# Patient Record
Sex: FEMALE | Race: BLACK OR AFRICAN AMERICAN | Employment: FULL TIME | URBAN - METROPOLITAN AREA
[De-identification: names, ages, dates, MRNs, and addresses within clinical notes are randomized per-mention and may not be internally consistent; named-entity substitution may affect disease eponyms.]

---

## 2018-10-21 ENCOUNTER — APPOINTMENT (OUTPATIENT)
Dept: CT IMAGING | Age: 48
DRG: 394 | End: 2018-10-21
Payer: COMMERCIAL

## 2018-10-21 ENCOUNTER — HOSPITAL ENCOUNTER (INPATIENT)
Age: 48
LOS: 4 days | Discharge: HOME | DRG: 394 | End: 2018-10-25
Attending: EMERGENCY MEDICINE | Admitting: INTERNAL MEDICINE
Payer: COMMERCIAL

## 2018-10-21 DIAGNOSIS — R10.30 LOWER ABDOMINAL PAIN: ICD-10-CM

## 2018-10-21 DIAGNOSIS — D64.9 ACUTE ON CHRONIC ANEMIA: ICD-10-CM

## 2018-10-21 DIAGNOSIS — K52.9 COLITIS: Primary | ICD-10-CM

## 2018-10-21 DIAGNOSIS — K92.2 LOWER GI BLEED: ICD-10-CM

## 2018-10-21 DIAGNOSIS — K92.2 LOWER GI BLEEDING: ICD-10-CM

## 2018-10-21 LAB
ABO/RH: NORMAL
ALBUMIN SERPL-MCNC: 4.2 G/DL (ref 3.4–5)
ALP BLD-CCNC: 74 U/L (ref 40–129)
ALT SERPL-CCNC: 9 U/L (ref 10–40)
ANION GAP SERPL CALCULATED.3IONS-SCNC: 12 MMOL/L (ref 3–16)
ANION GAP SERPL CALCULATED.3IONS-SCNC: 13 MMOL/L (ref 3–16)
ANTIBODY SCREEN: NORMAL
AST SERPL-CCNC: 19 U/L (ref 15–37)
BASOPHILS ABSOLUTE: 0 K/UL (ref 0–0.2)
BASOPHILS RELATIVE PERCENT: 0.5 %
BILIRUB SERPL-MCNC: 0.3 MG/DL (ref 0–1)
BILIRUBIN DIRECT: <0.2 MG/DL (ref 0–0.3)
BILIRUBIN, INDIRECT: ABNORMAL MG/DL (ref 0–1)
BUN BLDV-MCNC: 5 MG/DL (ref 7–20)
BUN BLDV-MCNC: 5 MG/DL (ref 7–20)
CALCIUM SERPL-MCNC: 8.7 MG/DL (ref 8.3–10.6)
CALCIUM SERPL-MCNC: 9.3 MG/DL (ref 8.3–10.6)
CHLORIDE BLD-SCNC: 103 MMOL/L (ref 99–110)
CHLORIDE BLD-SCNC: 110 MMOL/L (ref 99–110)
CO2: 19 MMOL/L (ref 21–32)
CO2: 24 MMOL/L (ref 21–32)
CREAT SERPL-MCNC: 0.6 MG/DL (ref 0.6–1.1)
CREAT SERPL-MCNC: 0.6 MG/DL (ref 0.6–1.1)
EOSINOPHILS ABSOLUTE: 0 K/UL (ref 0–0.6)
EOSINOPHILS RELATIVE PERCENT: 0.4 %
GFR AFRICAN AMERICAN: >60
GFR AFRICAN AMERICAN: >60
GFR NON-AFRICAN AMERICAN: >60
GFR NON-AFRICAN AMERICAN: >60
GLUCOSE BLD-MCNC: 114 MG/DL (ref 70–99)
GLUCOSE BLD-MCNC: 88 MG/DL (ref 70–99)
HCG QUALITATIVE: NEGATIVE
HCT VFR BLD CALC: 22.7 % (ref 36–48)
HCT VFR BLD CALC: 25.4 % (ref 36–48)
HEMOGLOBIN: 6.7 G/DL (ref 12–16)
HEMOGLOBIN: 7.7 G/DL (ref 12–16)
IMMATURE RETIC FRACT: 0.49 (ref 0.21–0.37)
INR BLD: 1.08 (ref 0.86–1.14)
LACTIC ACID: 0.6 MMOL/L (ref 0.4–2)
LIPASE: 19 U/L (ref 13–60)
LYMPHOCYTES ABSOLUTE: 3.4 K/UL (ref 1–5.1)
LYMPHOCYTES RELATIVE PERCENT: 50.9 %
MCH RBC QN AUTO: 18.3 PG (ref 26–34)
MCH RBC QN AUTO: 18.7 PG (ref 26–34)
MCHC RBC AUTO-ENTMCNC: 29.5 G/DL (ref 31–36)
MCHC RBC AUTO-ENTMCNC: 30.3 G/DL (ref 31–36)
MCV RBC AUTO: 61.7 FL (ref 80–100)
MCV RBC AUTO: 62.1 FL (ref 80–100)
MONOCYTES ABSOLUTE: 0.4 K/UL (ref 0–1.3)
MONOCYTES RELATIVE PERCENT: 5.8 %
NEUTROPHILS ABSOLUTE: 2.8 K/UL (ref 1.7–7.7)
NEUTROPHILS RELATIVE PERCENT: 42.4 %
PDW BLD-RTO: 21.8 % (ref 12.4–15.4)
PDW BLD-RTO: 21.8 % (ref 12.4–15.4)
PLATELET # BLD: 265 K/UL (ref 135–450)
PLATELET # BLD: 308 K/UL (ref 135–450)
PMV BLD AUTO: 8.6 FL (ref 5–10.5)
PMV BLD AUTO: 8.6 FL (ref 5–10.5)
POTASSIUM REFLEX MAGNESIUM: 3.7 MMOL/L (ref 3.5–5.1)
POTASSIUM SERPL-SCNC: 4.1 MMOL/L (ref 3.5–5.1)
PROTHROMBIN TIME: 12.3 SEC (ref 9.8–13)
RBC # BLD: 3.66 M/UL (ref 4–5.2)
RBC # BLD: 4.13 M/UL (ref 4–5.2)
RETICULOCYTE ABSOLUTE COUNT: 0.05 M/UL (ref 0.02–0.1)
RETICULOCYTE COUNT PCT: 1.34 % (ref 0.5–2.18)
SEDIMENTATION RATE, ERYTHROCYTE: 17 MM/HR (ref 0–20)
SODIUM BLD-SCNC: 139 MMOL/L (ref 136–145)
SODIUM BLD-SCNC: 142 MMOL/L (ref 136–145)
TOTAL PROTEIN: 7.8 G/DL (ref 6.4–8.2)
WBC # BLD: 6.7 K/UL (ref 4–11)
WBC # BLD: 8.5 K/UL (ref 4–11)

## 2018-10-21 PROCEDURE — 6360000004 HC RX CONTRAST MEDICATION: Performed by: EMERGENCY MEDICINE

## 2018-10-21 PROCEDURE — 80048 BASIC METABOLIC PNL TOTAL CA: CPT

## 2018-10-21 PROCEDURE — 6360000002 HC RX W HCPCS: Performed by: STUDENT IN AN ORGANIZED HEALTH CARE EDUCATION/TRAINING PROGRAM

## 2018-10-21 PROCEDURE — 86900 BLOOD TYPING SEROLOGIC ABO: CPT

## 2018-10-21 PROCEDURE — 85045 AUTOMATED RETICULOCYTE COUNT: CPT

## 2018-10-21 PROCEDURE — 6360000002 HC RX W HCPCS: Performed by: EMERGENCY MEDICINE

## 2018-10-21 PROCEDURE — 85610 PROTHROMBIN TIME: CPT

## 2018-10-21 PROCEDURE — 86850 RBC ANTIBODY SCREEN: CPT

## 2018-10-21 PROCEDURE — 2580000003 HC RX 258: Performed by: STUDENT IN AN ORGANIZED HEALTH CARE EDUCATION/TRAINING PROGRAM

## 2018-10-21 PROCEDURE — 85652 RBC SED RATE AUTOMATED: CPT

## 2018-10-21 PROCEDURE — 96374 THER/PROPH/DIAG INJ IV PUSH: CPT

## 2018-10-21 PROCEDURE — 86901 BLOOD TYPING SEROLOGIC RH(D): CPT

## 2018-10-21 PROCEDURE — 80076 HEPATIC FUNCTION PANEL: CPT

## 2018-10-21 PROCEDURE — 85025 COMPLETE CBC W/AUTO DIFF WBC: CPT

## 2018-10-21 PROCEDURE — 74177 CT ABD & PELVIS W/CONTRAST: CPT

## 2018-10-21 PROCEDURE — 85027 COMPLETE CBC AUTOMATED: CPT

## 2018-10-21 PROCEDURE — 2500000003 HC RX 250 WO HCPCS: Performed by: EMERGENCY MEDICINE

## 2018-10-21 PROCEDURE — 1200000000 HC SEMI PRIVATE

## 2018-10-21 PROCEDURE — 83540 ASSAY OF IRON: CPT

## 2018-10-21 PROCEDURE — 86140 C-REACTIVE PROTEIN: CPT

## 2018-10-21 PROCEDURE — 84238 ASSAY NONENDOCRINE RECEPTOR: CPT

## 2018-10-21 PROCEDURE — 6360000002 HC RX W HCPCS

## 2018-10-21 PROCEDURE — 2580000003 HC RX 258: Performed by: EMERGENCY MEDICINE

## 2018-10-21 PROCEDURE — 96375 TX/PRO/DX INJ NEW DRUG ADDON: CPT

## 2018-10-21 PROCEDURE — G0378 HOSPITAL OBSERVATION PER HR: HCPCS

## 2018-10-21 PROCEDURE — 99291 CRITICAL CARE FIRST HOUR: CPT

## 2018-10-21 PROCEDURE — C9113 INJ PANTOPRAZOLE SODIUM, VIA: HCPCS | Performed by: EMERGENCY MEDICINE

## 2018-10-21 PROCEDURE — 36415 COLL VENOUS BLD VENIPUNCTURE: CPT

## 2018-10-21 PROCEDURE — 84703 CHORIONIC GONADOTROPIN ASSAY: CPT

## 2018-10-21 PROCEDURE — 83605 ASSAY OF LACTIC ACID: CPT

## 2018-10-21 PROCEDURE — 83690 ASSAY OF LIPASE: CPT

## 2018-10-21 PROCEDURE — 83550 IRON BINDING TEST: CPT

## 2018-10-21 RX ORDER — ONDANSETRON 2 MG/ML
4 INJECTION INTRAMUSCULAR; INTRAVENOUS ONCE
Status: COMPLETED | OUTPATIENT
Start: 2018-10-21 | End: 2018-10-21

## 2018-10-21 RX ORDER — SODIUM CHLORIDE 0.9 % (FLUSH) 0.9 %
10 SYRINGE (ML) INJECTION EVERY 12 HOURS SCHEDULED
Status: DISCONTINUED | OUTPATIENT
Start: 2018-10-21 | End: 2018-10-25 | Stop reason: HOSPADM

## 2018-10-21 RX ORDER — PANTOPRAZOLE SODIUM 40 MG/10ML
40 INJECTION, POWDER, LYOPHILIZED, FOR SOLUTION INTRAVENOUS 2 TIMES DAILY
Status: DISCONTINUED | OUTPATIENT
Start: 2018-10-22 | End: 2018-10-23

## 2018-10-21 RX ORDER — PANTOPRAZOLE SODIUM 40 MG/10ML
80 INJECTION, POWDER, LYOPHILIZED, FOR SOLUTION INTRAVENOUS ONCE
Status: COMPLETED | OUTPATIENT
Start: 2018-10-21 | End: 2018-10-21

## 2018-10-21 RX ORDER — SODIUM CHLORIDE 0.9 % (FLUSH) 0.9 %
10 SYRINGE (ML) INJECTION PRN
Status: DISCONTINUED | OUTPATIENT
Start: 2018-10-21 | End: 2018-10-25 | Stop reason: HOSPADM

## 2018-10-21 RX ORDER — CIPROFLOXACIN 2 MG/ML
400 INJECTION, SOLUTION INTRAVENOUS EVERY 12 HOURS
Status: DISCONTINUED | OUTPATIENT
Start: 2018-10-21 | End: 2018-10-24

## 2018-10-21 RX ORDER — 0.9 % SODIUM CHLORIDE 0.9 %
1000 INTRAVENOUS SOLUTION INTRAVENOUS ONCE
Status: COMPLETED | OUTPATIENT
Start: 2018-10-21 | End: 2018-10-21

## 2018-10-21 RX ORDER — ONDANSETRON 2 MG/ML
4 INJECTION INTRAMUSCULAR; INTRAVENOUS EVERY 6 HOURS PRN
Status: DISCONTINUED | OUTPATIENT
Start: 2018-10-21 | End: 2018-10-25 | Stop reason: HOSPADM

## 2018-10-21 RX ORDER — CIPROFLOXACIN 2 MG/ML
400 INJECTION, SOLUTION INTRAVENOUS ONCE
Status: DISCONTINUED | OUTPATIENT
Start: 2018-10-21 | End: 2018-10-21

## 2018-10-21 RX ORDER — 0.9 % SODIUM CHLORIDE 0.9 %
250 INTRAVENOUS SOLUTION INTRAVENOUS ONCE
Status: DISCONTINUED | OUTPATIENT
Start: 2018-10-21 | End: 2018-10-22

## 2018-10-21 RX ADMIN — ONDANSETRON 4 MG: 2 INJECTION INTRAMUSCULAR; INTRAVENOUS at 14:50

## 2018-10-21 RX ADMIN — HYDROMORPHONE HYDROCHLORIDE 0.5 MG: 1 INJECTION, SOLUTION INTRAMUSCULAR; INTRAVENOUS; SUBCUTANEOUS at 14:49

## 2018-10-21 RX ADMIN — HYDROMORPHONE HYDROCHLORIDE 0.5 MG: 1 INJECTION, SOLUTION INTRAMUSCULAR; INTRAVENOUS; SUBCUTANEOUS at 18:31

## 2018-10-21 RX ADMIN — PANTOPRAZOLE SODIUM 80 MG: 40 INJECTION, POWDER, FOR SOLUTION INTRAVENOUS at 15:28

## 2018-10-21 RX ADMIN — SODIUM CHLORIDE 1000 ML: 9 INJECTION, SOLUTION INTRAVENOUS at 19:00

## 2018-10-21 RX ADMIN — CIPROFLOXACIN 400 MG: 2 INJECTION, SOLUTION INTRAVENOUS at 22:21

## 2018-10-21 RX ADMIN — IOPAMIDOL 80 ML: 755 INJECTION, SOLUTION INTRAVENOUS at 16:19

## 2018-10-21 RX ADMIN — METRONIDAZOLE 500 MG: 500 INJECTION, SOLUTION INTRAVENOUS at 18:27

## 2018-10-21 RX ADMIN — SODIUM CHLORIDE 1000 ML: 9 INJECTION, SOLUTION INTRAVENOUS at 14:51

## 2018-10-21 RX ADMIN — Medication 10 ML: at 22:25

## 2018-10-21 ASSESSMENT — PAIN SCALES - GENERAL
PAINLEVEL_OUTOF10: 7
PAINLEVEL_OUTOF10: 7
PAINLEVEL_OUTOF10: 6

## 2018-10-21 ASSESSMENT — ENCOUNTER SYMPTOMS
ABDOMINAL PAIN: 1
BLOOD IN STOOL: 1
NAUSEA: 1
VOMITING: 0
SHORTNESS OF BREATH: 0
DIARRHEA: 1

## 2018-10-21 ASSESSMENT — PAIN DESCRIPTION - LOCATION: LOCATION: ABDOMEN

## 2018-10-21 ASSESSMENT — PAIN DESCRIPTION - FREQUENCY: FREQUENCY: CONTINUOUS

## 2018-10-21 ASSESSMENT — PAIN DESCRIPTION - DESCRIPTORS: DESCRIPTORS: CRAMPING;CONSTANT

## 2018-10-21 ASSESSMENT — PAIN DESCRIPTION - PAIN TYPE: TYPE: ACUTE PAIN

## 2018-10-21 NOTE — ED PROVIDER NOTES
CRITICAL CARE NOTE  There was a high probability of clinical significant / life threatening deterioration in the patient's condition which required my urgent intervention. Patient has worsening anemia with abdominal pain and bloody diarrhea. She required IV narcotic pain medication and IV antibiotics for probable infectious colitis. Total critical care time was 31 minutes excluding any separately reported procedures. TRIAGE CHIEF COMPLAINT:   Chief Complaint   Patient presents with    Abdominal Pain    Diarrhea       HPI: Zara Brown is a 50 y.o. female who presents to the emergency department with complaint of bilateral mid and lower crampy abdominal pain that started yesterday but became more severe at 3:00 in the morning with loose stools and more severe pain. Stools have not been watery and she has had bright red rectal bleeding at least 12 times in the last 12 hours. Denies melena. She states the lower abdominal pain feels like menstrual cramps but she has not had a period in 2 months. Her periods have been irregular for the past year coming every 14 days and occasionally lasting for up to 9 days. She states the pain is 7/10. She thinks she may have a history of diverticulosis. Patient does not take any blood thinners. She has no UTI symptoms but states when she urinates sometimes it helps the pressure sensation. Denies flank pain. She has no fever or chills. Denies chest pain or shortness of breath. She has had previous C-sections but no other abdominal surgery. She has a history of hypertension, Graves' disease, hyperlipidemia and tachycardia. Denies history of atrial fibrillation. Patient has a history of anemia. In December 2014 hemoglobin was 14, in January 2016 hemoglobin was 10.5, in February 2017 hemoglobin was 9.0 and in September 2017 hemoglobin was 8.5. REVIEW OF SYSTEMS:   10 systems reviewed. Pertinent positives per HPI. Otherwise noted to be negative.   I have reviewed the triage/nursing documentation and agree unless otherwise noted below. PAST MEDICAL HISTORY:   Past Medical History:   Diagnosis Date    Anxiety     Depression     Graves disease     Graves disease     Hyperlipidemia     Hypertension     Rapid heart beat     Thyroid disease         CURRENT MEDICATIONS:   Patient's Medications   New Prescriptions    No medications on file   Previous Medications    ATENOLOL (TENORMIN) 50 MG TABLET    Take 50 mg by mouth daily. DICYCLOMINE (BENTYL) 10 MG CAPSULE    Take 1 capsule by mouth 4 times daily (before meals and nightly)    HYDROXYZINE (VISTARIL) 25 MG CAPSULE    Take 1 capsule by mouth 3 times daily as needed for Itching    IBUPROFEN (ADVIL;MOTRIN) 600 MG TABLET    Take 1 tablet by mouth every 6 hours as needed for Pain    ONDANSETRON (ZOFRAN) 4 MG TABLET    Take 1 tablet by mouth every 8 hours as needed for Nausea or Vomiting   Modified Medications    No medications on file   Discontinued Medications    ALUM & MAG HYDROXIDE-SIMETH (ALUMINUM-MAGNESIUM-SIMETHICONE) 200-200-20 MG/5ML SUSP    Take 5 mLs by mouth every 6 hours as needed (gas, indigestion)    NAPROXEN (NAPROSYN) 500 MG TABLET    Take 1 tablet by mouth 2 times daily for 15 doses    PSEUDOEPHEDRINE (SUDAFED) 60 MG TABLET    Take 1 tablet by mouth every 6 hours as needed for Congestion        SURGICAL HISTORY:       Procedure Laterality Date     SECTION          FAMILY HISTORY:   History reviewed. No pertinent family history. SOCIAL HISTORY:   Social History     Social History    Marital status: Single     Spouse name: N/A    Number of children: N/A    Years of education: N/A     Occupational History    Not on file.      Social History Main Topics    Smoking status: Current Every Day Smoker     Packs/day: 0.50    Smokeless tobacco: Never Used    Alcohol use No    Drug use: No    Sexual activity: Not on file     Other Topics Concern    Not on file     Social History Narrative    No narrative on file        ALLERGIES:   Allergies   Allergen Reactions    Codeine Hives and Other (See Comments)     \"freak out\"    Nsaids Other (See Comments)     Counteracts with Atenolol    Diazepam Anxiety       PHYSICAL EXAM:  VITAL SIGNS: BP (!) 103/59   Pulse 72   Temp 98.4 °F (36.9 °C)   Resp 20   Ht 5' 4\" (1.626 m)   Wt 99.3 kg (219 lb)   LMP 08/21/2018   SpO2 100%   BMI 37.59 kg/m²   Constitutional:  No acute distress, Non-toxic appearance. Appears uncomfortable. Not pale or diaphoretic. No respiratory distress. HENT: Normocephalic, Atraumatic Oropharynx moist, No oral exudates. TMs are normal.  Eyes:  PERRLA, EOMI, Conjunctiva normal, No discharge. Neck: No tenderness, Supple, No lymphadenopathy, No stridor. Cardiovascular:  Normal heart rate, Normal rhythm, No murmurs, No rubs, No gallops. Pulmonary/Chest:  Normal breath sounds, No respiratory distress, No wheezing,  Abdomen:   Soft, patient has some tenderness diffusely in the lower abdomen bilaterally. No mass or rebound. No masses, No pulsatile masses  Back:  No tenderness, No CVA tenderness  Extremities:  Normal range of motion, Intact distal pulses, No edema, No tenderness  Neurologic:  Alert & oriented x 3, Normal motor function, Normal sensory function, No focal deficits  Skin:  Warm, Dry, No erythema, No rash  Psychiatric:  Affect normal, Mood normal      EKG:    EKG interpreted by myself. Radiology:  CT ABDOMEN PELVIS W IV CONTRAST Additional Contrast? None   Final Result      1. Very mild wall thickening involving the proximal sigmoid colon may represent infectious or inflammatory colitis. Recommend follow-up colonoscopy. 2. Uterine fibroids. 3. Left adrenal adenoma.              LAB  Labs Reviewed   CBC - Abnormal; Notable for the following:        Result Value    Hemoglobin 7.7 (*)     Hematocrit 25.4 (*)     MCV 61.7 (*)     MCH 18.7 (*)     MCHC 30.3 (*)     RDW 21.8 (*)     All other components within normal limits    Narrative:     Performed at:  [facility]  [address]   Phone [phone]   BASIC METABOLIC PANEL - Abnormal; Notable for the following:     Glucose 114 (*)     BUN 5 (*)     All other components within normal limits    Narrative:     Performed at:  [facility]  [address]   Phone [phone]   HEPATIC FUNCTION PANEL - Abnormal; Notable for the following:     ALT 9 (*)     All other components within normal limits    Narrative:     Performed at:  [facility]  [address]   Phone [phone]   LIPASE    Narrative:     Performed at:  [facility]  [address]   Phone [phone]    Narrative:     Performed at:  [facility]  [address]   Phone [phone]   HCG, SERUM, QUALITATIVE    Narrative:     Performed at:  [facility]  [address]   Phone [phone]   URINALYSIS   LACTIC ACID, PLASMA       ED COURSE & MEDICAL DECISION MAKING:  Pertinent Labs & Imaging studies reviewed. (See chart for details)  59-year-old female with onset yesterday of crampy lower abdominal pain followed by recurrent episodes of loose bowels with bright red blood up to 12 times. Most recent hemoglobin in September of last year was 8.5. Current hemoglobin is 7.7 with MCV 62. The ABC was normal.  Platelet count was normal.  INR was normal. BMP and lipase were normal.  Liver function tests were normal. Lactic acid was normal  Patient was given IV fluids. She was medicated with Zofran and Dilaudid with improvement of the pain.   CT scan of the abdomen and pelvis with IV contrast

## 2018-10-21 NOTE — ED TRIAGE NOTES
Pt c/o abd pain since yesterday. Woke up  at 0300 and had diarrhea and abd pain. Has been using restrrom since, stool very loose but not liquid. Patient states that she has some relief after urinating, releases pressure. Son with patient. Pt grimaces in pain, abd distended, soft.

## 2018-10-21 NOTE — ED NOTES
Call placed to RN at [facility], RN aware that BP has been fluctuating. 2nd liter infusing. RN aware that I will send antibiotic with EMS. Patient remains alert and oriented, skin w/d, resp easy pain 3/10. NSR on monitor.      Abraham Li RN  10/21/18 0020

## 2018-10-21 NOTE — ED NOTES
Call placed to transfer center to get bed status. .  Pt has a room [id number]. Working on ETA, may be close to 1560. Patient kirill Lion RN  10/21/18 7349

## 2018-10-22 ENCOUNTER — ANESTHESIA EVENT (OUTPATIENT)
Dept: ENDOSCOPY | Age: 48
DRG: 394 | End: 2018-10-22
Payer: COMMERCIAL

## 2018-10-22 ENCOUNTER — APPOINTMENT (OUTPATIENT)
Dept: CT IMAGING | Age: 48
DRG: 394 | End: 2018-10-22
Payer: COMMERCIAL

## 2018-10-22 ENCOUNTER — APPOINTMENT (OUTPATIENT)
Dept: GENERAL RADIOLOGY | Age: 48
DRG: 394 | End: 2018-10-22
Payer: COMMERCIAL

## 2018-10-22 ENCOUNTER — ANESTHESIA (OUTPATIENT)
Dept: ENDOSCOPY | Age: 48
DRG: 394 | End: 2018-10-22
Payer: COMMERCIAL

## 2018-10-22 VITALS — DIASTOLIC BLOOD PRESSURE: 42 MMHG | OXYGEN SATURATION: 100 % | SYSTOLIC BLOOD PRESSURE: 85 MMHG

## 2018-10-22 LAB
ALBUMIN SERPL-MCNC: 3.9 G/DL (ref 3.4–5)
ANION GAP SERPL CALCULATED.3IONS-SCNC: 11 MMOL/L (ref 3–16)
BASOPHILS ABSOLUTE: 0 K/UL (ref 0–0.2)
BASOPHILS RELATIVE PERCENT: 0.6 %
BUN BLDV-MCNC: 5 MG/DL (ref 7–20)
C-REACTIVE PROTEIN: 1.3 MG/L (ref 0–5.1)
CALCIUM SERPL-MCNC: 8.2 MG/DL (ref 8.3–10.6)
CHLORIDE BLD-SCNC: 107 MMOL/L (ref 99–110)
CO2: 22 MMOL/L (ref 21–32)
CREAT SERPL-MCNC: 0.5 MG/DL (ref 0.6–1.1)
EOSINOPHILS ABSOLUTE: 0.1 K/UL (ref 0–0.6)
EOSINOPHILS RELATIVE PERCENT: 1 %
GFR AFRICAN AMERICAN: >60
GFR NON-AFRICAN AMERICAN: >60
GLUCOSE BLD-MCNC: 93 MG/DL (ref 70–99)
HCT VFR BLD CALC: 27 % (ref 36–48)
HCT VFR BLD CALC: 27.5 % (ref 36–48)
HCT VFR BLD CALC: 27.9 % (ref 36–48)
HEMOGLOBIN: 8.5 G/DL (ref 12–16)
HEMOGLOBIN: 8.6 G/DL (ref 12–16)
HEMOGLOBIN: 8.8 G/DL (ref 12–16)
IRON SATURATION: 4 % (ref 15–50)
IRON: 16 UG/DL (ref 37–145)
LYMPHOCYTES ABSOLUTE: 1.8 K/UL (ref 1–5.1)
LYMPHOCYTES RELATIVE PERCENT: 34.4 %
MCH RBC QN AUTO: 21.4 PG (ref 26–34)
MCHC RBC AUTO-ENTMCNC: 31.2 G/DL (ref 31–36)
MCV RBC AUTO: 68.4 FL (ref 80–100)
MONOCYTES ABSOLUTE: 0.4 K/UL (ref 0–1.3)
MONOCYTES RELATIVE PERCENT: 7.2 %
NEUTROPHILS ABSOLUTE: 3 K/UL (ref 1.7–7.7)
NEUTROPHILS RELATIVE PERCENT: 56.8 %
PDW BLD-RTO: 25 % (ref 12.4–15.4)
PHOSPHORUS: 2.7 MG/DL (ref 2.5–4.9)
PLATELET # BLD: 219 K/UL (ref 135–450)
PMV BLD AUTO: 8.8 FL (ref 5–10.5)
POTASSIUM SERPL-SCNC: 3.7 MMOL/L (ref 3.5–5.1)
RBC # BLD: 4.02 M/UL (ref 4–5.2)
SODIUM BLD-SCNC: 140 MMOL/L (ref 136–145)
T4 FREE: 0.8 NG/DL (ref 0.9–1.8)
TOTAL IRON BINDING CAPACITY: 375 UG/DL (ref 260–445)
TSH REFLEX: 4.27 UIU/ML (ref 0.27–4.2)
WBC # BLD: 5.3 K/UL (ref 4–11)

## 2018-10-22 PROCEDURE — 1200000000 HC SEMI PRIVATE

## 2018-10-22 PROCEDURE — 84443 ASSAY THYROID STIM HORMONE: CPT

## 2018-10-22 PROCEDURE — 94760 N-INVAS EAR/PLS OXIMETRY 1: CPT

## 2018-10-22 PROCEDURE — 84439 ASSAY OF FREE THYROXINE: CPT

## 2018-10-22 PROCEDURE — 2580000003 HC RX 258: Performed by: INTERNAL MEDICINE

## 2018-10-22 PROCEDURE — 85014 HEMATOCRIT: CPT

## 2018-10-22 PROCEDURE — 2580000003 HC RX 258: Performed by: STUDENT IN AN ORGANIZED HEALTH CARE EDUCATION/TRAINING PROGRAM

## 2018-10-22 PROCEDURE — 86923 COMPATIBILITY TEST ELECTRIC: CPT

## 2018-10-22 PROCEDURE — 0DBG8ZX EXCISION OF LEFT LARGE INTESTINE, VIA NATURAL OR ARTIFICIAL OPENING ENDOSCOPIC, DIAGNOSTIC: ICD-10-PCS | Performed by: INTERNAL MEDICINE

## 2018-10-22 PROCEDURE — 3700000001 HC ADD 15 MINUTES (ANESTHESIA): Performed by: INTERNAL MEDICINE

## 2018-10-22 PROCEDURE — 36556 INSERT NON-TUNNEL CV CATH: CPT

## 2018-10-22 PROCEDURE — C9113 INJ PANTOPRAZOLE SODIUM, VIA: HCPCS | Performed by: STUDENT IN AN ORGANIZED HEALTH CARE EDUCATION/TRAINING PROGRAM

## 2018-10-22 PROCEDURE — 71045 X-RAY EXAM CHEST 1 VIEW: CPT

## 2018-10-22 PROCEDURE — 2500000003 HC RX 250 WO HCPCS: Performed by: STUDENT IN AN ORGANIZED HEALTH CARE EDUCATION/TRAINING PROGRAM

## 2018-10-22 PROCEDURE — 36430 TRANSFUSION BLD/BLD COMPNT: CPT

## 2018-10-22 PROCEDURE — 88305 TISSUE EXAM BY PATHOLOGIST: CPT

## 2018-10-22 PROCEDURE — 36415 COLL VENOUS BLD VENIPUNCTURE: CPT

## 2018-10-22 PROCEDURE — 2709999900 HC NON-CHARGEABLE SUPPLY: Performed by: INTERNAL MEDICINE

## 2018-10-22 PROCEDURE — 3609017100 HC EGD: Performed by: INTERNAL MEDICINE

## 2018-10-22 PROCEDURE — 6360000002 HC RX W HCPCS: Performed by: INTERNAL MEDICINE

## 2018-10-22 PROCEDURE — 3609010200 HC COLONOSCOPY ABLATION TUMOR POLYP/OTHER LES: Performed by: INTERNAL MEDICINE

## 2018-10-22 PROCEDURE — 3700000000 HC ANESTHESIA ATTENDED CARE: Performed by: INTERNAL MEDICINE

## 2018-10-22 PROCEDURE — 6360000002 HC RX W HCPCS: Performed by: STUDENT IN AN ORGANIZED HEALTH CARE EDUCATION/TRAINING PROGRAM

## 2018-10-22 PROCEDURE — 0DJ08ZZ INSPECTION OF UPPER INTESTINAL TRACT, VIA NATURAL OR ARTIFICIAL OPENING ENDOSCOPIC: ICD-10-PCS | Performed by: INTERNAL MEDICINE

## 2018-10-22 PROCEDURE — 6370000000 HC RX 637 (ALT 250 FOR IP): Performed by: INTERNAL MEDICINE

## 2018-10-22 PROCEDURE — G0378 HOSPITAL OBSERVATION PER HR: HCPCS

## 2018-10-22 PROCEDURE — 6370000000 HC RX 637 (ALT 250 FOR IP): Performed by: STUDENT IN AN ORGANIZED HEALTH CARE EDUCATION/TRAINING PROGRAM

## 2018-10-22 PROCEDURE — 2580000003 HC RX 258: Performed by: NURSE ANESTHETIST, CERTIFIED REGISTERED

## 2018-10-22 PROCEDURE — 3609010300 HC COLONOSCOPY W/BIOPSY SINGLE/MULTIPLE: Performed by: INTERNAL MEDICINE

## 2018-10-22 PROCEDURE — P9016 RBC LEUKOCYTES REDUCED: HCPCS

## 2018-10-22 PROCEDURE — 6360000002 HC RX W HCPCS: Performed by: NURSE ANESTHETIST, CERTIFIED REGISTERED

## 2018-10-22 PROCEDURE — 85025 COMPLETE CBC W/AUTO DIFF WBC: CPT

## 2018-10-22 PROCEDURE — 80069 RENAL FUNCTION PANEL: CPT

## 2018-10-22 PROCEDURE — 85018 HEMOGLOBIN: CPT

## 2018-10-22 PROCEDURE — 70490 CT SOFT TISSUE NECK W/O DYE: CPT

## 2018-10-22 PROCEDURE — 02HV33Z INSERTION OF INFUSION DEVICE INTO SUPERIOR VENA CAVA, PERCUTANEOUS APPROACH: ICD-10-PCS | Performed by: INTERNAL MEDICINE

## 2018-10-22 RX ORDER — LORAZEPAM 2 MG/ML
1 INJECTION INTRAMUSCULAR ONCE
Status: DISCONTINUED | OUTPATIENT
Start: 2018-10-22 | End: 2018-10-22

## 2018-10-22 RX ORDER — SODIUM CHLORIDE 9 MG/ML
INJECTION, SOLUTION INTRAVENOUS CONTINUOUS
Status: DISCONTINUED | OUTPATIENT
Start: 2018-10-22 | End: 2018-10-24

## 2018-10-22 RX ORDER — ACETAMINOPHEN 325 MG/1
650 TABLET ORAL EVERY 6 HOURS PRN
Status: DISCONTINUED | OUTPATIENT
Start: 2018-10-22 | End: 2018-10-24

## 2018-10-22 RX ORDER — OXYCODONE HYDROCHLORIDE 5 MG/1
5 TABLET ORAL PRN
Status: DISCONTINUED | OUTPATIENT
Start: 2018-10-22 | End: 2018-10-22 | Stop reason: HOSPADM

## 2018-10-22 RX ORDER — PROPOFOL 10 MG/ML
INJECTION, EMULSION INTRAVENOUS
Status: COMPLETED
Start: 2018-10-22 | End: 2018-10-22

## 2018-10-22 RX ORDER — PROMETHAZINE HYDROCHLORIDE 25 MG/ML
6.25 INJECTION, SOLUTION INTRAMUSCULAR; INTRAVENOUS
Status: DISCONTINUED | OUTPATIENT
Start: 2018-10-22 | End: 2018-10-22 | Stop reason: HOSPADM

## 2018-10-22 RX ORDER — HYDROCODONE BITARTRATE AND ACETAMINOPHEN 5; 325 MG/1; MG/1
1 TABLET ORAL EVERY 4 HOURS PRN
Status: DISCONTINUED | OUTPATIENT
Start: 2018-10-22 | End: 2018-10-24

## 2018-10-22 RX ORDER — MEPERIDINE HYDROCHLORIDE 25 MG/ML
12.5 INJECTION INTRAMUSCULAR; INTRAVENOUS; SUBCUTANEOUS EVERY 5 MIN PRN
Status: DISCONTINUED | OUTPATIENT
Start: 2018-10-22 | End: 2018-10-22 | Stop reason: HOSPADM

## 2018-10-22 RX ORDER — SODIUM CHLORIDE, SODIUM LACTATE, POTASSIUM CHLORIDE, CALCIUM CHLORIDE 600; 310; 30; 20 MG/100ML; MG/100ML; MG/100ML; MG/100ML
INJECTION, SOLUTION INTRAVENOUS CONTINUOUS PRN
Status: DISCONTINUED | OUTPATIENT
Start: 2018-10-22 | End: 2018-10-22 | Stop reason: SDUPTHER

## 2018-10-22 RX ORDER — LABETALOL HYDROCHLORIDE 5 MG/ML
5 INJECTION, SOLUTION INTRAVENOUS EVERY 10 MIN PRN
Status: DISCONTINUED | OUTPATIENT
Start: 2018-10-22 | End: 2018-10-22 | Stop reason: HOSPADM

## 2018-10-22 RX ORDER — DIPHENHYDRAMINE HYDROCHLORIDE 50 MG/ML
12.5 INJECTION INTRAMUSCULAR; INTRAVENOUS
Status: DISCONTINUED | OUTPATIENT
Start: 2018-10-22 | End: 2018-10-22 | Stop reason: HOSPADM

## 2018-10-22 RX ORDER — ACETAMINOPHEN 500 MG
1000 TABLET ORAL ONCE
Status: COMPLETED | OUTPATIENT
Start: 2018-10-22 | End: 2018-10-22

## 2018-10-22 RX ORDER — OXYCODONE HYDROCHLORIDE 5 MG/1
10 TABLET ORAL PRN
Status: DISCONTINUED | OUTPATIENT
Start: 2018-10-22 | End: 2018-10-22 | Stop reason: HOSPADM

## 2018-10-22 RX ORDER — LORAZEPAM 1 MG/1
1 TABLET ORAL EVERY 4 HOURS PRN
Status: DISCONTINUED | OUTPATIENT
Start: 2018-10-22 | End: 2018-10-22

## 2018-10-22 RX ORDER — 0.9 % SODIUM CHLORIDE 0.9 %
1000 INTRAVENOUS SOLUTION INTRAVENOUS ONCE
Status: COMPLETED | OUTPATIENT
Start: 2018-10-22 | End: 2018-10-22

## 2018-10-22 RX ORDER — HYDRALAZINE HYDROCHLORIDE 20 MG/ML
5 INJECTION INTRAMUSCULAR; INTRAVENOUS EVERY 10 MIN PRN
Status: DISCONTINUED | OUTPATIENT
Start: 2018-10-22 | End: 2018-10-22 | Stop reason: HOSPADM

## 2018-10-22 RX ORDER — SODIUM CHLORIDE 9 MG/ML
INJECTION, SOLUTION INTRAVENOUS
Status: DISPENSED
Start: 2018-10-22 | End: 2018-10-23

## 2018-10-22 RX ORDER — MORPHINE SULFATE 4 MG/ML
1 INJECTION, SOLUTION INTRAMUSCULAR; INTRAVENOUS EVERY 5 MIN PRN
Status: DISCONTINUED | OUTPATIENT
Start: 2018-10-22 | End: 2018-10-22 | Stop reason: HOSPADM

## 2018-10-22 RX ORDER — 0.9 % SODIUM CHLORIDE 0.9 %
250 INTRAVENOUS SOLUTION INTRAVENOUS ONCE
Status: COMPLETED | OUTPATIENT
Start: 2018-10-22 | End: 2018-10-22

## 2018-10-22 RX ORDER — PROPOFOL 10 MG/ML
INJECTION, EMULSION INTRAVENOUS PRN
Status: DISCONTINUED | OUTPATIENT
Start: 2018-10-22 | End: 2018-10-22 | Stop reason: SDUPTHER

## 2018-10-22 RX ORDER — LORAZEPAM 1 MG/1
1 TABLET ORAL ONCE
Status: COMPLETED | OUTPATIENT
Start: 2018-10-22 | End: 2018-10-22

## 2018-10-22 RX ORDER — SODIUM CHLORIDE 9 MG/ML
INJECTION, SOLUTION INTRAVENOUS CONTINUOUS
Status: DISCONTINUED | OUTPATIENT
Start: 2018-10-22 | End: 2018-10-22

## 2018-10-22 RX ORDER — METOCLOPRAMIDE HYDROCHLORIDE 5 MG/ML
10 INJECTION INTRAMUSCULAR; INTRAVENOUS
Status: DISCONTINUED | OUTPATIENT
Start: 2018-10-22 | End: 2018-10-22 | Stop reason: HOSPADM

## 2018-10-22 RX ADMIN — PROPOFOL 100 MG: 10 INJECTION, EMULSION INTRAVENOUS at 10:05

## 2018-10-22 RX ADMIN — PANTOPRAZOLE SODIUM 40 MG: 40 INJECTION, POWDER, FOR SOLUTION INTRAVENOUS at 20:54

## 2018-10-22 RX ADMIN — PROPOFOL 100 MG: 10 INJECTION, EMULSION INTRAVENOUS at 10:25

## 2018-10-22 RX ADMIN — METRONIDAZOLE 500 MG: 500 INJECTION, SOLUTION INTRAVENOUS at 18:10

## 2018-10-22 RX ADMIN — HYDROCODONE BITARTRATE AND ACETAMINOPHEN 1 TABLET: 5; 325 TABLET ORAL at 14:20

## 2018-10-22 RX ADMIN — METRONIDAZOLE 500 MG: 500 INJECTION, SOLUTION INTRAVENOUS at 03:39

## 2018-10-22 RX ADMIN — ONDANSETRON 4 MG: 2 INJECTION INTRAMUSCULAR; INTRAVENOUS at 09:51

## 2018-10-22 RX ADMIN — CIPROFLOXACIN 400 MG: 2 INJECTION, SOLUTION INTRAVENOUS at 09:52

## 2018-10-22 RX ADMIN — SODIUM CHLORIDE, SODIUM LACTATE, POTASSIUM CHLORIDE, AND CALCIUM CHLORIDE: 600; 310; 30; 20 INJECTION, SOLUTION INTRAVENOUS at 10:00

## 2018-10-22 RX ADMIN — CIPROFLOXACIN 400 MG: 2 INJECTION, SOLUTION INTRAVENOUS at 23:01

## 2018-10-22 RX ADMIN — IRON SUCROSE 200 MG: 20 INJECTION, SOLUTION INTRAVENOUS at 16:54

## 2018-10-22 RX ADMIN — ACETAMINOPHEN 650 MG: 325 TABLET ORAL at 13:33

## 2018-10-22 RX ADMIN — LORAZEPAM 1 MG: 1 TABLET ORAL at 01:35

## 2018-10-22 RX ADMIN — ONDANSETRON 4 MG: 2 INJECTION INTRAMUSCULAR; INTRAVENOUS at 20:54

## 2018-10-22 RX ADMIN — PANTOPRAZOLE SODIUM 40 MG: 40 INJECTION, POWDER, FOR SOLUTION INTRAVENOUS at 09:52

## 2018-10-22 RX ADMIN — METRONIDAZOLE 500 MG: 500 INJECTION, SOLUTION INTRAVENOUS at 10:00

## 2018-10-22 RX ADMIN — Medication 10 ML: at 20:54

## 2018-10-22 RX ADMIN — SODIUM CHLORIDE 250 ML: 900 INJECTION, SOLUTION INTRAVENOUS at 06:10

## 2018-10-22 RX ADMIN — SODIUM CHLORIDE 1000 ML: 9 INJECTION, SOLUTION INTRAVENOUS at 05:16

## 2018-10-22 RX ADMIN — Medication 10 ML: at 09:52

## 2018-10-22 RX ADMIN — SODIUM CHLORIDE: 9 INJECTION, SOLUTION INTRAVENOUS at 11:54

## 2018-10-22 RX ADMIN — ACETAMINOPHEN 1000 MG: 500 TABLET ORAL at 01:12

## 2018-10-22 RX ADMIN — HYDROCODONE BITARTRATE AND ACETAMINOPHEN 1 TABLET: 5; 325 TABLET ORAL at 22:58

## 2018-10-22 RX ADMIN — SODIUM CHLORIDE: 9 INJECTION, SOLUTION INTRAVENOUS at 00:10

## 2018-10-22 RX ADMIN — PROPOFOL 100 MG: 10 INJECTION, EMULSION INTRAVENOUS at 10:15

## 2018-10-22 RX ADMIN — SODIUM CHLORIDE: 9 INJECTION, SOLUTION INTRAVENOUS at 18:03

## 2018-10-22 ASSESSMENT — PAIN SCALES - GENERAL
PAINLEVEL_OUTOF10: 0
PAINLEVEL_OUTOF10: 8
PAINLEVEL_OUTOF10: 0
PAINLEVEL_OUTOF10: 0
PAINLEVEL_OUTOF10: 8
PAINLEVEL_OUTOF10: 0
PAINLEVEL_OUTOF10: 2
PAINLEVEL_OUTOF10: 0
PAINLEVEL_OUTOF10: 6
PAINLEVEL_OUTOF10: 0

## 2018-10-22 ASSESSMENT — PAIN DESCRIPTION - FREQUENCY
FREQUENCY: CONTINUOUS
FREQUENCY: INTERMITTENT

## 2018-10-22 ASSESSMENT — PAIN DESCRIPTION - DESCRIPTORS
DESCRIPTORS: ACHING
DESCRIPTORS: CRAMPING
DESCRIPTORS: PATIENT UNABLE TO DESCRIBE

## 2018-10-22 ASSESSMENT — PAIN DESCRIPTION - ORIENTATION
ORIENTATION: MID
ORIENTATION: MID
ORIENTATION: POSTERIOR;LOWER
ORIENTATION: MID

## 2018-10-22 ASSESSMENT — PAIN DESCRIPTION - PAIN TYPE
TYPE: ACUTE PAIN

## 2018-10-22 ASSESSMENT — PAIN DESCRIPTION - ONSET
ONSET: GRADUAL
ONSET: ON-GOING

## 2018-10-22 ASSESSMENT — PAIN DESCRIPTION - LOCATION
LOCATION: ABDOMEN
LOCATION: GENERALIZED
LOCATION: ABDOMEN
LOCATION: ABDOMEN
LOCATION: HEAD;BACK

## 2018-10-22 ASSESSMENT — PAIN DESCRIPTION - PROGRESSION
CLINICAL_PROGRESSION: RESOLVED
CLINICAL_PROGRESSION: GRADUALLY WORSENING

## 2018-10-22 NOTE — PROGRESS NOTES
815am new order per telephone TWE per Dr. Violeta Bhakta. Pt held approx 1/3 of it with return clear with some mucous like red to pink ? clots? But light in color on the bsc Dr. Mao Lezama ICU resident here and aware then returned to bed. Am blood work obtained off triple lumen for renal thyroid and cbc sent to lab.  Merline Cordero RN

## 2018-10-22 NOTE — PROGRESS NOTES
8am up to c voided 350 clear yellow urine without difficulty reese caredone per self and returned to bed.  Merline Cordero RN

## 2018-10-22 NOTE — PLAN OF CARE
Problem: Falls - Risk of:  Goal: Will remain free from falls  Will remain free from falls   Outcome: Ongoing  Pt is a fall risk. Fall risk protocol in place. See Lilian Muñoz Fall Score. Pt bed is in low position, bed alarm is on, side rails up, fall risk bracelet applied. , non-skid footwear in use. Patient/family educated on fall risk protocol, instructed to call for assistance when needed and belongings are in reach. assistance. Will continue with hourly rounds for po intake, pain needs, toileting and repositioning as needed. Will continue to monitor for needs. Problem: Bowel Function - Altered:  Goal: Bowel elimination is within specified parameters  Bowel elimination is within specified parameters   Outcome: Ongoing  Pt has had a couple small clot like pink colored mucous stools especially after TWE. Will continue to monitor. Luisana Stovall RN     Problem: Fluid Volume - Imbalance:  Goal: Will show no signs and symptoms of excessive bleeding  Will show no signs and symptoms of excessive bleeding   Outcome: Ongoing  Following h/h q6h had 2 units prbcs #2 completed 748am this am. 8.6 and 27 was h/h thereafter. 2 mucous pink stools noted will continue to monitor.  Luisana Stovall RN

## 2018-10-22 NOTE — ANESTHESIA PRE PROCEDURE
 metronidazole (FLAGYL) 500 mg in NaCl 100 mL IVPB premix  500 mg Intravenous Q8H Crow Mcdaniels MD   Stopped at 10/22/18 0439    [START ON 10/23/2018] influenza quadrivalent split vaccine (FLUZONE;FLUARIX;FLULAVAL;AFLURIA) injection 0.5 mL  0.5 mL Intramuscular Once Zbigniew Oden MD           Allergies: Allergies   Allergen Reactions    Codeine Hives and Other (See Comments)     \"freak out\"    Diazepam Anxiety       Problem List:    Patient Active Problem List   Diagnosis Code    Colitis K52.9    Lower GI bleed K92.2       Past Medical History:        Diagnosis Date    Anxiety     Depression     Graves disease     Graves disease     Graves' disease     Graves' disease     Hyperlipidemia     Rapid heart beat     Thyroid disease        Past Surgical History:        Procedure Laterality Date     SECTION         Social History:    Social History   Substance Use Topics    Smoking status: Current Every Day Smoker     Packs/day: 0.50    Smokeless tobacco: Never Used    Alcohol use No                                Ready to quit: Not Answered  Counseling given: Not Answered      Vital Signs (Current):   Vitals:    10/22/18 0730 10/22/18 0738 10/22/18 0748 10/22/18 0750   BP: 92/67  99/60    Pulse: 70  69    Resp: 14  14    Temp: 98 °F (36.7 °C) 98.2 °F (36.8 °C) 97.9 °F (36.6 °C)    TempSrc: Oral      SpO2: 93%   98%   Weight:       Height:                                                  BP Readings from Last 3 Encounters:   10/22/18 99/60   17 109/67   17 114/68       NPO Status:                                                                                 BMI:   Wt Readings from Last 3 Encounters:   10/22/18 219 lb 5.7 oz (99.5 kg)   17 220 lb (99.8 kg)   17 230 lb (104.3 kg)     Body mass index is 37.65 kg/m².     CBC:   Lab Results   Component Value Date    WBC 6.7 10/21/2018    RBC 3.66 10/21/2018    HGB 6.7 10/21/2018    HCT 22.7 10/21/2018    MCV 62.1 10/21/2018    RDW 21.8 10/21/2018     10/21/2018       CMP:   Lab Results   Component Value Date     10/21/2018    K 3.7 10/21/2018     10/21/2018    CO2 19 10/21/2018    BUN 5 10/21/2018    CREATININE 0.6 10/21/2018    GFRAA >60 10/21/2018    AGRATIO 1.1 02/12/2017    LABGLOM >60 10/21/2018    GLUCOSE 88 10/21/2018    PROT 7.8 10/21/2018    CALCIUM 8.7 10/21/2018    BILITOT 0.3 10/21/2018    ALKPHOS 74 10/21/2018    AST 19 10/21/2018    ALT 9 10/21/2018       POC Tests: No results for input(s): POCGLU, POCNA, POCK, POCCL, POCBUN, POCHEMO, POCHCT in the last 72 hours. Coags:   Lab Results   Component Value Date    PROTIME 12.3 10/21/2018    INR 1.08 10/21/2018       HCG (If Applicable):   Lab Results   Component Value Date    PREGTESTUR Negative 02/12/2017        ABGs: No results found for: PHART, PO2ART, POH7DBE, MJX8PFC, BEART, S5PDEEBR     Type & Screen (If Applicable):  No results found for: LABABO, [address]    Anesthesia Evaluation  Patient summary reviewed and Nursing notes reviewed no history of anesthetic complications:   Airway: Mallampati: II  TM distance: >3 FB   Neck ROM: full  Mouth opening: > = 3 FB Dental:          Pulmonary:Negative Pulmonary ROS                              Cardiovascular:    (+) hyperlipidemia                  Neuro/Psych:   (+) psychiatric history:            GI/Hepatic/Renal: Neg GI/Hepatic/Renal ROS            Endo/Other:    (+) hyperthyroidism::., .                 Abdominal:           Vascular:                                        Anesthesia Plan      general     ASA 1    (80-year-old female presents for EGD/sigmoidoscopy. Plan general anesthesia with ASA standard monitors. Questions answered. Patient agreeable with anesthetic plan.  )  Induction: intravenous. Anesthetic plan and risks discussed with patient. Plan discussed with CRNA.     Attending anesthesiologist reviewed and agrees with Justen Philip MD

## 2018-10-22 NOTE — PROGRESS NOTES
Report called to [facility] on 5th floor for [phone]. Request placed for transport to  bed and help with transport.  Vik Guevara RN

## 2018-10-22 NOTE — PROGRESS NOTES
Pt remains sleepy and dozing when  Undisturbed but awakens easily to touch. 02 kept on 2-3 liters nc now sats 99% but desats when off. bp hanging in the 80's sys Dr Kristina Gilbert informed ivf .09 ns 150ml ordered and started. Will c ontinue to monitor and treat.  Jackie Abdul RN

## 2018-10-22 NOTE — PROCEDURES
Margaret Massey is a 50 y.o. female patient. 1. Colitis    2. Lower abdominal pain    3. Lower GI bleeding    4. Acute on chronic anemia      Past Medical History:   Diagnosis Date    Anxiety     Depression     Graves disease     Graves disease     Graves' disease     Graves' disease     Hyperlipidemia     Rapid heart beat     Thyroid disease      Blood pressure (!) 99/56, pulse 65, temperature 98.2 °F (36.8 °C), temperature source Oral, resp. rate 16, height 5' 4\" (1.626 m), weight 219 lb (99.3 kg), last menstrual period 08/21/2018, SpO2 98 %, not currently breastfeeding. Central Line  Date/Time: 10/22/2018 2:16 AM  Performed by: Risa Ibarra  Authorized by: Risa Ibarra   Consent: Verbal consent obtained. Written consent obtained. Consent given by: patient  Patient understanding: patient states understanding of the procedure being performed  Patient consent: the patient's understanding of the procedure matches consent given  Procedure consent: procedure consent matches procedure scheduled  Relevant documents: relevant documents present and verified  Test results: test results available and properly labeled  Site marked: the operative site was marked  Imaging studies: imaging studies available  Patient identity confirmed: arm band and verbally with patient  Time out: Immediately prior to procedure a \"time out\" was called to verify the correct patient, procedure, equipment, support staff and site/side marked as required.   Indications: vascular access  Anesthesia: local infiltration    Anesthesia:  Local Anesthetic: lidocaine 1% without epinephrine  Anesthetic total: 7 mL    Sedation:  Patient sedated: no  Preparation: skin prepped with 2% chlorhexidine  Skin prep agent dried: skin prep agent completely dried prior to procedure  Sterile barriers: all five maximum sterile barriers used - cap, mask, sterile gown, sterile gloves, and large sterile sheet  Hand hygiene: hand hygiene performed prior to central venous catheter insertion  Location details: right internal jugular  Patient position: flat  Catheter type: triple lumen  Catheter size: 7 Fr  Pre-procedure: landmarks identified  Ultrasound guidance: yes  Sterile ultrasound techniques: sterile gel and sterile probe covers were used  Number of attempts: 1  Successful placement: yes  Post-procedure: line sutured and dressing applied  Assessment: blood return through all ports,  free fluid flow,  placement verified by x-ray and no pneumothorax on x-ray  Patient tolerance: Patient tolerated the procedure well with no immediate complications          Tala Crouch MD  10/22/2018

## 2018-10-22 NOTE — PROCEDURES
[facility]  Colonoscopy REPORT    Patient:  Yunier Hernandez                  1970    Date 10/22/2018    Endoscopist: SARAH Clemente     Indication:  GI bleed negative EGD     Medications:  MAC    Pre-Anesthesia Assessment:  I have reviewed and am in agreement with patient history and medication, including previous response to sedation. Prior to the procedure, a History and Physical was performed, and patient medications and allergies were reviewed. The risks and benefits of the procedure and the sedation options and risks were discussed with the patient. Complications included but were not limited to infection, bleeding, perforation, death, and missed lesions. All questions were answered and informed consent was obtained by Dr Edwina Hair. Patient identification and proposed procedure were verified by the physician and the nurse in the pre-procedure area and in the procedure room. Mallampatti: II  ASA Grade Assessment: 3    After reviewing the risks and benefits, the patient was deemed in satisfactory condition to undergo the procedure. The anesthesia plan was to use MAC sedation. Immediately prior to administration of medications, the patient was re-assessed for adequacy to receive sedatives and a time out was performed. Patient and healthcare providers were in agreement it was the correct patient and procedure. The heart rate, respiratory rate, oxygen saturations, blood pressure, adequacy of pulmonary ventilation, and response to care were monitored throughout the procedure. The physical status of the patient was re-assessed after the procedure. After adequate sedation was achieved in stepwise fashion a rectal examination was performed and showed no masses. The adult colonoscope was then advanced atraumatically into the rectum and advanced without difficulty to the cecum and a picture was obtained.       The scope was then withdrawn (>6minutes) with close inspection of the mucosa in a circumferential manor. No blood in colon, brown stool in cecum and right colon. Cecum not adequately visualized. Mild to moderate colitis in left colon several linear ulcerations, biopsies obtained, may be mild ischemic colitis. 1.2 cm polyp in short stalk near 13 cm, hot snared, good hemostasis. Retroflexion showed small internal hemorrhoids    No immediate complications. The preparation was poor in cecum and ascending, good elswwhere. Estimated blood loss trace    Impression:  Mild to moderate colitis in left colon several linear ulcerations, biopsies obtained, may be mild ischemic colitis  1.2 cm polyp in short stalk near 13 cm, hot snared, good hemostasis  Small internal hemorrhoids  Poor prep in cecum and small area of ascending    Plan:  The patient is aware it is their responsibility to call for biopsy results in 7 days.   Repeat colonoscopy short term to evaluate right colon  Stool studies

## 2018-10-22 NOTE — H&P
308, UA clean, lactate 0.6   -She was given a dose of flagyl and 2L IVF (with the 2nd liter given during transport). They had plans to give ciprofloxacin, but it was not sent in the ED. CT abd/pel shows very mild wall thickening involving the proximal sigmoid colon, uterine fibroids and L adrenal adenoma   -Vitals: non-tachycardic, soft blood pressures, afebrile   -negative pregnancy test    Past Medical History:          Diagnosis Date    Anxiety     Depression     Graves disease     Graves disease     Hyperlipidemia     Hypertension     Rapid heart beat     Thyroid disease        Past Surgical History:          Procedure Laterality Date     SECTION         Medications Prior to Admission:      Prior to Admission medications    Medication Sig Start Date End Date Taking? Authorizing Provider   ibuprofen (ADVIL;MOTRIN) 600 MG tablet Take 1 tablet by mouth every 6 hours as needed for Pain 17   RUDDY Dexter DO   hydrOXYzine (VISTARIL) 25 MG capsule Take 1 capsule by mouth 3 times daily as needed for Itching 17   Prasanth Sparks MD   dicyclomine (BENTYL) 10 MG capsule Take 1 capsule by mouth 4 times daily (before meals and nightly) 1/15/16   Theodore Gaucher, MD   ondansetron (ZOFRAN) 4 MG tablet Take 1 tablet by mouth every 8 hours as needed for Nausea or Vomiting 1/15/16   Theodore Gaucher, MD   atenolol (TENORMIN) 50 MG tablet Take 50 mg by mouth daily. Historical Provider, MD       Allergies:  Codeine and Diazepam    Social History:        TOBACCO:   reports that she has been smoking. She has been smoking about 0.50 packs per day. She has never used smokeless tobacco.  ETOH:  reports that she does not drink alcohol. Family History:     Reviewed in detail and negative for DM, CAD, Cancer, CVA. No history of IBD. REVIEW OF SYSTEMS: Pertinent positives as noted in the HPI. All other systems reviewed and negative.   ROS: Review of Systems   Constitutional: Positive for chills and fatigue. Negative for fever. Respiratory: Negative for shortness of breath. Cardiovascular: Negative for chest pain. Gastrointestinal: Positive for abdominal pain, blood in stool, diarrhea and nausea. Negative for vomiting. Genitourinary: Negative for dysuria and flank pain. Musculoskeletal: Positive for myalgias (bilateral thenar eminence). Neurological: Positive for light-headedness (when changing position) and headaches. PHYSICAL EXAM PERFORMED:    /63   Pulse 93   Temp 98.5 °F (36.9 °C) (Oral)   Resp 12   Ht 5' 4\" (1.626 m)   Wt 219 lb (99.3 kg)   LMP 08/21/2018   SpO2 100%   BMI 37.59 kg/m²     General appearance: Chills, uncomfortable, obese  HEENT:  Normal cephalic,atraumatic without obvious deformity. Pupils equal, round, and reactive to light. Extra ocular muscles intact. Conjunctivae/corneas clear. Neck: Obese, supple, with full range of motion. No jugular venous distention. Trachea midline. Respiratory:  Normal respiratory effort. Clear to auscultation, bilaterally without Rales/Wheezes/Rhonchi. Cardiovascular:  Regular rate and rhythm with normal S1/S2 without murmurs, rubs or gallops. Abdomen: Distended, TTP in bilateral lower abd, bowel sounds wnl  Rectal: no external hemorrhoids, no blood oozing from anus  Musculoskeletal:  No clubbing, cyanosis oredema bilaterally. Full range of motion without deformity. Skin: Skin color, texture, turgor normal.  Norashes or lesions. Neurologic:  Neurovascularly intact without any focal sensory/motor deficits.  Cranialnerves: II-XII intact, grossly non-focal.  Psychiatric:  Alert and oriented, thought content appropriate,normal insight  Capillary Refill: Brisk,< 3 seconds   Peripheral Pulses: +2 palpable, equal bilaterally       Labs:     Recent Labs      10/21/18   1427   WBC  8.5   HGB  7.7*   HCT  25.4*   PLT  308     Recent Labs      10/21/18   1427   NA  139   K  4.1   CL  103   CO2  24   BUN  5*   CREATININE  0.6 CALCIUM  9.3     Recent Labs      10/21/18   1427   AST  19   ALT  9*   BILIDIR  <0.2   BILITOT  0.3   ALKPHOS  74     Recent Labs      10/21/18   1427   INR  1.08     No results for input(s): CKTOTAL, TROPONINI in the last 72 hours. Urinalysis:      Lab Results   Component Value Date    NITRU Negative 12/24/2017    WBCUA 3-5 12/24/2017    BACTERIA 1+ 12/24/2017    RBCUA 0-2 12/24/2017    BLOODU TRACE-INTACT 12/24/2017    SPECGRAV <=1.005 12/24/2017    GLUCOSEU Negative 12/24/2017       Radiology:       CT ABDOMEN PELVIS W IV CONTRAST Additional Contrast? None   Final Result      1. Very mild wall thickening involving the proximal sigmoid colon may represent infectious or inflammatory colitis. Recommend follow-up colonoscopy. 2. Uterine fibroids. 3. Left adrenal adenoma. ASSESSMENT & PLAN:   Ms. Janeen Jenkins is a 51 yo female with pre-diabetes, Graves disease, vitamin D def, anxiety and chronic microcytic anemia who presents to [facility] from [facility] ED with BRBPR for < 24 hrs duration.     Lower GI Bleed possibly 2/2 infectious vs inflammatory colitis  -positive orthostatic hypotension   -lying 130/72 and HR 61, sitting 122/64 and HR 91, standing 115/61 and HR 93  -Hgb q 6 hr    -Hgb OSH 7.7  -type and screen, preparing 2U pRBCs  -2 large bore PIVs  -no external hemorrhoids noted on rectal examination, no oozing blood  -given 80 mg IV protonix at OSH, will continue 40 IV bid protonix  -GI consult   -NPO effective now  -holding prophylactic anticoagulation  -ICU team is aware of pt   -will continue cipro and flagyl started in OSH for possible infectious colitis  -stool culture and fecal leukocytes pending  -ESR and CRP pending  -dilauded 0.5 q 4 prn for pain  -no NSAIDs or ASA  -zofran for N/V    Acute on Chronic Microcytic Anemia  -chronic anemia may be 2/2 uterine fibroids  -iron studies pending  -reticulocyte count pending  -soluble transferrin receptor pending  -INR and plts wnl    Grave's Disease  -currently not on medications  -TSH 3.29 in Sept 2017  -TSH with reflex pending    Pre-Diabetes  -HgbA1c 6.1 Sept 2018  -PCP has been discussing metformin and weight loss      DVT Prophylaxis: None  Diet: Diet NPO Effective Now  Code Status: Full Code      Dispo - GMF       Saumya Huerta MD    I will discuss the patient with the senior resident and MD Saumya Moise MD  Internal Medicine Resident,PGY-1  Pager: [phone]

## 2018-10-22 NOTE — PROCEDURES
[facility]  EGD REPORT    Patient:  Kelley England                  1970    Date: 10/22/2018    Endoscopist: SARAH Clemente     Indication:  GI bleed     Medications:  MAC    Pre-Anesthesia Assessment:  I have reviewed and am in agreement with patient history and medication, including previous response to sedation. Prior to the procedure, a History and Physical was performed, and patient medications and allergies were reviewed. The patient is competent. The risks and benefits of the procedure and the sedation options and risks were discussed with the patient. Risks discussed included but were not limited to infection, bleeding, perforation, death, and missed lesions. All questions were answered and informed consent was obtained. Patient identification and proposed procedure were verified by the physician and the nurse in the pre-procedure area in the procedure room. [place]: II  ASA Grade Assessment: 3     After reviewing the risks and benefits, the patient was deemed in satisfactory condition to undergo the procedure. The anesthesia plan was to use MAC anesthesia. Immediately prior to administration of medications, the patient was re-assessed for adequacy to receive sedatives and a time out was performed. Patient and healthcare providers were in agreement it was the correct patient and procedure. The heart rate, respiratory rate, oxygen saturations, blood pressure, adequacy of pulmonary ventilation, and response to care were monitored throughout the procedure. The physical status of the patient was re-assessed after the procedure. After obtaining informed consent, the endoscope was passed under direct vision. The endoscope was introduced through the mouth, and advanced to the second part of duodenum. The EGD was accomplished without difficulty. The patient tolerated the procedure well.        Duodenum: Normal  Stomach: Normal.  Retroflexion did not show a hiatal hernia. Esophagus:  No Evidence of Tripathi's or esophagitis.     Estimated blood loss none    Plan:  Colonosocpy

## 2018-10-22 NOTE — PROGRESS NOTES
TRANSFER ACCEPTANCE NOTE:  S: The patient was seen and examined. Pt admitted earlier this evening around 9PM. HPI per medicine team below    \"Ms. Annie Kingsley is a 51 yo female with pre-diabetes, Graves disease, vitamin D def, anxiety and chronic microcytic anemia who presents to [facility] from [facility] ED with BRBPR for < 24 hrs duration. This morning around 3 am, the patient went to the bathroom to have a BM and felt like she was going to [place] out\" on the toilet. She had a small BM at that time. She proceeded to lower herself to the bathroom floor where she had episodes of fecal incontinence with BRBPR. She has been experiencing bilateral lower abdominal cramping. Her last BM was earlier this afternoon. She had never passed blood in her stool prior to today. She had a colonoscopy in  that was normal. She sees a GI who put her on fiber, probiotic and prozac (pro-kinetic properties) for IBS-constipation. She has been taking one 400 mg ibuprofen for a few days because of bilateral thenar eminence pain. She says this abdominal pain feels like menstrual cramps, but she hasn't had a menstrual cycle in 2 months. Her periods have been irregular for the last year - coming every 14 days and lasting 9 days. She is not on anticoagulation. She doesn't have a family history of Crohn's or UC. She denies chest pain and shortness of breath. She had a  many years ago but no other abdominal surgeries. She denies dysuria. She does endorse nausea and chills, but no fevers.      Her hemoglobin has been steadily dropping for years - Dec 2014 -14, 2016- 10.5, 2017- 9, 2017- 8.5. Her last pap smear was normal.      [facility] ED course: BUN 5, hepatic function panel wnl, lipase wnl, Hgb 7.7 with MCV 61.7, INR 1.08, plt 308, UA clean, lactate 0.6              -She was given a dose of flagyl and 2L IVF (with the 2nd liter given during transport). They had plans to give ciprofloxacin, but it was not sent in the ED.  CT abd/pel shows very mild wall thickening involving the proximal sigmoid colon, uterine fibroids and L adrenal adenoma              -Vitals: non-tachycardic, soft blood pressures, afebrile              -negative pregnancy test\"    51 yo female with Grave's disease, chronic microcytic anemia, anxiety, pre-diabetes and uterine fibroids who presents with 1 day of bloody diarrhea and abdominal pain. Hgb 7.7 at OSH. CT abd/pel shows mild thickening of prox sigmoid colon. GI consulted. NPO. 2U pRBCs. Iron studies, ESR, CRP pending. Vitals:    10/22/18 0350   BP: (!) 95/51   Pulse: 69   Resp: 12   Temp: 98.5 °F (36.9 °C)   SpO2: 95%     Scheduled Meds:   sodium chloride  1,000 mL Intravenous Once    sodium chloride flush  10 mL Intravenous 2 times per day    pantoprazole  40 mg Intravenous BID    ciprofloxacin  400 mg Intravenous Q12H    metroNIDAZOLE  500 mg Intravenous Q8H    [START ON 10/23/2018] influenza virus vaccine  0.5 mL Intramuscular Once     Continuous Infusions:  PRN Meds:sodium chloride flush, ondansetron    Temp  Av.3 °F (36.8 °C)  Min: 98 °F (36.7 °C)  Max: 98.5 °F (36.9 °C)  Pulse  Av.5  Min: 61  Max: 93  BP  Min: 70/59  Max: 130/72  SpO2  Av.6 %  Min: 95 %  Max: 100 %   Patient Vitals for the past 4 hrs:   BP Temp Temp src Pulse Resp SpO2   10/22/18 0430 (!) 95/53 98 °F (36.7 °C) Oral 88 16 95 %   10/22/18 0350 (!) 95/51 98.5 °F (36.9 °C) Oral 69 12 95 %   10/22/18 0322 101/66 98.5 °F (36.9 °C) Oral 68 13 96 %   10/22/18 0225 102/63 98 °F (36.7 °C) Oral 64 16 100 %       Intake/Output Summary (Last 24 hours) at 10/22/18 0441  Last data filed at 10/21/18 2146   Gross per 24 hour   Intake                0 ml   Output              400 ml   Net             -400 ml     Physical Exam   Constitutional: She is oriented to person, place, and time. She appears well-developed and well-nourished. She appears distressed (Anxious).    Visibly uncomfortable and tired appearing, obese body habitus HENT:   Head: Normocephalic and atraumatic. Nose: Nose normal.   Oropharynx clear but tacky salive   Eyes: Pupils are equal, round, and reactive to light. Conjunctivae and EOM are normal. No scleral icterus. Eyelids drooping, slight exopthalmos   Neck: Normal range of motion. Neck supple. No tracheal deviation present. R IJ in place, still slightly tender, C/D/I   Cardiovascular: Normal rate, regular rhythm, normal heart sounds and intact distal pulses. Pulmonary/Chest: Effort normal and breath sounds normal. No respiratory distress. She has no wheezes. She has no rales. Abdominal: Soft. Bowel sounds are normal. She exhibits no distension and no mass. There is no tenderness. There is no guarding. Genitourinary:   Genitourinary Comments: Per Medicine team \"No external hemorrhoids, no blood oozing from anus\"   Musculoskeletal: She exhibits no edema or tenderness. Moves all four extremities to command,  strength equal and appropriate   Neurological: She is alert and oriented to person, place, and time. No cranial nerve deficit or sensory deficit. Skin: Skin is warm and dry. No rash noted. She is not diaphoretic. No erythema. Psychiatric: She has a normal mood and affect. Her behavior is normal.   Visibly anxious s/p ativan   Vitals reviewed. Recent Labs      10/21/18   1427  10/21/18   2201   HGB  7.7*  6.7*     The objective and subjective findings as well as the course of treatment have been reviewed with the ICU team. The treatment plan has been reviewed with the ICU team. The patient is being transferred to ICU in stable condition. ASSESSMENT & PLAN:   Ms. Baldemar Minor is a 49 yo female with pre-diabetes, Graves disease, vitamin D def, anxiety and chronic microcytic anemia who presents to [facility] from [facility] ED with BRBPR for < 24 hrs duration. Active Hospital Problems    Diagnosis Date Noted    Colitis [K52.9] 10/21/2018    Lower GI bleed [K92.2] 10/21/2018     Respiratory  - cont. supp o2 as needed, wean as tolerated    Cardiovascular  Hypotension - SBP as low as 70s, likely 2/2 hypovolemia in setting of acute on chronic bleed w/ GI src, s/p 2L NS bolus, receiving additional 1L  - Tele monitoring  - Strict I/Os    Acute on Chronic Microcytic Anemia - chronic anemia may be 2/2 uterine fibroids, INR and plts wnl  - Fe/TIBC - pending  - reticulocyte ct - 1.34 Retic %, 0.049 Retic Ct, 0.49 immature retic fract  - soluble transferrin receptor - pending    Endocrine  Grave's Disease - TSH 3.29 in Sept 2017, currently not on medications  - TSH w/ reflex - pending    Pre-Diabetes - A1c 6.1 (9/2018)  - PCP discussion of metformin and wt loss    FEN/GI  Lower GI Bleed possibly 2/2 infectious vs inflammatory colitis - (+)orthostats (lying 130/72 HR 61, sitting 122/64  HR 91, standing 115/61 HR 93), receiving 2u pRBC tonight no maryam blood/oozing or external hemorrhoids on rectal exam s/p IV pantoprazole 80mg x1dose  - Hgb q6h  - IV pantoprazole 40mg BID  - GI consulted   - Recc's Appreciated   - HOLD DVT PPx  - HOLD NSAIDs/ASA  - cont. IV cipro 400mg q12h  - cont. IV flagyl 500mg q8h  - StoolCx - pending  - fecal leukocytes - pending  - ESR/CRP - 17/pending  - IV dilaudid 0.5mg q4h PRN pain  - zofran for N/V    Code: Full Code  FEN: Diet NPO Effective Now Exceptions are: Sips with Meds  PPX: HOLD 2/2 bleed, SCD's  Dispo: Transferred to ICU for closer monitoring    Sundar Llamas M.D.    Internal Medicine Resident, PGY-1  Pager: [phone]  10/22/2018, 4:18 AM

## 2018-10-22 NOTE — PROGRESS NOTES
Pt transferred from [place] [id number], Report given. Pt receiving 1st unit of PRBCS. Assessment complete, VSS. No acute S/S of distress noted at this time. Will continue to monitor.

## 2018-10-22 NOTE — PROGRESS NOTES
ICU Progress Note  PGY-1    Admit Date: 10/21/2018  Hospital Day: 2    ICU DAY  Code:Full Code  PCP: [facility]       SUBJECTIVE:   Interval Hx: Patient complains of fatigue and generalized weakness overnight. Denies fevers, but notes chills and cold sweats. She has had 2 bowel movements overnight, light read and mucus-like. Abdominal pain has improved. Denies chest pain, shortness of breath, nausea or vomiting. Patient afebrile overnight. BP remain soft; not on pressors. Required 2nd unit of PRBC 2/2 Hb 6.7. Patient has a central line that was in place prior to transfer to the unit. Access:   -Central Access Day #: 2                                  -Peripheral Access Day#:2      MEDICATIONS:   Scheduled Meds:   sodium chloride  250 mL Intravenous Once    sodium chloride flush  10 mL Intravenous 2 times per day    pantoprazole  40 mg Intravenous BID    ciprofloxacin  400 mg Intravenous Q12H    metroNIDAZOLE  500 mg Intravenous Q8H    [START ON 10/23/2018] influenza virus vaccine  0.5 mL Intramuscular Once      Continuous Infusions:  PRN Meds:acetaminophen, HYDROmorphone, HYDROmorphone, morphine, HYDROmorphone, oxyCODONE **OR** oxyCODONE, diphenhydrAMINE, metoclopramide, promethazine, labetalol, hydrALAZINE, meperidine, sodium chloride flush, ondansetron  Allergies: Allergies   Allergen Reactions    Codeine Hives and Other (See Comments)     \"freak out\"    Diazepam Anxiety       PHYSICAL EXAM:       Vitals: BP 99/60   Pulse 69   Temp 97.9 °F (36.6 °C)   Resp 14   Ht 5' 4\" (1.626 m)   Wt 219 lb 5.7 oz (99.5 kg)   LMP 08/21/2018   SpO2 98%   BMI 37.65 kg/m²   I/O:      Intake/Output Summary (Last 24 hours) at 10/22/18 0963  Last data filed at 10/22/18 0738   Gross per 24 hour   Intake          1948.33 ml   Output              400 ml   Net          1548.33 ml     I/O this shift:  In: 373.3 [Blood:373.3]  Out: -   I/O last 3 completed shifts:   In: 3831 [Blood:475; IV Piggyback:1100]  Out: 400 [Urine:400]    Physical Examination:   · General appearance: alert, fatigued. appears stated age and cooperative. · Skin: Skin color, texture, turgor normal.   · HEENT:  Normocephalic, no lesions, without obvious abnormality. PERRL. EOMI. · Neck: Right IJ in place without bleeding or oozing. no JVD, supple, symmetrical, trachea midline and thyroid not enlarged  · Lungs: clear to auscultation bilaterally. No wheezes or crackles  · Heart: regular rate and rhythm, S1, S2 normal, no murmur, click, rub or gallop  · Abdomen: Normoactive bowel sounds. Abdomen soft and non-distended. Tenderness to deep palpation in RLQ and LLQ. · Extremities: extremities normal, atraumatic, no cyanosis or edema  · Lymphatic: No significant lymph node enlargement papable  · Neurologic: CN II-XII grossly intact. Mental status: Alert, oriented, thought content appropriate. Muscle strength 5/5 throughout with preserved sensation. DATA:       Labs:  CBC:   Recent Labs      10/21/18   1427  10/21/18   2201   WBC  8.5  6.7   HGB  7.7*  6.7*   HCT  25.4*  22.7*   PLT  308  265       BMP:   Recent Labs      10/21/18   1427  10/21/18   2201   NA  139  142   K  4.1  3.7   CL  103  110   CO2  24  19*   BUN  5*  5*   CREATININE  0.6  0.6   GLUCOSE  114*  88     ABGs: No results for input(s): PHART, ETD4LCX, PO2ART in the last 72 hours. Rad:   EKG:     ASSESSMENT AND PLAN:   Ms. Day Segura is a 51 yo female with pre-diabetes, Graves disease, vitamin D def, anxiety and chronic microcytic anemia who presents to [facility] from [facility] ED with BRBPR for < 24 hrs duration.     Lower GI Bleed possibly 2/2 diverticular vs erosive PUD  Presented with multiple episodes of BRBPR. Normal colonoscopy 2013. Hb OSH 7.7. No leukocytosis. PT/INR wnl. No external hemorrhoids noted on rectal examination, no oozing blood. Positive orthostatic hypotension. S/p 3.25 L NS fluids total. s/p 2 units PRBC.   She admits to combination upper and lower abdominal pain. Patient afebrile w/o leukocytosis. Possible PUD w/ erosion vs. divterticular disease in the setting of constipation. -EGD + flex sigmoidoscopy today  -Hgb 6.7  -40 IV bid protonix  -NS IVF @ 125 mL/hr  -GI following  -NPO effective now  -holding prophylactic anticoagulation  -will continue cipro and flagyl started in OSH for possible infectious etiology  -stool culture and fecal leukocytes pending  -ESR 17  -CRP pending  -H&H q6hrs  -dilauded 0.5 q 4 prn for pain  -no NSAIDs or ASA  -zofran for N/V     Acute on Chronic Microcytic Anemia  Her hemoglobin has been steadily dropping for years - Dec 2014 -14, Jan 2016- 10.5, Feb 2017- 9, Sept 2017- 8.5. Her last pap smear was normal.  Hb 7.7 at OSH w/  MCV 61.7. PT/INR wnl.  -Reticulocyte fraction elevated 0.049; absolute and percentage wnl  -iron studies pending  -soluble transferrin receptor pending  -PT/INR wnl     Grave's Disease  -currently not on medications  -TSH 3.29 in Sept 2017  -TSH with reflex pending     Pre-Diabetes  -HgbA1c 6.1 Sept 2018  -PCP has been discussing metformin and weight loss     Code Status:Full Code  FEN: NPO  PPX: Protonix 40 IB BID  DISPO: ICU for further management as above. Discussed with attending   -----------------------------  Carmita Marino M.D.   PGY-1  Pager: [phone]  10/22/2018 9:28 AM     Addendum to Resident Progress Note  Pt seen,examined and evaluated.  I have reviewed the current history, physical findings, labs and assessment and plan and agree with note as documented by resident MD Dax Robbins MD  Attending Physician  Hospitalist Service

## 2018-10-22 NOTE — PROGRESS NOTES
Report received this am from [facility], blood completed at 748am tolerated well no adverse affects. Pt denies sob does c/o some abdominal discomfort \"like I have to have a bowel movement\" this am when assessed. Right ij triple lumen noted site unremarkable. ivf bolus completed of normal saline. bp was 80 sys initially with change of shift and increased thereafter. Pt had some sleep apnea noted with pulse oxy 85% placed on 2 liters nc up to 99%. Pt awakens to name oriented x 4. Abdomen soft with hypoactive bowel sounds. NSR on monitor afebrile.  Edwina Delgado RN

## 2018-10-22 NOTE — PROGRESS NOTES
bp improved with ivf and then ptmore awake and up to bsc at 1330pm tolerated it well then to chair. Taking ice chips and sips water then without difficulty. C/o abdominal cramping tylenol 650mg po given at 1333pm without good results Dr. Vanda Francisco here updated new orders for vicodin; vicodin 1 po given 1420pm with good results by 1515pm pt denies further c/o pain remained up in the chair with visitors at side.  Nestor Burkitt, RN

## 2018-10-22 NOTE — PROGRESS NOTES
Attempted to call report for room 7764 to [employer], RN. She was unable to take report at that time but she will call me back.  Aleah Ceballos RN

## 2018-10-22 NOTE — PROGRESS NOTES
Pt tolerated procedure fairly well. Report given to ICU RN Ginger Fowler who will resume pt care post procedure.

## 2018-10-22 NOTE — CONSULTS
[address] [place] [address]  GI Consultation      Patient: Kelley England  : 1970       Date:  10/22/2018    Subjective:       History of Present Illness  Patient is a 50 y.o.  female  who is seen in consult for GI bleed. 24 hour history of lower abdominal cramping pain, weakness, pre-syncope. Early this AM passes several brown stools then multiple large volume BRB stools, associated with extreme weakness and pre-syncope. Last episode in ER at [facility]. Was orthostatic , vitals now improved bit BP is soft. No nausea/emesis, hematemesis, melena, fever/chills. Cramping is intermittently subsided at present. CT shoed ? Mild sigmoid thickening. Last colonoscopy  reported as unremarkable, no polyps, did have diverticulosis and internal hemorrhoids. Takes 2 Ibuprofen a day for last 2 weeks. Past Medical History:   Diagnosis Date    Anxiety     Depression     Graves disease     Graves disease     Graves' disease     Graves' disease     Hyperlipidemia     Rapid heart beat     Thyroid disease       Past Surgical History:   Procedure Laterality Date     SECTION          Admission Meds  No current facility-administered medications on file prior to encounter. Current Outpatient Prescriptions on File Prior to Encounter   Medication Sig Dispense Refill    ibuprofen (ADVIL;MOTRIN) 600 MG tablet Take 1 tablet by mouth every 6 hours as needed for Pain 30 tablet 0    atenolol (TENORMIN) 50 MG tablet Take 50 mg by mouth daily.       hydrOXYzine (VISTARIL) 25 MG capsule Take 1 capsule by mouth 3 times daily as needed for Itching 15 capsule 0    dicyclomine (BENTYL) 10 MG capsule Take 1 capsule by mouth 4 times daily (before meals and nightly) 20 capsule 0    ondansetron (ZOFRAN) 4 MG tablet Take 1 tablet by mouth every 8 hours as needed for Nausea or Vomiting 12 tablet 0         Allergies  Allergies   Allergen Reactions    Codeine Hives and Other (See Comments)     \"freak images are provided for review. Up-to-date CT equipment and radiation dose reduction techniques were employed.       IV Contrast: 80 cc Isovue 370   Oral Contrast: No.       FINDINGS:       The visualized lung bases are clear. The heart size is normal without pericardial effusion.       The liver enhances homogenously. No focal intrahepatic lesions are seen. The gallbladder is normal without evidence of gallstones or gallbladder wall thickening. There is no intrahepatic or extrahepatic biliary ductal dilatation. The spleen enhances    homogenously. The pancreas is normal. The right adrenal gland appears normal. A 1.5 cm left adrenal adenoma is unchanged. The kidneys enhance symmetrically bilaterally. There is no hydronephrosis or hydroureter.       Diastasis recti is noted. The stomach is normal. The small bowel is normal in course and caliber. There is no evidence of bowel obstruction. The appendix appears normal. There is very mild short segment circumferential wall thickening involving the    proximal sigmoid colon with minimal surrounding stranding. No diverticuli are seen. No lymphadenopathy is seen. The abdominal aorta is normal in course and caliber. The remaining enhanced abdominal vascular structures are normal. No free intraperitoneal    air is seen.       The urinary bladder appears normal. The uterus contains fibroids. There is no free fluid in the pelvis.       The osseous structures are intact. No definite sacroiliitis is seen.           Impression       1. Very mild wall thickening involving the proximal sigmoid colon may represent infectious or inflammatory colitis. Recommend follow-up colonoscopy.       2. Uterine fibroids.       3. Left adrenal adenoma. Assessment:     GI bleed  Blood loss anemia acute on chronic    Likely lower ( colitis, ischemic colitis, diverticular etc) but with NSAID use and orthostasis need to exclude brisk ulcer bleed. BUN not elevated but significant anemia.  Has stabilized now is receiving PRBC. Was put on Cipro and Flagyl not sure if stool studies done prior but no recent diarrhea before last 24 hours      Recommendations:     EGD and Flex sig this AM  Serial labs  PPI for now until EGD done    Thank you for the opportunity to participate in [id number] [facility].

## 2018-10-22 NOTE — PROGRESS NOTES
C/o nausea and abdominal pain and needing to void at 10am. Up to bsc 100ml urine flecks mucous otherwise. Returned to bed zofran 4mg ivp given then natacha and Dr. Violeta Bhakta here for egd and flex sigmoidoscopy.  Merline Cordero RN

## 2018-10-22 NOTE — PROGRESS NOTES
4 Eyes Admission Assessment     I agree as the admission nurse that 2 RN's have performed a thorough Head to Toe Skin Assessment on the patient. ALL assessment sites listed below have been assessed on admission. Areas assessed by both nurses:yes  [x]   Head, Face, and Ears   [x]   Shoulders, Back, and Chest  [x]   Arms, Elbows, and Hands   [x]   Coccyx, Sacrum, and Ischum  [x]   Legs, Feet, and Heels        Does the Patient have Skin Breakdown?  No        Melvin Prevention initiated:  No   Wound Care Orders initiated:  No      [facility] nurse consulted for Pressure Injury (Stage 3,4, Unstageable, DTI, NWPT, and Complex wounds):  No      Nurse 1 eSignature: Electronically signed by Isaac Davis RN on 10/22/18 at 6:45 AM    **SHARE this note so that the co-signing nurse is able to place an eSignature**    Nurse 2 eSignature: Electronically signed by Evette Corral RN on 10/22/18 at 6:48 AM

## 2018-10-22 NOTE — PROGRESS NOTES
Back in bed late afternoon dozing off/on. Pt's Mother remains at bedside. Denies any further c/o. Taking clear liquids without difficulty.  Edwina Delgado RN

## 2018-10-22 NOTE — PROGRESS NOTES
1930pm transferred to [id number] via bed belongings nurse transport. Micki Soler RN received pt.  Ba Deluca RN

## 2018-10-23 LAB
BASOPHILS ABSOLUTE: 0 K/UL (ref 0–0.2)
BASOPHILS RELATIVE PERCENT: 0.4 %
EOSINOPHILS ABSOLUTE: 0.1 K/UL (ref 0–0.6)
EOSINOPHILS RELATIVE PERCENT: 1.9 %
HCT VFR BLD CALC: 24.6 % (ref 36–48)
HCT VFR BLD CALC: 24.7 % (ref 36–48)
HCT VFR BLD CALC: 25.3 % (ref 36–48)
HEMOGLOBIN: 7.6 G/DL (ref 12–16)
HEMOGLOBIN: 7.7 G/DL (ref 12–16)
HEMOGLOBIN: 8 G/DL (ref 12–16)
LYMPHOCYTES ABSOLUTE: 1.4 K/UL (ref 1–5.1)
LYMPHOCYTES RELATIVE PERCENT: 25.3 %
MCH RBC QN AUTO: 20.8 PG (ref 26–34)
MCHC RBC AUTO-ENTMCNC: 31.1 G/DL (ref 31–36)
MCV RBC AUTO: 67 FL (ref 80–100)
MONOCYTES ABSOLUTE: 0.4 K/UL (ref 0–1.3)
MONOCYTES RELATIVE PERCENT: 6.6 %
NEUTROPHILS ABSOLUTE: 3.7 K/UL (ref 1.7–7.7)
NEUTROPHILS RELATIVE PERCENT: 65.8 %
PDW BLD-RTO: 25 % (ref 12.4–15.4)
PLATELET # BLD: 207 K/UL (ref 135–450)
PMV BLD AUTO: 8.3 FL (ref 5–10.5)
RBC # BLD: 3.66 M/UL (ref 4–5.2)
SOLUBLE TRANSFERRIN RECEPT: 18 MG/L (ref 1.9–4.4)
WBC # BLD: 5.7 K/UL (ref 4–11)

## 2018-10-23 PROCEDURE — 96376 TX/PRO/DX INJ SAME DRUG ADON: CPT

## 2018-10-23 PROCEDURE — 6360000002 HC RX W HCPCS: Performed by: INTERNAL MEDICINE

## 2018-10-23 PROCEDURE — 6370000000 HC RX 637 (ALT 250 FOR IP): Performed by: INTERNAL MEDICINE

## 2018-10-23 PROCEDURE — 85018 HEMOGLOBIN: CPT

## 2018-10-23 PROCEDURE — 6360000002 HC RX W HCPCS: Performed by: STUDENT IN AN ORGANIZED HEALTH CARE EDUCATION/TRAINING PROGRAM

## 2018-10-23 PROCEDURE — 96366 THER/PROPH/DIAG IV INF ADDON: CPT

## 2018-10-23 PROCEDURE — 1200000000 HC SEMI PRIVATE

## 2018-10-23 PROCEDURE — 96365 THER/PROPH/DIAG IV INF INIT: CPT

## 2018-10-23 PROCEDURE — 96375 TX/PRO/DX INJ NEW DRUG ADDON: CPT

## 2018-10-23 PROCEDURE — 2580000003 HC RX 258: Performed by: INTERNAL MEDICINE

## 2018-10-23 PROCEDURE — G0378 HOSPITAL OBSERVATION PER HR: HCPCS

## 2018-10-23 PROCEDURE — G0008 ADMIN INFLUENZA VIRUS VAC: HCPCS | Performed by: INTERNAL MEDICINE

## 2018-10-23 PROCEDURE — 90686 IIV4 VACC NO PRSV 0.5 ML IM: CPT | Performed by: INTERNAL MEDICINE

## 2018-10-23 PROCEDURE — 2580000003 HC RX 258: Performed by: STUDENT IN AN ORGANIZED HEALTH CARE EDUCATION/TRAINING PROGRAM

## 2018-10-23 PROCEDURE — 85025 COMPLETE CBC W/AUTO DIFF WBC: CPT

## 2018-10-23 PROCEDURE — 85014 HEMATOCRIT: CPT

## 2018-10-23 PROCEDURE — 2500000003 HC RX 250 WO HCPCS: Performed by: STUDENT IN AN ORGANIZED HEALTH CARE EDUCATION/TRAINING PROGRAM

## 2018-10-23 RX ORDER — PROMETHAZINE HYDROCHLORIDE 25 MG/ML
12.5 INJECTION, SOLUTION INTRAMUSCULAR; INTRAVENOUS EVERY 6 HOURS PRN
Status: DISCONTINUED | OUTPATIENT
Start: 2018-10-23 | End: 2018-10-25 | Stop reason: HOSPADM

## 2018-10-23 RX ORDER — NICOTINE 21 MG/24HR
1 PATCH, TRANSDERMAL 24 HOURS TRANSDERMAL DAILY
Status: DISCONTINUED | OUTPATIENT
Start: 2018-10-23 | End: 2018-10-25 | Stop reason: HOSPADM

## 2018-10-23 RX ADMIN — METRONIDAZOLE 500 MG: 500 INJECTION, SOLUTION INTRAVENOUS at 10:41

## 2018-10-23 RX ADMIN — ONDANSETRON 4 MG: 2 INJECTION INTRAMUSCULAR; INTRAVENOUS at 13:07

## 2018-10-23 RX ADMIN — METRONIDAZOLE 500 MG: 500 INJECTION, SOLUTION INTRAVENOUS at 02:18

## 2018-10-23 RX ADMIN — HYDROCODONE BITARTRATE AND ACETAMINOPHEN 1 TABLET: 5; 325 TABLET ORAL at 04:06

## 2018-10-23 RX ADMIN — IRON SUCROSE 200 MG: 20 INJECTION, SOLUTION INTRAVENOUS at 15:30

## 2018-10-23 RX ADMIN — HYDROCODONE BITARTRATE AND ACETAMINOPHEN 1 TABLET: 5; 325 TABLET ORAL at 09:31

## 2018-10-23 RX ADMIN — CIPROFLOXACIN 400 MG: 2 INJECTION, SOLUTION INTRAVENOUS at 22:09

## 2018-10-23 RX ADMIN — CIPROFLOXACIN 400 MG: 2 INJECTION, SOLUTION INTRAVENOUS at 09:31

## 2018-10-23 RX ADMIN — METRONIDAZOLE 500 MG: 500 INJECTION, SOLUTION INTRAVENOUS at 18:39

## 2018-10-23 RX ADMIN — INFLUENZA A VIRUS A/MICHIGAN/45/2015 X-275 (H1N1) ANTIGEN (FORMALDEHYDE INACTIVATED), INFLUENZA A VIRUS A/SINGAPORE/INFIMH-16-0019/2016 IVR-186 (H3N2) ANTIGEN (FORMALDEHYDE INACTIVATED), INFLUENZA B VIRUS B/PHUKET/3073/2013 ANTIGEN (FORMALDEHYDE INACTIVATED), AND INFLUENZA B VIRUS B/MARYLAND/15/2016 BX-69A ANTIGEN (FORMALDEHYDE INACTIVATED) 0.5 ML: 15; 15; 15; 15 INJECTION, SUSPENSION INTRAMUSCULAR at 10:41

## 2018-10-23 RX ADMIN — SODIUM CHLORIDE: 9 INJECTION, SOLUTION INTRAVENOUS at 02:19

## 2018-10-23 RX ADMIN — SODIUM CHLORIDE: 9 INJECTION, SOLUTION INTRAVENOUS at 22:09

## 2018-10-23 RX ADMIN — ONDANSETRON 4 MG: 2 INJECTION INTRAMUSCULAR; INTRAVENOUS at 20:32

## 2018-10-23 RX ADMIN — PROMETHAZINE HYDROCHLORIDE 12.5 MG: 25 INJECTION INTRAMUSCULAR; INTRAVENOUS at 15:30

## 2018-10-23 ASSESSMENT — PAIN DESCRIPTION - DESCRIPTORS
DESCRIPTORS: CRAMPING
DESCRIPTORS: ACHING

## 2018-10-23 ASSESSMENT — PAIN DESCRIPTION - LOCATION
LOCATION: ABDOMEN
LOCATION: GENERALIZED

## 2018-10-23 ASSESSMENT — PAIN DESCRIPTION - ORIENTATION: ORIENTATION: MID;LOWER

## 2018-10-23 ASSESSMENT — PAIN DESCRIPTION - PAIN TYPE
TYPE: ACUTE PAIN
TYPE: ACUTE PAIN

## 2018-10-23 ASSESSMENT — PAIN DESCRIPTION - PROGRESSION
CLINICAL_PROGRESSION: GRADUALLY WORSENING
CLINICAL_PROGRESSION: GRADUALLY IMPROVING

## 2018-10-23 ASSESSMENT — PAIN SCALES - GENERAL
PAINLEVEL_OUTOF10: 6
PAINLEVEL_OUTOF10: 6
PAINLEVEL_OUTOF10: 0

## 2018-10-23 ASSESSMENT — PAIN DESCRIPTION - FREQUENCY
FREQUENCY: INTERMITTENT
FREQUENCY: CONTINUOUS

## 2018-10-23 ASSESSMENT — PAIN DESCRIPTION - ONSET
ONSET: GRADUAL
ONSET: GRADUAL

## 2018-10-23 NOTE — PROGRESS NOTES
Pt c/o neck pain and reports swelling after having EGD. Notified Dr. Paula Pearl, received orders for STAT CT. Administered PRN Norco per orders. Pt reports relief from medications.

## 2018-10-23 NOTE — PROGRESS NOTES
Patient A&O x4, VSS, complains of nausea and abdominal pain, medications given as ordered, able to ambulate in room to bathroom, voiding well, has not had any bowel movements this shift. No further needs at this time. Will continue to monitor.

## 2018-10-23 NOTE — PROGRESS NOTES
[facility]  GI Progress Note        Yoanna Boyd is a 50 y.o. female patient. 1. Colitis    2. Lower abdominal pain    3. Lower GI bleeding    4. Acute on chronic anemia    5.  Lower GI bleed        Admit Date: 10/21/2018    Subjective:       No rectal bleeding, abd pain much less, mild right sided neck pain near IJ catheter    Scheduled Meds:   iron sucrose  200 mg Intravenous Q24H    sodium chloride flush  10 mL Intravenous 2 times per day    pantoprazole  40 mg Intravenous BID    ciprofloxacin  400 mg Intravenous Q12H    metroNIDAZOLE  500 mg Intravenous Q8H    influenza virus vaccine  0.5 mL Intramuscular Once       Continuous Infusions:   sodium chloride 150 mL/hr at 10/23/18 0219       PRN Meds:  acetaminophen, HYDROcodone 5 mg - acetaminophen, sodium chloride flush, ondansetron      Objective:       Patient Vitals for the past 24 hrs:   BP Temp Temp src Pulse Resp SpO2 Weight   10/23/18 0600 - - - - - - 241 lb 6.5 oz (109.5 kg)   10/23/18 0356 104/67 98 °F (36.7 °C) Oral 69 18 97 % -   10/23/18 0013 94/63 96.6 °F (35.9 °C) Axillary 66 18 98 % -   10/22/18 1940 125/77 97.6 °F (36.4 °C) Oral 68 18 100 % -   10/22/18 1823 (!) 89/50 - - 70 16 - -   10/22/18 1810 - - - 59 13 - -   10/22/18 1805 - - - 66 17 - -   10/22/18 1804 (!) 95/59 - - 70 18 - -   10/22/18 1800 (!) 87/47 - - 69 14 - -   10/22/18 1757 - - - 65 14 - -   10/22/18 1744 - - - 78 19 - -   10/22/18 1731 - - - 69 25 - -   10/22/18 1718 - - - 85 29 - -   10/22/18 1707 113/63 - - 76 19 - -   10/22/18 1700 - - - 62 14 100 % -   10/22/18 1652 - - - 68 29 - -   10/22/18 1639 - - - 74 18 - -   10/22/18 1626 - - - 66 20 - -   10/22/18 1613 - - - 66 16 - -   10/22/18 1605 - 98 °F (36.7 °C) Oral - - - -   10/22/18 1600 105/61 - - 67 18 - -   10/22/18 1547 - - - 65 15 - -   10/22/18 1534 - - - 74 - - -   10/22/18 1500 (!) 102/50 - - 70 11 - -   10/22/18 1400 105/60 - - 70 16 - -   10/22/18 1357 - - - 65 15 99 % -   10/22/18 1356 (!) 110/55 - - 68 24 - -   10/22/18 1350 - - - 95 19 - -   10/22/18 1349 - - - 65 19 - -   10/22/18 1348 - - - 64 14 - -   10/22/18 1347 - - - 67 13 - -   10/22/18 1346 - - - 64 13 - -   10/22/18 1345 - - - 68 21 - -   10/22/18 1344 - - - 88 25 - -   10/22/18 1343 - - - 65 16 - -   10/22/18 1342 - - - 64 17 - -   10/22/18 1341 - - - 74 25 - -   10/22/18 1340 - - - 63 13 - -   10/22/18 1339 - - - 74 27 - -   10/22/18 1338 (!) 125/58 - - 67 21 - -   10/22/18 1337 - - - 64 18 - -   10/22/18 1336 - - - 65 18 - -   10/22/18 1335 - - - 69 16 - -   10/22/18 1334 - - - 76 20 - -   10/22/18 1333 - - - 92 22 - -   10/22/18 1332 - - - 89 22 - -   10/22/18 1331 - - - 79 25 - -   10/22/18 1330 - - - 81 18 - -   10/22/18 1329 - - - 78 25 - -   10/22/18 1328 - - - 64 12 - -   10/22/18 1327 - - - 68 16 - -   10/22/18 1326 - - - 70 21 - -   10/22/18 1325 - - - 67 17 - -   10/22/18 1324 - - - 62 19 - -   10/22/18 1323 - - - 62 11 - -   10/22/18 1322 - - - 66 17 - -   10/22/18 1321 - - - 66 17 - -   10/22/18 1320 - - - 80 19 - -   10/22/18 1319 - - - 66 15 - -   10/22/18 1318 - - - 67 14 - -   10/22/18 1317 - - - 64 18 - -   10/22/18 1316 - - - 65 11 - -   10/22/18 1315 - - - 72 18 - -   10/22/18 1314 - - - 86 22 - -   10/22/18 1313 - - - 55 12 - -   10/22/18 1312 - - - 66 14 100 % -   10/22/18 1311 - - - 69 15 100 % -   10/22/18 1310 - - - 75 16 100 % -   10/22/18 1309 - - - 68 13 100 % -   10/22/18 1308 - - - 75 14 100 % -   10/22/18 1307 - - - 69 16 100 % -   10/22/18 1306 - - - 66 16 100 % -   10/22/18 1305 - - - 73 14 100 % -   10/22/18 1304 - - - 74 15 100 % -   10/22/18 1303 - - - 66 17 100 % -   10/22/18 1302 - - - 73 13 - -   10/22/18 1301 - - - 70 12 - -   10/22/18 1300 131/82 - - 64 16 - -   10/22/18 1259 - - - 67 14 - -   10/22/18 1254 - - - 67 12 - -   10/22/18 1253 - - - 65 15 100 % -   10/22/18 1252 - - - 65 17 100 % -   10/22/18 1251 - - - 68 11 100 % -   10/22/18 1250 - - - 65 15 100 % -   10/22/18 1249 - - - 66 13 - - - - 65 15 99 % -   10/22/18 1142 - - - 68 15 100 % -   10/22/18 1141 (!) 82/52 - - 85 19 97 % -   10/22/18 1140 (!) 87/48 - - 69 17 97 % -   10/22/18 1139 - - - 70 17 95 % -   10/22/18 1138 - - - 68 18 97 % -   10/22/18 1137 - - - 69 19 96 % -   10/22/18 1136 - - - 61 17 97 % -   10/22/18 1135 (!) 86/51 - - 67 17 99 % -   10/22/18 1134 - - - 69 18 97 % -   10/22/18 1133 - - - 71 18 98 % -   10/22/18 1132 - - - 65 15 98 % -   10/22/18 1131 - - - 69 15 98 % -   10/22/18 1130 (!) 91/49 - - 65 15 99 % -   10/22/18 1129 - - - 66 14 99 % -   10/22/18 1128 - - - 73 19 99 % -   10/22/18 1127 - - - 68 16 99 % -   10/22/18 1126 - - - 71 15 100 % -   10/22/18 1125 (!) 88/55 - - 67 16 99 % -   10/22/18 1124 - - - 71 16 100 % -   10/22/18 1123 - - - 70 17 99 % -   10/22/18 1122 - - - 67 16 99 % -   10/22/18 1121 (!) 88/50 - - 66 16 99 % -   10/22/18 1120 (!) 79/49 - - 68 16 100 % -   10/22/18 1119 - - - 70 18 98 % -   10/22/18 1118 - - - 68 16 100 % -   10/22/18 1117 - - - 67 16 98 % -   10/22/18 1116 - - - 67 17 99 % -   10/22/18 1115 (!) 87/53 - - 67 17 100 % -   10/22/18 1114 - - - 71 17 99 % -   10/22/18 1113 - - - 68 15 99 % -   10/22/18 1112 - - - 69 17 100 % -   10/22/18 1111 - - - 69 18 100 % -   10/22/18 1110 (!) 86/52 - - 67 16 100 % -   10/22/18 1109 - - - 68 16 100 % -   10/22/18 1108 - - - 67 17 100 % -   10/22/18 1107 - - - 70 16 100 % -   10/22/18 1106 - - - 70 16 99 % -   10/22/18 1105 (!) 90/53 - - 84 20 90 % -   10/22/18 1103 - - - 76 19 92 % -   10/22/18 1102 - - - 73 19 93 % -   10/22/18 1101 - - - 74 19 91 % -   10/22/18 1100 (!) 91/51 - - 73 21 95 % -   10/22/18 1059 - - - 75 19 93 % -   10/22/18 1058 - - - 75 20 96 % -   10/22/18 1057 - - - 74 19 96 % -   10/22/18 1056 - - - 77 19 94 % -   10/22/18 1055 (!) 91/53 - - 74 17 98 % -   10/22/18 1054 - - - 77 18 98 % -   10/22/18 1053 (!) 88/57 - - 75 19 96 % -   10/22/18 1001 98/61 - - 67 19 - -   10/22/18 0750 - - - - - 98 % -       Exam:  VITALS:  BP 104/67   Pulse 69   Temp 98 °F (36.7 °C) (Oral)   Resp 18   Ht 5' 4\" (1.626 m)   Wt 241 lb 6.5 oz (109.5 kg)   LMP 2018   SpO2 97%   BMI 41.44 kg/m²   TEMPERATURE:  Current - Temp: 98 °F (36.7 °C);  Max - Temp  Av.6 °F (36.4 °C)  Min: 96.6 °F (35.9 °C)  Max: 98 °F (36.7 °C)    NAD  General appearance: alert, appears stated age, cooperative and no distress  Abdomen: soft, non-tender; bowel sounds normal; no masses,  no organomegaly  AAOx3, No asterixis or encephalopathy      Recent Labs      10/21/18   1427  10/21/18   2201  10/22/18   0843  10/22/18   1345  10/22/18   2050  10/23/18   0403   WBC  8.5  6.7  5.3   --    --    --    HGB  7.7*  6.7*  8.6*  8.8*  8.5*  8.0*   HCT  25.4*  22.7*  27.5*  27.9*  27.0*  25.3*   MCV  61.7*  62.1*  68.4*   --    --    --    PLT  308  265  219   --    --    --      Recent Labs      10/21/18   1427  10/21/18   2201  10/22/18   0843   NA  139  142  140   K  4.1  3.7  3.7   CL  103  110  107   CO2  24  19*  22   PHOS   --    --   2.7   BUN  5*  5*  5*   CREATININE  0.6  0.6  0.5*     Recent Labs      10/21/18   1427   AST  19   ALT  9*   BILIDIR  <0.2   BILITOT  0.3   ALKPHOS  74     Recent Labs      10/21/18   1427   LIPASE  19.0     Recent Labs      10/21/18   1427   PROT  7.8   INR  1.08     Radiology review:   Patient: Shira Mathur  Time Out: 00:20   Exam(s): CT NECK Without Contrast        EXAM:     CT Neck Without Intravenous Contrast       CLINICAL HISTORY:      Reason for exam: SWELLING, NECK .       TECHNIQUE:     Axial computed tomography images of the neck without intravenous    contrast.  CTDI is 74 mGy and DLP is 654 mGy-cm.  All CT scans at this    facility use dose modulation, iterative reconstruction, and/or weight    based dosing when appropriate to reduce radiation dose to as low as    reasonably achievable.       COMPARISON:     No relevant prior studies available.       FINDINGS:     Oropharynx:  Unremarkable.  No significant tonsillar enlargement.     Hypopharynx:  Unremarkable.     Larynx:  Unremarkable.  Normal epiglottis.     Trachea:  Unremarkable.     Retropharyngeal space:  Unremarkable.     Submandibular/parotid glands:  Unremarkable.  Glands are normal in size.         Thyroid:  Unremarkable.  No enlarged or calcified nodules.     Bones/joints:  No acute fracture.     Soft tissues:  Unremarkable.     Vasculature:  No acute findings.     Lymph nodes:  Unremarkable.  No lymphadenopathy.     Lung apices:  Unremarkable as visualized.           Impression     Normal neck CT.        Assessment:       Colitis  Colon polyp    No bleeding H/H stable, abdominal pain improved      Recommendations:       Advance diet  Awaiting path results  Will need FU colonoscopy in future due to poor prep in right colon  DC PPI    Brian White [facility]  10/23/2018  7:49 AM

## 2018-10-23 NOTE — PROGRESS NOTES
Hospitalist Progress Note      PCP: [address]    Date of Admission: 10/21/2018    Chief Complaint on Admission: lower GI bleed    Pt Seen/Examined and Chart Reviewed. Admitting dx same    SUBJECTIVE/OBJECTIVE:   She feels achy after flu shot today. Nausea is present. She could not eat hospital grits, family is brining some form home. No bowel movements since ICU transter. No vomiting. Allergies  Codeine and Diazepam    Medications      Scheduled Meds:   nicotine  1 patch Transdermal Daily    iron sucrose  200 mg Intravenous Q24H    sodium chloride flush  10 mL Intravenous 2 times per day    ciprofloxacin  400 mg Intravenous Q12H    metroNIDAZOLE  500 mg Intravenous Q8H       Infusions:   sodium chloride 150 mL/hr at 10/23/18 0219       PRN Meds:  acetaminophen, HYDROcodone 5 mg - acetaminophen, sodium chloride flush, ondansetron    Vitals    TEMPERATURE:  Current - Temp: 98.4 °F (36.9 °C); Max - Temp  Av.7 °F (36.5 °C)  Min: 96.6 °F (35.9 °C)  Max: 98.4 °F (36.9 °C)  RESPIRATIONS RANGE: Resp  Av.4  Min: 11  Max: 29  PULSE RANGE: Pulse  Av  Min: 55  Max: 95  BLOOD PRESSURE RANGE:  Systolic (44FFR), BIF:764 , Min:87 , XQO:738   ; Diastolic (80RSS), JA, Min:47, Max:82    PULSE OXIMETRY RANGE: SpO2  Av.3 %  Min: 96 %  Max: 100 %  24HR INTAKE/OUTPUT:    Intake/Output Summary (Last 24 hours) at 10/23/18 1258  Last data filed at 10/23/18 0931   Gross per 24 hour   Intake           4943.5 ml   Output              750 ml   Net           4193.5 ml       Exam:      General appearance: No apparent distress, appears stated age and cooperative. Lungs: Clear to ascultation, bilaterally without Rales/Wheezes/Rhonchi with good respiratory effort.   Heart: Regular rate and rhythm with Normal S1/S2 without  murmurs, rubs or gallops, point of maximum impulse non-displaced  Abdomen: Soft, tender in right upper and lower quadrants, mildly distended without rigidity or guarding and positive bowel sounds all four quadrants. Extremities: No clubbing, cyanosis, or edema bilaterally. Skin: Skin color, texture, turgor normal.   Neurologic: Alert and oriented X 3,   grossly non-focal.  Mental status: Alert, oriented, thought content appropriate. Data    Recent Labs      10/21/18   1427  10/21/18   2201  10/22/18   0843  10/22/18   1345  10/22/18   2050  10/23/18   0403   WBC  8.5  6.7  5.3   --    --    --    HGB  7.7*  6.7*  8.6*  8.8*  8.5*  8.0*   HCT  25.4*  22.7*  27.5*  27.9*  27.0*  25.3*   PLT  308  265  219   --    --    --       Recent Labs      10/21/18   1427  10/21/18   2201  10/22/18   0843   NA  139  142  140   K  4.1  3.7  3.7   CL  103  110  107   CO2  24  19*  22   PHOS   --    --   2.7   BUN  5*  5*  5*   CREATININE  0.6  0.6  0.5*     Recent Labs      10/21/18   1427   AST  19   ALT  9*   BILIDIR  <0.2   BILITOT  0.3   ALKPHOS  74     Recent Labs      10/21/18   1427   INR  1.08     No results for input(s): CKTOTAL, CKMB, CKMBINDEX, TROPONINI in the last 72 hours. Consults:     IP CONSULT TO GI    Active Hospital Problems    Diagnosis Date Noted    Colitis [K52.9] 10/21/2018    Lower GI bleed [K92.2] 10/21/2018         ASSESSMENT AND PLAN      Lower GI bleed due to colitis:  EGD nl. Flex sigmoidoscopy showed left sided colitis with ulcerations. She will need follow up colonoscopy for right colon. Cont with IVF, cipro/flagyl  If diarrhea- check c dif and bacterial pathogens    Anemia:  Of iron deficiency and GI bleed  H&H every 8 hours, IV venofer    H/p Graves disease: Will need repeated TSH/T4 as outpatient.              DVT Prophylaxis: SCD  Diet: DIET FULL LIQUID;  Code Status: Full Code    PT/OT Juan Ramon Christy MD

## 2018-10-23 NOTE — PLAN OF CARE
Problem: Pain:  Goal: Pain level will decrease  Pain level will decrease   Outcome: Ongoing  Patient complains of pain of 6/10 on pain scale, medication given as ordered. Pt satisfied with pain management upon reassessment. Will continue to monitor. Problem: Falls - Risk of:  Goal: Will remain free from falls  Will remain free from falls   Outcome: Ongoing  Patient in bed with non-skid socks on, calls out appropriately. Call light within reach, bed in lowest position and wheels locked. Will continue to monitor.

## 2018-10-24 LAB
ALBUMIN SERPL-MCNC: 3.4 G/DL (ref 3.4–5)
ANION GAP SERPL CALCULATED.3IONS-SCNC: 8 MMOL/L (ref 3–16)
BASOPHILS ABSOLUTE: 0 K/UL (ref 0–0.2)
BASOPHILS RELATIVE PERCENT: 0.4 %
BUN BLDV-MCNC: 3 MG/DL (ref 7–20)
CALCIUM SERPL-MCNC: 7.8 MG/DL (ref 8.3–10.6)
CHLORIDE BLD-SCNC: 111 MMOL/L (ref 99–110)
CO2: 22 MMOL/L (ref 21–32)
CREAT SERPL-MCNC: 0.5 MG/DL (ref 0.6–1.1)
EOSINOPHILS ABSOLUTE: 0.1 K/UL (ref 0–0.6)
EOSINOPHILS RELATIVE PERCENT: 1 %
GFR AFRICAN AMERICAN: >60
GFR NON-AFRICAN AMERICAN: >60
GLUCOSE BLD-MCNC: 99 MG/DL (ref 70–99)
HCT VFR BLD CALC: 24 % (ref 36–48)
HCT VFR BLD CALC: 25.1 % (ref 36–48)
HEMOGLOBIN: 7.4 G/DL (ref 12–16)
HEMOGLOBIN: 7.9 G/DL (ref 12–16)
LYMPHOCYTES ABSOLUTE: 1.8 K/UL (ref 1–5.1)
LYMPHOCYTES RELATIVE PERCENT: 28.5 %
MCH RBC QN AUTO: 20.8 PG (ref 26–34)
MCHC RBC AUTO-ENTMCNC: 30.8 G/DL (ref 31–36)
MCV RBC AUTO: 67.3 FL (ref 80–100)
MONOCYTES ABSOLUTE: 0.5 K/UL (ref 0–1.3)
MONOCYTES RELATIVE PERCENT: 7.7 %
NEUTROPHILS ABSOLUTE: 4 K/UL (ref 1.7–7.7)
NEUTROPHILS RELATIVE PERCENT: 62.4 %
PDW BLD-RTO: 24.7 % (ref 12.4–15.4)
PHOSPHORUS: 1.9 MG/DL (ref 2.5–4.9)
PLATELET # BLD: 195 K/UL (ref 135–450)
PMV BLD AUTO: 8.4 FL (ref 5–10.5)
POTASSIUM SERPL-SCNC: 3.6 MMOL/L (ref 3.5–5.1)
RBC # BLD: 3.56 M/UL (ref 4–5.2)
SODIUM BLD-SCNC: 141 MMOL/L (ref 136–145)
WBC # BLD: 6.4 K/UL (ref 4–11)

## 2018-10-24 PROCEDURE — 85018 HEMOGLOBIN: CPT

## 2018-10-24 PROCEDURE — 6360000002 HC RX W HCPCS: Performed by: INTERNAL MEDICINE

## 2018-10-24 PROCEDURE — 96375 TX/PRO/DX INJ NEW DRUG ADDON: CPT

## 2018-10-24 PROCEDURE — 2580000003 HC RX 258: Performed by: INTERNAL MEDICINE

## 2018-10-24 PROCEDURE — 85014 HEMATOCRIT: CPT

## 2018-10-24 PROCEDURE — 6370000000 HC RX 637 (ALT 250 FOR IP): Performed by: INTERNAL MEDICINE

## 2018-10-24 PROCEDURE — G0378 HOSPITAL OBSERVATION PER HR: HCPCS

## 2018-10-24 PROCEDURE — 80069 RENAL FUNCTION PANEL: CPT

## 2018-10-24 PROCEDURE — 96366 THER/PROPH/DIAG IV INF ADDON: CPT

## 2018-10-24 PROCEDURE — 96367 TX/PROPH/DG ADDL SEQ IV INF: CPT

## 2018-10-24 PROCEDURE — 85025 COMPLETE CBC W/AUTO DIFF WBC: CPT

## 2018-10-24 PROCEDURE — 2500000003 HC RX 250 WO HCPCS: Performed by: STUDENT IN AN ORGANIZED HEALTH CARE EDUCATION/TRAINING PROGRAM

## 2018-10-24 PROCEDURE — 6360000002 HC RX W HCPCS: Performed by: FAMILY MEDICINE

## 2018-10-24 PROCEDURE — 6360000002 HC RX W HCPCS: Performed by: STUDENT IN AN ORGANIZED HEALTH CARE EDUCATION/TRAINING PROGRAM

## 2018-10-24 PROCEDURE — 2580000003 HC RX 258: Performed by: STUDENT IN AN ORGANIZED HEALTH CARE EDUCATION/TRAINING PROGRAM

## 2018-10-24 PROCEDURE — 94760 N-INVAS EAR/PLS OXIMETRY 1: CPT

## 2018-10-24 PROCEDURE — 1200000000 HC SEMI PRIVATE

## 2018-10-24 RX ORDER — HYDROCODONE BITARTRATE AND ACETAMINOPHEN 5; 325 MG/1; MG/1
1 TABLET ORAL EVERY 6 HOURS PRN
Status: DISCONTINUED | OUTPATIENT
Start: 2018-10-24 | End: 2018-10-25 | Stop reason: HOSPADM

## 2018-10-24 RX ORDER — FUROSEMIDE 10 MG/ML
20 INJECTION INTRAMUSCULAR; INTRAVENOUS ONCE
Status: COMPLETED | OUTPATIENT
Start: 2018-10-24 | End: 2018-10-24

## 2018-10-24 RX ORDER — ANALGESIC BALM 1.74; 4.06 G/29G; G/29G
OINTMENT TOPICAL PRN
Status: DISCONTINUED | OUTPATIENT
Start: 2018-10-24 | End: 2018-10-25 | Stop reason: HOSPADM

## 2018-10-24 RX ORDER — ACETAMINOPHEN 500 MG
1000 TABLET ORAL EVERY 8 HOURS PRN
Status: DISCONTINUED | OUTPATIENT
Start: 2018-10-24 | End: 2018-10-25 | Stop reason: HOSPADM

## 2018-10-24 RX ORDER — SIMETHICONE 80 MG
80 TABLET,CHEWABLE ORAL EVERY 6 HOURS PRN
Status: DISCONTINUED | OUTPATIENT
Start: 2018-10-24 | End: 2018-10-25 | Stop reason: HOSPADM

## 2018-10-24 RX ADMIN — SIMETHICONE 80 MG: 80 TABLET, CHEWABLE ORAL at 12:06

## 2018-10-24 RX ADMIN — IRON SUCROSE 200 MG: 20 INJECTION, SOLUTION INTRAVENOUS at 16:55

## 2018-10-24 RX ADMIN — POTASSIUM & SODIUM PHOSPHATES POWDER PACK 280-160-250 MG 250 MG: 280-160-250 PACK at 16:55

## 2018-10-24 RX ADMIN — FUROSEMIDE 20 MG: 10 INJECTION, SOLUTION INTRAMUSCULAR; INTRAVENOUS at 21:46

## 2018-10-24 RX ADMIN — POTASSIUM & SODIUM PHOSPHATES POWDER PACK 280-160-250 MG 250 MG: 280-160-250 PACK at 12:06

## 2018-10-24 RX ADMIN — Medication 10 ML: at 21:46

## 2018-10-24 RX ADMIN — SIMETHICONE 80 MG: 80 TABLET, CHEWABLE ORAL at 18:10

## 2018-10-24 RX ADMIN — CIPROFLOXACIN 400 MG: 2 INJECTION, SOLUTION INTRAVENOUS at 09:39

## 2018-10-24 RX ADMIN — METRONIDAZOLE 500 MG: 500 INJECTION, SOLUTION INTRAVENOUS at 03:19

## 2018-10-24 RX ADMIN — POTASSIUM & SODIUM PHOSPHATES POWDER PACK 280-160-250 MG 250 MG: 280-160-250 PACK at 21:46

## 2018-10-24 ASSESSMENT — PAIN SCALES - GENERAL
PAINLEVEL_OUTOF10: 0
PAINLEVEL_OUTOF10: 3
PAINLEVEL_OUTOF10: 8
PAINLEVEL_OUTOF10: 6
PAINLEVEL_OUTOF10: 0

## 2018-10-24 ASSESSMENT — PAIN DESCRIPTION - PAIN TYPE
TYPE: ACUTE PAIN

## 2018-10-24 ASSESSMENT — PAIN DESCRIPTION - LOCATION
LOCATION: OTHER (COMMENT)
LOCATION: NECK;ARM;BACK
LOCATION: ABDOMEN

## 2018-10-24 ASSESSMENT — PAIN DESCRIPTION - DESCRIPTORS: DESCRIPTORS: CRAMPING

## 2018-10-24 ASSESSMENT — PAIN DESCRIPTION - ORIENTATION: ORIENTATION: LEFT;LOWER

## 2018-10-24 NOTE — PROGRESS NOTES
Hospitalist Progress Note      PCP: [address] [facility]    Date of Admission: 10/21/2018    Chief Complaint on Admission: lower GI bleed    Pt Seen/Examined and Chart Reviewed. Admitting dx same    SUBJECTIVE/OBJECTIVE:   Feels somewhat better today. Less nausea. Still with abd pain, cramps and distention. No BM since colonoscopy. Not passing much gas. Allergies  Codeine and Diazepam    Medications      Scheduled Meds:   methocarbamol IVPB  1,000 mg Intravenous Q8H    potassium & sodium phosphates  1 packet Oral 4x Daily    nicotine  1 patch Transdermal Daily    iron sucrose  200 mg Intravenous Q24H    sodium chloride flush  10 mL Intravenous 2 times per day       Infusions:      PRN Meds:  simethicone, analgesic ointment, acetaminophen, HYDROcodone 5 mg - acetaminophen, promethazine, sodium chloride flush, ondansetron    Vitals    TEMPERATURE:  Current - Temp: 97.5 °F (36.4 °C); Max - Temp  Av.7 °F (37.1 °C)  Min: 97.5 °F (36.4 °C)  Max: 100.1 °F (37.8 °C)  RESPIRATIONS RANGE: Resp  Av.3  Min: 15  Max: 18  PULSE RANGE: Pulse  Av.5  Min: 60  Max: 72  BLOOD PRESSURE RANGE:  Systolic (70ZKM), LNP:183 , Min:98 , VYO:204   ; Diastolic (11KLH), DJN:56, Min:63, Max:71    PULSE OXIMETRY RANGE: SpO2  Av.8 %  Min: 95 %  Max: 98 %  24HR INTAKE/OUTPUT:      Intake/Output Summary (Last 24 hours) at 10/24/18 1042  Last data filed at 10/24/18 0324   Gross per 24 hour   Intake             1288 ml   Output              450 ml   Net              838 ml       Exam:      General appearance: No apparent distress, appears stated age and cooperative. Lungs: Clear to ascultation, bilaterally without Rales/Wheezes/Rhonchi with good respiratory effort.   Heart: Regular rate and rhythm with Normal S1/S2 without  murmurs, rubs or gallops, point of maximum impulse non-displaced  Abdomen: Soft, tender in right upper and lower quadrants, mildly distended without rigidity or guarding and diminished bowel sounds all four quadrants. Extremities: No clubbing, cyanosis, or edema bilaterally. Skin: Skin color, texture, turgor normal.   Neurologic: Alert and oriented X 3,   grossly non-focal.  Mental status: Alert, oriented, thought content appropriate. Data    Recent Labs      10/22/18   0843   10/23/18   1254  10/23/18   2034  10/24/18   0035  10/24/18   0521   WBC  5.3   --   5.7   --    --   6.4   HGB  8.6*   < >  7.6*  7.7*  7.9*  7.4*   HCT  27.5*   < >  24.6*  24.7*  25.1*  24.0*   PLT  219   --   207   --    --   195    < > = values in this interval not displayed. Recent Labs      10/21/18   2201  10/22/18   0843  10/24/18   0621   NA  142  140  141   K  3.7  3.7  3.6   CL  110  107  111*   CO2  19*  22  22   PHOS   --   2.7  1.9*   BUN  5*  5*  3*   CREATININE  0.6  0.5*  0.5*     Recent Labs      10/21/18   1427   AST  19   ALT  9*   BILIDIR  <0.2   BILITOT  0.3   ALKPHOS  74     Recent Labs      10/21/18   1427   INR  1.08     No results for input(s): CKTOTAL, CKMB, CKMBINDEX, TROPONINI in the last 72 hours. Consults:     IP CONSULT TO GI    Active Hospital Problems    Diagnosis Date Noted    Colitis [K52.9] 10/21/2018    Lower GI bleed [K92.2] 10/21/2018         ASSESSMENT AND PLAN      Lower GI bleed due to ischemic colitis:  EGD nl. Flex sigmoidoscopy showed left sided colitis with ulcerations, pathology was consistent with ischemic colitis. Diet is advanced, monitor. Stop IVF. Colon polyp:  Tubular adenoma 13 cm, removed. She will need follow up colonoscopy for right colon. She is aware. Anemia:  Of iron deficiency and GI bleed. Counts continue to drift down, although no signs of bleeding. She is positive 7 liters, likely dilutional.   Stopping fluids and recheck in am. Given IV venofer this admission. H/p Graves disease: Will need repeated TSH/T4 as outpatient.          DVT Prophylaxis: SCD  Diet: DIET GENERAL; Low Fiber  Code Status: Full Code    PT/OT Eval Status:encourage anbulation    Dispo - inpt, possibly d/c in am    Sherri French MD

## 2018-10-24 NOTE — PROGRESS NOTES
Pt very drowsy at beginning of shift believes it was the phenergan she received on day shift. Woke up around 0100 stating she feels much better. Ambulated in hallway without difficulty. Tolerating full liquids. No BMs. No complaints at this time.

## 2018-10-24 NOTE — CARE COORDINATION
Jennifer Hardwick is calling to lend assistance with DC planning needs she can be reached at [phone].

## 2018-10-25 VITALS
SYSTOLIC BLOOD PRESSURE: 111 MMHG | HEIGHT: 64 IN | OXYGEN SATURATION: 99 % | WEIGHT: 241.62 LBS | BODY MASS INDEX: 41.25 KG/M2 | HEART RATE: 68 BPM | DIASTOLIC BLOOD PRESSURE: 73 MMHG | RESPIRATION RATE: 16 BRPM | TEMPERATURE: 98 F

## 2018-10-25 PROBLEM — D50.9 IRON DEFICIENCY ANEMIA: Status: ACTIVE | Noted: 2018-10-25

## 2018-10-25 LAB
ALBUMIN SERPL-MCNC: 3.9 G/DL (ref 3.4–5)
ANION GAP SERPL CALCULATED.3IONS-SCNC: 12 MMOL/L (ref 3–16)
ANISOCYTOSIS: ABNORMAL
BASOPHILS ABSOLUTE: 0 K/UL (ref 0–0.2)
BASOPHILS RELATIVE PERCENT: 0 %
BLOOD BANK DISPENSE STATUS: NORMAL
BLOOD BANK PRODUCT CODE: NORMAL
BPU ID: NORMAL
BUN BLDV-MCNC: 3 MG/DL (ref 7–20)
CALCIUM SERPL-MCNC: 8.7 MG/DL (ref 8.3–10.6)
CHLORIDE BLD-SCNC: 105 MMOL/L (ref 99–110)
CO2: 24 MMOL/L (ref 21–32)
CREAT SERPL-MCNC: 0.5 MG/DL (ref 0.6–1.1)
DESCRIPTION BLOOD BANK: NORMAL
EOSINOPHILS ABSOLUTE: 0 K/UL (ref 0–0.6)
EOSINOPHILS RELATIVE PERCENT: 0 %
GFR AFRICAN AMERICAN: >60
GFR NON-AFRICAN AMERICAN: >60
GLUCOSE BLD-MCNC: 94 MG/DL (ref 70–99)
HCT VFR BLD CALC: 27.3 % (ref 36–48)
HEMOGLOBIN: 8.6 G/DL (ref 12–16)
HYPOCHROMIA: ABNORMAL
LYMPHOCYTES ABSOLUTE: 1 K/UL (ref 1–5.1)
LYMPHOCYTES RELATIVE PERCENT: 13 %
MCH RBC QN AUTO: 21.2 PG (ref 26–34)
MCHC RBC AUTO-ENTMCNC: 31.5 G/DL (ref 31–36)
MCV RBC AUTO: 67.2 FL (ref 80–100)
MONOCYTES ABSOLUTE: 0.8 K/UL (ref 0–1.3)
MONOCYTES RELATIVE PERCENT: 10 %
NEUTROPHILS ABSOLUTE: 5.9 K/UL (ref 1.7–7.7)
NEUTROPHILS RELATIVE PERCENT: 77 %
NUCLEATED RED BLOOD CELLS: 1 /100 WBC
PDW BLD-RTO: 25.3 % (ref 12.4–15.4)
PHOSPHORUS: 3 MG/DL (ref 2.5–4.9)
PLATELET # BLD: 224 K/UL (ref 135–450)
PMV BLD AUTO: 8.7 FL (ref 5–10.5)
POLYCHROMASIA: ABNORMAL
POTASSIUM SERPL-SCNC: 3.7 MMOL/L (ref 3.5–5.1)
RBC # BLD: 4.06 M/UL (ref 4–5.2)
SODIUM BLD-SCNC: 141 MMOL/L (ref 136–145)
STOMATOCYTES: ABNORMAL
WBC # BLD: 7.6 K/UL (ref 4–11)

## 2018-10-25 PROCEDURE — 96366 THER/PROPH/DIAG IV INF ADDON: CPT

## 2018-10-25 PROCEDURE — 80069 RENAL FUNCTION PANEL: CPT

## 2018-10-25 PROCEDURE — 2580000003 HC RX 258: Performed by: STUDENT IN AN ORGANIZED HEALTH CARE EDUCATION/TRAINING PROGRAM

## 2018-10-25 PROCEDURE — 6360000002 HC RX W HCPCS: Performed by: INTERNAL MEDICINE

## 2018-10-25 PROCEDURE — 94760 N-INVAS EAR/PLS OXIMETRY 1: CPT

## 2018-10-25 PROCEDURE — 96376 TX/PRO/DX INJ SAME DRUG ADON: CPT

## 2018-10-25 PROCEDURE — 6370000000 HC RX 637 (ALT 250 FOR IP): Performed by: INTERNAL MEDICINE

## 2018-10-25 PROCEDURE — 2580000003 HC RX 258: Performed by: INTERNAL MEDICINE

## 2018-10-25 PROCEDURE — 85025 COMPLETE CBC W/AUTO DIFF WBC: CPT

## 2018-10-25 PROCEDURE — G0378 HOSPITAL OBSERVATION PER HR: HCPCS

## 2018-10-25 RX ORDER — FUROSEMIDE 10 MG/ML
20 INJECTION INTRAMUSCULAR; INTRAVENOUS ONCE
Status: COMPLETED | OUTPATIENT
Start: 2018-10-25 | End: 2018-10-25

## 2018-10-25 RX ORDER — FLUCONAZOLE 150 MG/1
150 TABLET ORAL ONCE
Qty: 1 TABLET | Refills: 1 | Status: SHIPPED | OUTPATIENT
Start: 2018-10-25 | End: 2018-10-25

## 2018-10-25 RX ORDER — HYDROCODONE BITARTRATE AND ACETAMINOPHEN 5; 325 MG/1; MG/1
1 TABLET ORAL EVERY 8 HOURS PRN
Qty: 15 TABLET | Refills: 0 | Status: SHIPPED | OUTPATIENT
Start: 2018-10-25 | End: 2018-10-30

## 2018-10-25 RX ORDER — NICOTINE 21 MG/24HR
1 PATCH, TRANSDERMAL 24 HOURS TRANSDERMAL DAILY
Qty: 30 PATCH | Refills: 3 | Status: ON HOLD | OUTPATIENT
Start: 2018-10-26 | End: 2019-01-21 | Stop reason: CLARIF

## 2018-10-25 RX ORDER — ANALGESIC BALM 1.74; 4.06 G/29G; G/29G
OINTMENT TOPICAL
Qty: 1 TUBE | Refills: 1 | Status: SHIPPED | OUTPATIENT
Start: 2018-10-25 | End: 2018-11-19

## 2018-10-25 RX ORDER — ACETAMINOPHEN 500 MG
1000 TABLET ORAL EVERY 8 HOURS PRN
Qty: 120 TABLET | Refills: 1 | Status: ON HOLD | OUTPATIENT
Start: 2018-10-25 | End: 2019-01-14 | Stop reason: HOSPADM

## 2018-10-25 RX ORDER — SIMETHICONE 80 MG
80 TABLET,CHEWABLE ORAL EVERY 6 HOURS PRN
Qty: 60 TABLET | Refills: 1 | Status: ON HOLD | OUTPATIENT
Start: 2018-10-25 | End: 2019-01-14 | Stop reason: HOSPADM

## 2018-10-25 RX ADMIN — METHOCARBAMOL 1000 MG: 100 INJECTION, SOLUTION INTRAMUSCULAR; INTRAVENOUS at 00:29

## 2018-10-25 RX ADMIN — Medication 10 ML: at 08:57

## 2018-10-25 RX ADMIN — FUROSEMIDE 20 MG: 10 INJECTION, SOLUTION INTRAMUSCULAR; INTRAVENOUS at 10:54

## 2018-10-25 RX ADMIN — POTASSIUM & SODIUM PHOSPHATES POWDER PACK 280-160-250 MG 250 MG: 280-160-250 PACK at 10:09

## 2018-10-25 RX ADMIN — HYDROCODONE BITARTRATE AND ACETAMINOPHEN 1 TABLET: 5; 325 TABLET ORAL at 02:40

## 2018-10-25 ASSESSMENT — PAIN SCALES - GENERAL: PAINLEVEL_OUTOF10: 8

## 2018-10-25 NOTE — PROGRESS NOTES
Order to d/c central line. Triple lumen IJ removed per protocol with no complications, tip intact. Pt resting comfortably in bed. Will continue to monitor.

## 2018-10-25 NOTE — PLAN OF CARE
Problem: Pain:  Goal: Pain level will decrease  Pain level will decrease   Outcome: Ongoing  Pt c/o gas pain this shift. Notified MD. Al Player order for Simethicone PRN. Pt reported effective. Robaxin also ordered for muscular pain. Pt unsure if effective. Will cont to monitor. Problem: Bowel Function - Altered:  Goal: Bowel elimination is within specified parameters  Bowel elimination is within specified parameters   Outcome: Ongoing  Pt has not had BM this shift. Pt is passing gas. Tolerating diet and pt is eating slowly for comfort. Will cont to monitor.

## 2018-10-25 NOTE — PROGRESS NOTES
Pt c/o leg swelling with numbness, worse in the left leg. Spoke with Dr. Paula Pearl at nurses station. Received orders for one time dose of 20 mg lasix IV. Pt and mother made aware. Pt requested to be weighed, wt updated in flowsheet. Will continue to monitor.

## 2018-10-25 NOTE — DISCHARGE SUMMARY
Hospital Medicine Discharge Summary      Patient ID: Fátima Rice      Patient's PCP: Mikayla Okeefe [facility]    Admit Date: 10/21/2018     Discharge Date:  10/25/2018    Admitting Physician: Shashi Corado MD    Discharge Physician: Yudi Box MD     Discharge Diagnoses: Active Hospital Problems    Diagnosis Date Noted    Iron deficiency anemia [D50.9] 10/25/2018    Colitis [K52.9] 10/21/2018    Lower GI bleed [K92.2] 10/21/2018         The patient was seen and examined on day of discharge and this discharge summary is in conjunction with any daily progress note from day of discharge. Hospital Course:   Ms. Ariel Dey is a 51 yo female with pre-diabetes, Graves disease, vitamin D def, anxiety and chronic microcytic anemia who presented to [facility] from [facility] ED with BRBPR for < 24 hrs duration. Her hemoglobin went down to 6.7 and she was admitted to ICU. 2 U PRBC was transfused. She experienced intermittent hypotension as well, but was fluid responsive. GI was consulted. EGD normal. Limited colonoscopy showed ischemic colitis, which was proven by pathology. One polyp was seen and removed, pathology came back as tubular adenoma. Bleeding stopped and her blood counts and vitals stabilized. Iron levels were low and replaced with IV venofer while here. Diet was advanced and tolerated well. Discharged with GI follow up and repeat full colonoscopy. Referrals for new PCP, endocrinology and GYN were provided. Stopped atenolol as no issues with tachycardia and blood pressure low normal at baseline. Consults:     GI Dr. Ramirez Vargas      Disposition: Home    Discharged Condition: Stable    Code Status: Full Code    Activity: activity as tolerated    Diet: regular diet    Follow Up: Primary Care Physician in one week    Exam:     General appearance: No apparent distress, appears stated age and cooperative.   Lungs: Clear to ascultation, bilaterally without Rales/Wheezes/Rhonchi with good respiratory effort. Heart: Regular rate and rhythm with Normal S1/S2 without  murmurs, rubs or gallops, point of maximum impulse non-displaced  Abdomen: Soft, non-tender or non-distended without rigidity or guarding and positive bowel sounds all four quadrants. Extremities: No clubbing, cyanosis, or edema bilaterally. Skin: Skin color, texture, turgor normal.  No rashes or lesions. Neurologic: Alert and oriented X 3,  grossly non-focal.  Mental status: Alert, oriented, thought content appropriate      Labs: For convenience and continuity at follow-up the following most recent labs are provided:    CBC:   Lab Results   Component Value Date    WBC 7.6 10/25/2018    HGB 8.6 10/25/2018    HCT 27.3 10/25/2018     10/25/2018       RENAL:   Lab Results   Component Value Date     10/25/2018    K 3.7 10/25/2018    K 3.7 10/21/2018     10/25/2018    CO2 24 10/25/2018    BUN 3 10/25/2018    CREATININE 0.5 10/25/2018           Discharge Medications:   Current Discharge Medication List           Details   HYDROcodone-acetaminophen (NORCO) 5-325 MG per tablet Take 1 tablet by mouth every 8 hours as needed for Pain for up to 5 days. Iuka Paganini: 15 tablet, Refills: 0    Associated Diagnoses: Colitis      Menthol-Methyl Salicylate (ANALGESIC OINTMENT) OINT ointment Apply to affected painful joints and back  Qty: 1 Tube, Refills: 1      simethicone (MYLICON) 80 MG chewable tablet Take 1 tablet by mouth every 6 hours as needed for Flatulence  Qty: 60 tablet, Refills: 1      nicotine (NICODERM CQ) 14 MG/24HR Place 1 patch onto the skin daily  Qty: 30 patch, Refills: 3      ferrous sulfate dried (SLOW IRON) 160 (50 Fe) MG TBCR extended release tablet Take 160 mg by mouth daily  Qty: 30 tablet, Refills: 1      acetaminophen (APAP EXTRA STRENGTH) 500 MG tablet Take 2 tablets by mouth every 8 hours as needed for Pain  Qty: 120 tablet, Refills: 1      fluconazole (DIFLUCAN) 150 MG tablet Take 1 tablet by mouth once for 1 dose Can repeat another dose in 72 hrs if symptoms are persistent  Qty: 1 tablet, Refills: 1              Details   hydrOXYzine (VISTARIL) 25 MG capsule Take 1 capsule by mouth 3 times daily as needed for Itching  Qty: 15 capsule, Refills: 0      dicyclomine (BENTYL) 10 MG capsule Take 1 capsule by mouth 4 times daily (before meals and nightly)  Qty: 20 capsule, Refills: 0      ondansetron (ZOFRAN) 4 MG tablet Take 1 tablet by mouth every 8 hours as needed for Nausea or Vomiting  Qty: 12 tablet, Refills: 0                Time Spent on discharge is more than 30 minutes in the examination, evaluation, counseling and review of medications and discharge plan. Signed:  Deidra Shone, MD   10/25/2018      Thank you [address] [place] [address] for the opportunity to be involved in this patient's care. If you have any questions or concerns please feel free to contact me at [phone].

## 2018-10-25 NOTE — PLAN OF CARE
Problem: Pain:  Goal: Pain level will decrease  Pain level will decrease   Outcome: Ongoing  Patient does not complain of any pain at this time. Will continue to monitor. Problem: Falls - Risk of:  Goal: Will remain free from falls  Will remain free from falls   Outcome: Ongoing  Patient up and ambulating in room independently, calls out appropriately. Call light within reach, bed in lowest position and wheels locked. Will continue to monitor.

## 2018-10-25 NOTE — PROGRESS NOTES
Order to discharge received. Patient given prescriptions for home, side effects explained. Discharge instructions given and verbally understood. Patient left with all belongings for home around [phone].

## 2018-10-29 ENCOUNTER — OFFICE VISIT (OUTPATIENT)
Dept: ENDOCRINOLOGY | Age: 48
End: 2018-10-29
Payer: COMMERCIAL

## 2018-10-29 VITALS
TEMPERATURE: 98.5 F | HEART RATE: 71 BPM | WEIGHT: 235 LBS | SYSTOLIC BLOOD PRESSURE: 120 MMHG | BODY MASS INDEX: 37.77 KG/M2 | DIASTOLIC BLOOD PRESSURE: 78 MMHG | OXYGEN SATURATION: 99 % | HEIGHT: 66 IN | RESPIRATION RATE: 14 BRPM

## 2018-10-29 DIAGNOSIS — R00.2 PALPITATIONS: ICD-10-CM

## 2018-10-29 DIAGNOSIS — E05.00 GRAVES DISEASE: Primary | ICD-10-CM

## 2018-10-29 DIAGNOSIS — R73.03 PRE-DIABETES: ICD-10-CM

## 2018-10-29 PROCEDURE — 99203 OFFICE O/P NEW LOW 30 MIN: CPT | Performed by: INTERNAL MEDICINE

## 2018-10-29 RX ORDER — PROPRANOLOL HYDROCHLORIDE 10 MG/1
10 TABLET ORAL 2 TIMES DAILY
Qty: 60 TABLET | Refills: 3 | Status: ON HOLD | OUTPATIENT
Start: 2018-10-29 | End: 2019-01-21 | Stop reason: CLARIF

## 2018-10-29 NOTE — Clinical Note
Dear Dr. Amina Horton,  Thank you so much for involving me in the care of your patient. Please review the enclosed note.   Warm Regards, Karol Sinclair

## 2018-11-19 ENCOUNTER — OFFICE VISIT (OUTPATIENT)
Dept: GYNECOLOGY | Age: 48
End: 2018-11-19
Payer: COMMERCIAL

## 2018-11-19 VITALS
SYSTOLIC BLOOD PRESSURE: 105 MMHG | DIASTOLIC BLOOD PRESSURE: 72 MMHG | HEIGHT: 64 IN | BODY MASS INDEX: 37.22 KG/M2 | HEART RATE: 89 BPM | WEIGHT: 218 LBS

## 2018-11-19 DIAGNOSIS — D21.9 FIBROIDS: ICD-10-CM

## 2018-11-19 DIAGNOSIS — Z01.419 WELL WOMAN EXAM WITH ROUTINE GYNECOLOGICAL EXAM: Primary | ICD-10-CM

## 2018-11-19 DIAGNOSIS — N92.1 MENOMETRORRHAGIA: ICD-10-CM

## 2018-11-19 DIAGNOSIS — D50.0 IRON DEFICIENCY ANEMIA DUE TO CHRONIC BLOOD LOSS: ICD-10-CM

## 2018-11-19 DIAGNOSIS — Z12.39 SCREENING FOR BREAST CANCER: ICD-10-CM

## 2018-11-19 PROCEDURE — G0101 CA SCREEN;PELVIC/BREAST EXAM: HCPCS | Performed by: OBSTETRICS & GYNECOLOGY

## 2018-11-19 ASSESSMENT — ENCOUNTER SYMPTOMS
NAUSEA: 0
ABDOMINAL PAIN: 0
TROUBLE SWALLOWING: 0
BLOOD IN STOOL: 0
ANAL BLEEDING: 0
PHOTOPHOBIA: 0
CONSTIPATION: 0
WHEEZING: 0
SHORTNESS OF BREATH: 0
RECTAL PAIN: 1
SORE THROAT: 0
COUGH: 0
VOMITING: 0
COLOR CHANGE: 0
APNEA: 0
CHEST TIGHTNESS: 0
BACK PAIN: 0
ABDOMINAL DISTENTION: 0
DIARRHEA: 0

## 2018-11-19 NOTE — PROGRESS NOTES
injury on the right labia. There is no rash, tenderness, lesion or injury on the left labia. Uterus is enlarged (14-16 weeks size uterus ). Uterus is not deviated, not fixed and not tender. Cervix exhibits no motion tenderness, no discharge and no friability. Right adnexum displays no mass, no tenderness and no fullness. Left adnexum displays no mass, no tenderness and no fullness. No erythema or tenderness in the vagina. No foreign body in the vagina. No signs of injury around the vagina. No vaginal discharge found. Neurological: She is alert. Skin: Skin is warm. No rash noted. No erythema. No pallor. Psychiatric: She has a normal mood and affect. Her behavior is normal. Judgment and thought content normal.         Preventive Care and Risk Factor Assessment:  Hx abnormal PAP:no  Self-breast exams: Yes  FH of breast cancer: yes - maternal aunt diagnosed in her 62s   FH of GYN cancer: no  Hx of abnormal mammogram: No  Colonoscopy: Yes  Previous DEXA scan: N/A    Assessment/Plan:  1. Well woman exam with routine gynecological exam  - PAP SMEAR  Self breast exam discussed and encouraged  Diet and exercise discussed  Discussed daily multi-vitamin and adequate calcium and vitamin D in diet  Safe sex and usage of condoms discussed   BCM- abstinence   SMOKER - CESSATION DISCUSSED AND RECOMMENDED     2. Screening for breast cancer  - Mammography Screening    3. Fibroids  Discussed all treatment options for fibroids and menometrorrhagia - final decision after her ultrasound   - US Pelvis Complete; Future  - CBC; Future    4. Menometrorrhagia  EMB next visit   - US Pelvis Complete; Future  - CBC; Future    5. Iron deficiency anemia due to chronic blood loss  - CBC; Future      Return in about 2 weeks (around 12/3/2018) for FOLLOW-UP - emb, treatment for fibroids .

## 2018-11-20 LAB
HPV COMMENT: ABNORMAL
HPV TYPE 16: NOT DETECTED
HPV TYPE 18: NOT DETECTED
HPVOH (OTHER TYPES): DETECTED

## 2018-11-29 ENCOUNTER — HOSPITAL ENCOUNTER (OUTPATIENT)
Dept: ULTRASOUND IMAGING | Age: 48
Discharge: HOME | End: 2018-11-29
Payer: COMMERCIAL

## 2018-11-29 ENCOUNTER — HOSPITAL ENCOUNTER (OUTPATIENT)
Dept: MAMMOGRAPHY | Age: 48
Discharge: HOME | End: 2018-11-29
Payer: COMMERCIAL

## 2018-11-29 DIAGNOSIS — N92.1 MENOMETRORRHAGIA: ICD-10-CM

## 2018-11-29 DIAGNOSIS — D21.9 FIBROIDS: ICD-10-CM

## 2018-11-29 DIAGNOSIS — Z12.31 VISIT FOR SCREENING MAMMOGRAM: ICD-10-CM

## 2018-11-29 DIAGNOSIS — D50.0 IRON DEFICIENCY ANEMIA DUE TO CHRONIC BLOOD LOSS: ICD-10-CM

## 2018-11-29 LAB
HCT VFR BLD CALC: 36 % (ref 36–48)
HEMOGLOBIN: 11.4 G/DL (ref 12–16)
MCH RBC QN AUTO: 23.9 PG (ref 26–34)
MCHC RBC AUTO-ENTMCNC: 31.5 G/DL (ref 31–36)
MCV RBC AUTO: 75.8 FL (ref 80–100)
PDW BLD-RTO: 30.6 % (ref 12.4–15.4)
PLATELET # BLD: 226 K/UL (ref 135–450)
PMV BLD AUTO: 9.3 FL (ref 5–10.5)
RBC # BLD: 4.75 M/UL (ref 4–5.2)
WBC # BLD: 5.9 K/UL (ref 4–11)

## 2018-11-29 PROCEDURE — 77063 BREAST TOMOSYNTHESIS BI: CPT

## 2018-11-29 PROCEDURE — 76856 US EXAM PELVIC COMPLETE: CPT

## 2018-11-29 PROCEDURE — 76830 TRANSVAGINAL US NON-OB: CPT

## 2018-11-30 ENCOUNTER — TELEPHONE (OUTPATIENT)
Dept: GYNECOLOGY | Age: 48
End: 2018-11-30

## 2018-12-03 ENCOUNTER — OFFICE VISIT (OUTPATIENT)
Dept: GYNECOLOGY | Age: 48
End: 2018-12-03
Payer: COMMERCIAL

## 2018-12-03 VITALS
HEIGHT: 64 IN | DIASTOLIC BLOOD PRESSURE: 71 MMHG | WEIGHT: 218 LBS | HEART RATE: 79 BPM | SYSTOLIC BLOOD PRESSURE: 106 MMHG | BODY MASS INDEX: 37.22 KG/M2

## 2018-12-03 DIAGNOSIS — B37.31 CANDIDA VAGINITIS: ICD-10-CM

## 2018-12-03 DIAGNOSIS — N92.1 MENORRHAGIA WITH IRREGULAR CYCLE: Primary | ICD-10-CM

## 2018-12-03 DIAGNOSIS — D21.9 FIBROIDS: ICD-10-CM

## 2018-12-03 DIAGNOSIS — D50.0 IRON DEFICIENCY ANEMIA DUE TO CHRONIC BLOOD LOSS: ICD-10-CM

## 2018-12-03 PROCEDURE — 58100 BIOPSY OF UTERUS LINING: CPT | Performed by: OBSTETRICS & GYNECOLOGY

## 2018-12-03 PROCEDURE — 99214 OFFICE O/P EST MOD 30 MIN: CPT | Performed by: OBSTETRICS & GYNECOLOGY

## 2018-12-03 RX ORDER — FLUCONAZOLE 150 MG/1
TABLET ORAL
Qty: 2 TABLET | Refills: 0 | Status: ON HOLD | OUTPATIENT
Start: 2018-12-03 | End: 2019-01-14 | Stop reason: HOSPADM

## 2018-12-03 ASSESSMENT — ENCOUNTER SYMPTOMS
PHOTOPHOBIA: 0
SORE THROAT: 0
ANAL BLEEDING: 0
VOMITING: 0
APNEA: 0
WHEEZING: 0
BLOOD IN STOOL: 0
SHORTNESS OF BREATH: 0
RECTAL PAIN: 0
NAUSEA: 0
CHEST TIGHTNESS: 0
DIARRHEA: 0
CONSTIPATION: 0
TROUBLE SWALLOWING: 0
COLOR CHANGE: 0
ABDOMINAL PAIN: 0
BACK PAIN: 0
COUGH: 0
ABDOMINAL DISTENTION: 0

## 2018-12-03 NOTE — PROGRESS NOTES
Theresa Motta  YOB: 1970    Date of Service:  12/3/2018    Chief Complaint:   Theresa Motta is a 50 y.o.  female who presents for heavy menses, anemia, fibroids and vaginal discharge     HPI:  Patient is premenopausal- Patient's last menstrual period was 2018. Naomi Hung She was initially seen in the office 2018 for a well woman exam and heavy menses and at that visit stated ->  Reports history of heavy menses for past 1 1/2 year. Cycles are q 14 days and last 9 days and very heavy for the first 4 days, she wears depends/super pads for her menses, reports mild to moderate dysmenorrhea. Not sexually active for the past 8 years. Patient was admitted to [facility] end of October for rectal bleeding and severe anemia and had colonoscopy and EGD, she had blood transfusion at that time. LAST H/H 8.6/27.3 on 10/25/2018. She has a CT scan of abdomen and pelvis on 10/21/2018 ->   FINDINGS:       The visualized lung bases are clear. The heart size is normal without pericardial effusion.       The liver enhances homogenously. No focal intrahepatic lesions are seen. The gallbladder is normal without evidence of gallstones or gallbladder wall thickening. There is no intrahepatic or extrahepatic biliary ductal dilatation. The spleen enhances    homogenously. The pancreas is normal. The right adrenal gland appears normal. A 1.5 cm left adrenal adenoma is unchanged. The kidneys enhance symmetrically bilaterally. There is no hydronephrosis or hydroureter.       Diastasis recti is noted. The stomach is normal. The small bowel is normal in course and caliber. There is no evidence of bowel obstruction. The appendix appears normal. There is very mild short segment circumferential wall thickening involving the    proximal sigmoid colon with minimal surrounding stranding. No diverticuli are seen. No lymphadenopathy is seen. The abdominal aorta is normal in course and caliber.  The remaining enhanced suicidal ideas. The patient is not nervous/anxious and is not hyperactive. Physical Exam:  /71   Pulse 79   Ht 5' 4\" (1.626 m)   Wt 218 lb (98.9 kg)   LMP 11/09/2018   BMI 37.42 kg/m²   Body mass index is 37.42 kg/m². Physical Exam   Constitutional: She is oriented to person, place, and time. She appears well-developed and well-nourished. HENT:   Head: Normocephalic and atraumatic. Neck: Normal range of motion. Neck supple. Cardiovascular: Normal rate. Pulmonary/Chest: No respiratory distress. Abdominal: Soft. Bowel sounds are normal. She exhibits no distension. There is no rebound and no guarding. Genitourinary: There is no rash, tenderness or lesion on the right labia. There is no rash, tenderness or lesion on the left labia. Uterus is enlarged (16 weeks size with multiple fibroids ). Cervix exhibits no motion tenderness and no discharge. Right adnexum displays no mass and no tenderness. Left adnexum displays no mass and no tenderness. Vaginal discharge (thick white discharge present ) found. Neurological: She is alert and oriented to person, place, and time. Skin: Skin is warm. Psychiatric: She has a normal mood and affect. Her behavior is normal.         Procedure note: endometrial biopsy  Indications for procedure reviewed. Risks and benefits of procedure discussed. Written and informed consent reviewed and signed. Urine pregnancy test declined by patient since she has not been sexually active for many years. Patient was positioned in the dorsal lithotomy position. The speculum was then placed vaginally and the cervix was prepped with betadine x 3 . A single tooth tenaculum was then applied to the anterior lip of the cervix. A endometrial pipelle was then inserted into the patients uterus for 2 passes. Cell/specimen collection was found to be adequate. The single tooth tenaculum was then removed from the anterior lip of the cervix and hemostasis was assured.  The coordination of care with over 50% in direct face to face counseling.

## 2018-12-07 ENCOUNTER — TELEPHONE (OUTPATIENT)
Dept: GYNECOLOGY | Age: 48
End: 2018-12-07

## 2019-01-10 ENCOUNTER — ANESTHESIA EVENT (OUTPATIENT)
Dept: OPERATING ROOM | Age: 49
DRG: 742 | End: 2019-01-10
Payer: COMMERCIAL

## 2019-01-11 ENCOUNTER — ANESTHESIA (OUTPATIENT)
Dept: OPERATING ROOM | Age: 49
DRG: 742 | End: 2019-01-11
Payer: COMMERCIAL

## 2019-01-11 ENCOUNTER — HOSPITAL ENCOUNTER (INPATIENT)
Age: 49
LOS: 3 days | Discharge: HOME | DRG: 742 | End: 2019-01-14
Attending: OBSTETRICS & GYNECOLOGY | Admitting: OBSTETRICS & GYNECOLOGY
Payer: COMMERCIAL

## 2019-01-11 VITALS — OXYGEN SATURATION: 97 % | DIASTOLIC BLOOD PRESSURE: 56 MMHG | SYSTOLIC BLOOD PRESSURE: 105 MMHG | TEMPERATURE: 95.7 F

## 2019-01-11 DIAGNOSIS — Z90.710 S/P HYSTERECTOMY: Primary | ICD-10-CM

## 2019-01-11 LAB
ABO/RH: NORMAL
ANTIBODY SCREEN: NORMAL
GLUCOSE BLD-MCNC: 104 MG/DL (ref 70–99)
HCT VFR BLD CALC: 25.8 % (ref 36–48)
HCT VFR BLD CALC: 26.4 % (ref 36–48)
HCT VFR BLD CALC: 29.4 % (ref 36–48)
HEMOGLOBIN: 8.2 G/DL (ref 12–16)
HEMOGLOBIN: 8.3 G/DL (ref 12–16)
HEMOGLOBIN: 9.5 G/DL (ref 12–16)
MCH RBC QN AUTO: 23.6 PG (ref 26–34)
MCHC RBC AUTO-ENTMCNC: 32.4 G/DL (ref 31–36)
MCV RBC AUTO: 73 FL (ref 80–100)
PDW BLD-RTO: 22.5 % (ref 12.4–15.4)
PERFORMED ON: ABNORMAL
PLATELET # BLD: 352 K/UL (ref 135–450)
PMV BLD AUTO: 7 FL (ref 5–10.5)
PREGNANCY, URINE: NEGATIVE
RBC # BLD: 4.03 M/UL (ref 4–5.2)
WBC # BLD: 6.3 K/UL (ref 4–11)

## 2019-01-11 PROCEDURE — 2580000003 HC RX 258: Performed by: ANESTHESIOLOGY

## 2019-01-11 PROCEDURE — 86900 BLOOD TYPING SEROLOGIC ABO: CPT

## 2019-01-11 PROCEDURE — 3600000014 HC SURGERY LEVEL 4 ADDTL 15MIN: Performed by: OBSTETRICS & GYNECOLOGY

## 2019-01-11 PROCEDURE — 6360000002 HC RX W HCPCS: Performed by: NURSE ANESTHETIST, CERTIFIED REGISTERED

## 2019-01-11 PROCEDURE — 3600000004 HC SURGERY LEVEL 4 BASE: Performed by: OBSTETRICS & GYNECOLOGY

## 2019-01-11 PROCEDURE — 86850 RBC ANTIBODY SCREEN: CPT

## 2019-01-11 PROCEDURE — 0DNU0ZZ RELEASE OMENTUM, OPEN APPROACH: ICD-10-PCS | Performed by: OBSTETRICS & GYNECOLOGY

## 2019-01-11 PROCEDURE — 88302 TISSUE EXAM BY PATHOLOGIST: CPT

## 2019-01-11 PROCEDURE — 6360000002 HC RX W HCPCS: Performed by: ANESTHESIOLOGY

## 2019-01-11 PROCEDURE — 88307 TISSUE EXAM BY PATHOLOGIST: CPT

## 2019-01-11 PROCEDURE — 85027 COMPLETE CBC AUTOMATED: CPT

## 2019-01-11 PROCEDURE — 2580000003 HC RX 258: Performed by: OBSTETRICS & GYNECOLOGY

## 2019-01-11 PROCEDURE — 6360000002 HC RX W HCPCS: Performed by: OBSTETRICS & GYNECOLOGY

## 2019-01-11 PROCEDURE — P9045 ALBUMIN (HUMAN), 5%, 250 ML: HCPCS | Performed by: NURSE ANESTHETIST, CERTIFIED REGISTERED

## 2019-01-11 PROCEDURE — 2580000003 HC RX 258: Performed by: NURSE ANESTHETIST, CERTIFIED REGISTERED

## 2019-01-11 PROCEDURE — 85018 HEMOGLOBIN: CPT

## 2019-01-11 PROCEDURE — 94760 N-INVAS EAR/PLS OXIMETRY 1: CPT

## 2019-01-11 PROCEDURE — 0UT90ZL RESECTION OF UTERUS, SUPRACERVICAL, OPEN APPROACH: ICD-10-PCS | Performed by: OBSTETRICS & GYNECOLOGY

## 2019-01-11 PROCEDURE — 94770 HC ETCO2 MONITOR DAILY: CPT

## 2019-01-11 PROCEDURE — L0625 LO FLEX L1-BELOW L5 PRE OTS: HCPCS | Performed by: OBSTETRICS & GYNECOLOGY

## 2019-01-11 PROCEDURE — 2500000003 HC RX 250 WO HCPCS: Performed by: OBSTETRICS & GYNECOLOGY

## 2019-01-11 PROCEDURE — 3700000000 HC ANESTHESIA ATTENDED CARE: Performed by: OBSTETRICS & GYNECOLOGY

## 2019-01-11 PROCEDURE — 36415 COLL VENOUS BLD VENIPUNCTURE: CPT

## 2019-01-11 PROCEDURE — 1200000000 HC SEMI PRIVATE

## 2019-01-11 PROCEDURE — 3700000001 HC ADD 15 MINUTES (ANESTHESIA): Performed by: OBSTETRICS & GYNECOLOGY

## 2019-01-11 PROCEDURE — 2720000010 HC SURG SUPPLY STERILE: Performed by: OBSTETRICS & GYNECOLOGY

## 2019-01-11 PROCEDURE — 2709999900 HC NON-CHARGEABLE SUPPLY: Performed by: OBSTETRICS & GYNECOLOGY

## 2019-01-11 PROCEDURE — 0UT70ZZ RESECTION OF BILATERAL FALLOPIAN TUBES, OPEN APPROACH: ICD-10-PCS | Performed by: OBSTETRICS & GYNECOLOGY

## 2019-01-11 PROCEDURE — 2500000003 HC RX 250 WO HCPCS: Performed by: NURSE ANESTHETIST, CERTIFIED REGISTERED

## 2019-01-11 PROCEDURE — 85014 HEMATOCRIT: CPT

## 2019-01-11 PROCEDURE — 0DNW0ZZ RELEASE PERITONEUM, OPEN APPROACH: ICD-10-PCS | Performed by: OBSTETRICS & GYNECOLOGY

## 2019-01-11 PROCEDURE — 2700000000 HC OXYGEN THERAPY PER DAY

## 2019-01-11 PROCEDURE — 7100000000 HC PACU RECOVERY - FIRST 15 MIN: Performed by: OBSTETRICS & GYNECOLOGY

## 2019-01-11 PROCEDURE — 84703 CHORIONIC GONADOTROPIN ASSAY: CPT

## 2019-01-11 PROCEDURE — 7100000001 HC PACU RECOVERY - ADDTL 15 MIN: Performed by: OBSTETRICS & GYNECOLOGY

## 2019-01-11 PROCEDURE — 86901 BLOOD TYPING SEROLOGIC RH(D): CPT

## 2019-01-11 RX ORDER — METHYLENE BLUE 10 MG/ML
INJECTION INTRAVENOUS PRN
Status: DISCONTINUED | OUTPATIENT
Start: 2019-01-11 | End: 2019-01-11 | Stop reason: SDUPTHER

## 2019-01-11 RX ORDER — DEXAMETHASONE SODIUM PHOSPHATE 4 MG/ML
INJECTION, SOLUTION INTRA-ARTICULAR; INTRALESIONAL; INTRAMUSCULAR; INTRAVENOUS; SOFT TISSUE PRN
Status: DISCONTINUED | OUTPATIENT
Start: 2019-01-11 | End: 2019-01-11 | Stop reason: SDUPTHER

## 2019-01-11 RX ORDER — DOCUSATE SODIUM 100 MG/1
100 CAPSULE, LIQUID FILLED ORAL 2 TIMES DAILY
Status: DISCONTINUED | OUTPATIENT
Start: 2019-01-11 | End: 2019-01-14

## 2019-01-11 RX ORDER — LORAZEPAM 2 MG/ML
INJECTION INTRAMUSCULAR
Status: DISPENSED
Start: 2019-01-11 | End: 2019-01-12

## 2019-01-11 RX ORDER — LORAZEPAM 2 MG/ML
1 INJECTION INTRAMUSCULAR
Status: DISCONTINUED | OUTPATIENT
Start: 2019-01-11 | End: 2019-01-11 | Stop reason: HOSPADM

## 2019-01-11 RX ORDER — SODIUM CHLORIDE, SODIUM LACTATE, POTASSIUM CHLORIDE, CALCIUM CHLORIDE 600; 310; 30; 20 MG/100ML; MG/100ML; MG/100ML; MG/100ML
INJECTION, SOLUTION INTRAVENOUS CONTINUOUS
Status: DISCONTINUED | OUTPATIENT
Start: 2019-01-11 | End: 2019-01-13

## 2019-01-11 RX ORDER — ACETAMINOPHEN 10 MG/ML
1000 INJECTION, SOLUTION INTRAVENOUS ONCE
Status: COMPLETED | OUTPATIENT
Start: 2019-01-11 | End: 2019-01-11

## 2019-01-11 RX ORDER — FENTANYL CITRATE 50 UG/ML
INJECTION, SOLUTION INTRAMUSCULAR; INTRAVENOUS PRN
Status: DISCONTINUED | OUTPATIENT
Start: 2019-01-11 | End: 2019-01-11 | Stop reason: SDUPTHER

## 2019-01-11 RX ORDER — NALOXONE HYDROCHLORIDE 0.4 MG/ML
0.4 INJECTION, SOLUTION INTRAMUSCULAR; INTRAVENOUS; SUBCUTANEOUS PRN
Status: DISCONTINUED | OUTPATIENT
Start: 2019-01-11 | End: 2019-01-13

## 2019-01-11 RX ORDER — EPHEDRINE SULFATE 50 MG/ML
INJECTION INTRAVENOUS PRN
Status: DISCONTINUED | OUTPATIENT
Start: 2019-01-11 | End: 2019-01-11 | Stop reason: SDUPTHER

## 2019-01-11 RX ORDER — CEFAZOLIN SODIUM 2 G/50ML
2 SOLUTION INTRAVENOUS ONCE
Status: COMPLETED | OUTPATIENT
Start: 2019-01-11 | End: 2019-01-11

## 2019-01-11 RX ORDER — ACETAMINOPHEN 325 MG/1
650 TABLET ORAL EVERY 4 HOURS PRN
Status: DISCONTINUED | OUTPATIENT
Start: 2019-01-11 | End: 2019-01-14 | Stop reason: HOSPADM

## 2019-01-11 RX ORDER — MAGNESIUM HYDROXIDE 1200 MG/15ML
LIQUID ORAL CONTINUOUS PRN
Status: COMPLETED | OUTPATIENT
Start: 2019-01-11 | End: 2019-01-11

## 2019-01-11 RX ORDER — ONDANSETRON 2 MG/ML
INJECTION INTRAMUSCULAR; INTRAVENOUS PRN
Status: DISCONTINUED | OUTPATIENT
Start: 2019-01-11 | End: 2019-01-11 | Stop reason: SDUPTHER

## 2019-01-11 RX ORDER — HYDROMORPHONE HCL 110MG/55ML
PATIENT CONTROLLED ANALGESIA SYRINGE INTRAVENOUS PRN
Status: DISCONTINUED | OUTPATIENT
Start: 2019-01-11 | End: 2019-01-11 | Stop reason: SDUPTHER

## 2019-01-11 RX ORDER — ALBUMIN, HUMAN INJ 5% 5 %
SOLUTION INTRAVENOUS PRN
Status: DISCONTINUED | OUTPATIENT
Start: 2019-01-11 | End: 2019-01-11 | Stop reason: SDUPTHER

## 2019-01-11 RX ORDER — ROCURONIUM BROMIDE 10 MG/ML
INJECTION, SOLUTION INTRAVENOUS PRN
Status: DISCONTINUED | OUTPATIENT
Start: 2019-01-11 | End: 2019-01-11 | Stop reason: SDUPTHER

## 2019-01-11 RX ORDER — LIDOCAINE HYDROCHLORIDE 20 MG/ML
INJECTION, SOLUTION EPIDURAL; INFILTRATION; INTRACAUDAL; PERINEURAL PRN
Status: DISCONTINUED | OUTPATIENT
Start: 2019-01-11 | End: 2019-01-11 | Stop reason: SDUPTHER

## 2019-01-11 RX ORDER — MIDAZOLAM HYDROCHLORIDE 1 MG/ML
INJECTION INTRAMUSCULAR; INTRAVENOUS PRN
Status: DISCONTINUED | OUTPATIENT
Start: 2019-01-11 | End: 2019-01-11 | Stop reason: SDUPTHER

## 2019-01-11 RX ORDER — SODIUM CHLORIDE 9 MG/ML
INJECTION, SOLUTION INTRAVENOUS CONTINUOUS PRN
Status: DISCONTINUED | OUTPATIENT
Start: 2019-01-11 | End: 2019-01-11 | Stop reason: SDUPTHER

## 2019-01-11 RX ORDER — ONDANSETRON 2 MG/ML
4 INJECTION INTRAMUSCULAR; INTRAVENOUS EVERY 8 HOURS PRN
Status: DISCONTINUED | OUTPATIENT
Start: 2019-01-11 | End: 2019-01-14 | Stop reason: HOSPADM

## 2019-01-11 RX ORDER — KETOROLAC TROMETHAMINE 30 MG/ML
INJECTION, SOLUTION INTRAMUSCULAR; INTRAVENOUS PRN
Status: DISCONTINUED | OUTPATIENT
Start: 2019-01-11 | End: 2019-01-11 | Stop reason: SDUPTHER

## 2019-01-11 RX ADMIN — KETOROLAC TROMETHAMINE 30 MG: 30 INJECTION, SOLUTION INTRAMUSCULAR; INTRAVENOUS at 15:52

## 2019-01-11 RX ADMIN — ROCURONIUM BROMIDE 50 MG: 10 INJECTION, SOLUTION INTRAVENOUS at 12:15

## 2019-01-11 RX ADMIN — ACETAMINOPHEN 1000 MG: 10 INJECTION, SOLUTION INTRAVENOUS at 18:08

## 2019-01-11 RX ADMIN — ONDANSETRON 4 MG: 2 INJECTION INTRAMUSCULAR; INTRAVENOUS at 12:15

## 2019-01-11 RX ADMIN — LORAZEPAM 1 MG: 2 INJECTION, SOLUTION INTRAMUSCULAR; INTRAVENOUS at 17:46

## 2019-01-11 RX ADMIN — ROCURONIUM BROMIDE 20 MG: 10 INJECTION, SOLUTION INTRAVENOUS at 12:37

## 2019-01-11 RX ADMIN — FENTANYL CITRATE 50 MCG: 50 INJECTION INTRAMUSCULAR; INTRAVENOUS at 14:59

## 2019-01-11 RX ADMIN — SODIUM CHLORIDE, POTASSIUM CHLORIDE, SODIUM LACTATE AND CALCIUM CHLORIDE: 600; 310; 30; 20 INJECTION, SOLUTION INTRAVENOUS at 22:15

## 2019-01-11 RX ADMIN — HYDROMORPHONE HYDROCHLORIDE 1 MG: 2 INJECTION, SOLUTION INTRAMUSCULAR; INTRAVENOUS; SUBCUTANEOUS at 12:37

## 2019-01-11 RX ADMIN — DEXAMETHASONE SODIUM PHOSPHATE 8 MG: 4 INJECTION, SOLUTION INTRAMUSCULAR; INTRAVENOUS at 12:15

## 2019-01-11 RX ADMIN — METHYLENE BLUE 5 MG: 10 INJECTION INTRAVENOUS at 15:21

## 2019-01-11 RX ADMIN — ALBUMIN (HUMAN) 12.5 G: 12.5 SOLUTION INTRAVENOUS at 15:27

## 2019-01-11 RX ADMIN — CEFAZOLIN SODIUM 2 G: 2 SOLUTION INTRAVENOUS at 12:14

## 2019-01-11 RX ADMIN — SODIUM CHLORIDE, SODIUM LACTATE, POTASSIUM CHLORIDE, AND CALCIUM CHLORIDE: 600; 310; 30; 20 INJECTION, SOLUTION INTRAVENOUS at 10:52

## 2019-01-11 RX ADMIN — EPHEDRINE SULFATE 10 MG: 50 INJECTION INTRAVENOUS at 13:01

## 2019-01-11 RX ADMIN — SODIUM CHLORIDE, SODIUM LACTATE, POTASSIUM CHLORIDE, AND CALCIUM CHLORIDE: 600; 310; 30; 20 INJECTION, SOLUTION INTRAVENOUS at 12:37

## 2019-01-11 RX ADMIN — LIDOCAINE HYDROCHLORIDE 100 MG: 20 INJECTION, SOLUTION EPIDURAL; INFILTRATION; INTRACAUDAL; PERINEURAL at 15:18

## 2019-01-11 RX ADMIN — HYDROMORPHONE HYDROCHLORIDE 0.1 MG: 10 INJECTION INTRAMUSCULAR; INTRAVENOUS; SUBCUTANEOUS at 17:38

## 2019-01-11 RX ADMIN — SODIUM CHLORIDE: 900 INJECTION, SOLUTION INTRAVENOUS at 12:20

## 2019-01-11 RX ADMIN — FENTANYL CITRATE 200 MCG: 50 INJECTION INTRAMUSCULAR; INTRAVENOUS at 12:15

## 2019-01-11 RX ADMIN — ROCURONIUM BROMIDE 10 MG: 10 INJECTION, SOLUTION INTRAVENOUS at 15:16

## 2019-01-11 RX ADMIN — SODIUM CHLORIDE, POTASSIUM CHLORIDE, SODIUM LACTATE AND CALCIUM CHLORIDE: 600; 310; 30; 20 INJECTION, SOLUTION INTRAVENOUS at 18:00

## 2019-01-11 RX ADMIN — HYDROMORPHONE HYDROCHLORIDE 1 MG: 2 INJECTION, SOLUTION INTRAMUSCULAR; INTRAVENOUS; SUBCUTANEOUS at 12:40

## 2019-01-11 RX ADMIN — ROCURONIUM BROMIDE 15 MG: 10 INJECTION, SOLUTION INTRAVENOUS at 13:29

## 2019-01-11 RX ADMIN — SODIUM CHLORIDE, SODIUM LACTATE, POTASSIUM CHLORIDE, AND CALCIUM CHLORIDE: 600; 310; 30; 20 INJECTION, SOLUTION INTRAVENOUS at 15:54

## 2019-01-11 RX ADMIN — FENTANYL CITRATE 50 MCG: 50 INJECTION INTRAMUSCULAR; INTRAVENOUS at 15:16

## 2019-01-11 RX ADMIN — LIDOCAINE HYDROCHLORIDE 100 MG: 20 INJECTION, SOLUTION EPIDURAL; INFILTRATION; INTRACAUDAL; PERINEURAL at 12:15

## 2019-01-11 RX ADMIN — PHENYLEPHRINE HYDROCHLORIDE 80 MCG: 10 INJECTION, SOLUTION INTRAMUSCULAR; INTRAVENOUS; SUBCUTANEOUS at 12:58

## 2019-01-11 RX ADMIN — SODIUM CHLORIDE, SODIUM LACTATE, POTASSIUM CHLORIDE, AND CALCIUM CHLORIDE: 600; 310; 30; 20 INJECTION, SOLUTION INTRAVENOUS at 14:25

## 2019-01-11 RX ADMIN — SUGAMMADEX 200 MG: 100 INJECTION, SOLUTION INTRAVENOUS at 15:52

## 2019-01-11 RX ADMIN — ONDANSETRON 4 MG: 2 INJECTION INTRAMUSCULAR; INTRAVENOUS at 15:52

## 2019-01-11 RX ADMIN — ROCURONIUM BROMIDE 15 MG: 10 INJECTION, SOLUTION INTRAVENOUS at 14:11

## 2019-01-11 RX ADMIN — METHYLENE BLUE 5 MG: 10 INJECTION INTRAVENOUS at 14:59

## 2019-01-11 RX ADMIN — PHENYLEPHRINE HYDROCHLORIDE 40 MCG: 10 INJECTION, SOLUTION INTRAMUSCULAR; INTRAVENOUS; SUBCUTANEOUS at 13:01

## 2019-01-11 RX ADMIN — HYDROMORPHONE HYDROCHLORIDE 0.5 MG: 2 INJECTION, SOLUTION INTRAMUSCULAR; INTRAVENOUS; SUBCUTANEOUS at 14:54

## 2019-01-11 RX ADMIN — MIDAZOLAM HYDROCHLORIDE 2 MG: 1 INJECTION INTRAMUSCULAR; INTRAVENOUS at 12:09

## 2019-01-11 ASSESSMENT — PULMONARY FUNCTION TESTS
PIF_VALUE: 32
PIF_VALUE: 33
PIF_VALUE: 30
PIF_VALUE: 33
PIF_VALUE: 33
PIF_VALUE: 28
PIF_VALUE: 28
PIF_VALUE: 13
PIF_VALUE: 32
PIF_VALUE: 32
PIF_VALUE: 28
PIF_VALUE: 2
PIF_VALUE: 31
PIF_VALUE: 29
PIF_VALUE: 31
PIF_VALUE: 32
PIF_VALUE: 29
PIF_VALUE: 33
PIF_VALUE: 27
PIF_VALUE: 31
PIF_VALUE: 27
PIF_VALUE: 31
PIF_VALUE: 28
PIF_VALUE: 29
PIF_VALUE: 0
PIF_VALUE: 32
PIF_VALUE: 31
PIF_VALUE: 28
PIF_VALUE: 32
PIF_VALUE: 29
PIF_VALUE: 30
PIF_VALUE: 33
PIF_VALUE: 32
PIF_VALUE: 27
PIF_VALUE: 2
PIF_VALUE: 26
PIF_VALUE: 31
PIF_VALUE: 29
PIF_VALUE: 33
PIF_VALUE: 27
PIF_VALUE: 27
PIF_VALUE: 28
PIF_VALUE: 32
PIF_VALUE: 31
PIF_VALUE: 31
PIF_VALUE: 32
PIF_VALUE: 28
PIF_VALUE: 31
PIF_VALUE: 31
PIF_VALUE: 0
PIF_VALUE: 27
PIF_VALUE: 28
PIF_VALUE: 28
PIF_VALUE: 29
PIF_VALUE: 33
PIF_VALUE: 27
PIF_VALUE: 33
PIF_VALUE: 2
PIF_VALUE: 33
PIF_VALUE: 31
PIF_VALUE: 2
PIF_VALUE: 31
PIF_VALUE: 33
PIF_VALUE: 34
PIF_VALUE: 34
PIF_VALUE: 33
PIF_VALUE: 26
PIF_VALUE: 31
PIF_VALUE: 27
PIF_VALUE: 32
PIF_VALUE: 33
PIF_VALUE: 28
PIF_VALUE: 31
PIF_VALUE: 31
PIF_VALUE: 33
PIF_VALUE: 33
PIF_VALUE: 32
PIF_VALUE: 2
PIF_VALUE: 32
PIF_VALUE: 29
PIF_VALUE: 33
PIF_VALUE: 28
PIF_VALUE: 34
PIF_VALUE: 32
PIF_VALUE: 30
PIF_VALUE: 29
PIF_VALUE: 32
PIF_VALUE: 27
PIF_VALUE: 28
PIF_VALUE: 28
PIF_VALUE: 31
PIF_VALUE: 27
PIF_VALUE: 29
PIF_VALUE: 28
PIF_VALUE: 33
PIF_VALUE: 28
PIF_VALUE: 32
PIF_VALUE: 27
PIF_VALUE: 32
PIF_VALUE: 13
PIF_VALUE: 2
PIF_VALUE: 33
PIF_VALUE: 32
PIF_VALUE: 28
PIF_VALUE: 33
PIF_VALUE: 27
PIF_VALUE: 27
PIF_VALUE: 29
PIF_VALUE: 32
PIF_VALUE: 31
PIF_VALUE: 27
PIF_VALUE: 29
PIF_VALUE: 32
PIF_VALUE: 33
PIF_VALUE: 7
PIF_VALUE: 28
PIF_VALUE: 31
PIF_VALUE: 31
PIF_VALUE: 29
PIF_VALUE: 27
PIF_VALUE: 31
PIF_VALUE: 33
PIF_VALUE: 32
PIF_VALUE: 2
PIF_VALUE: 32
PIF_VALUE: 33
PIF_VALUE: 28
PIF_VALUE: 28
PIF_VALUE: 27
PIF_VALUE: 32
PIF_VALUE: 31
PIF_VALUE: 32
PIF_VALUE: 29
PIF_VALUE: 2
PIF_VALUE: 32
PIF_VALUE: 32
PIF_VALUE: 31
PIF_VALUE: 28
PIF_VALUE: 29
PIF_VALUE: 4
PIF_VALUE: 33
PIF_VALUE: 29
PIF_VALUE: 0
PIF_VALUE: 32
PIF_VALUE: 31
PIF_VALUE: 27
PIF_VALUE: 33
PIF_VALUE: 32
PIF_VALUE: 31
PIF_VALUE: 31
PIF_VALUE: 27
PIF_VALUE: 27
PIF_VALUE: 29
PIF_VALUE: 31
PIF_VALUE: 28
PIF_VALUE: 32
PIF_VALUE: 27
PIF_VALUE: 31
PIF_VALUE: 30
PIF_VALUE: 32
PIF_VALUE: 32
PIF_VALUE: 31
PIF_VALUE: 32
PIF_VALUE: 31
PIF_VALUE: 28
PIF_VALUE: 33
PIF_VALUE: 32
PIF_VALUE: 28
PIF_VALUE: 31
PIF_VALUE: 28
PIF_VALUE: 31
PIF_VALUE: 28
PIF_VALUE: 31
PIF_VALUE: 0
PIF_VALUE: 32
PIF_VALUE: 27
PIF_VALUE: 32
PIF_VALUE: 32
PIF_VALUE: 27
PIF_VALUE: 32
PIF_VALUE: 28
PIF_VALUE: 27
PIF_VALUE: 27
PIF_VALUE: 29
PIF_VALUE: 28
PIF_VALUE: 28
PIF_VALUE: 32
PIF_VALUE: 28
PIF_VALUE: 32
PIF_VALUE: 29
PIF_VALUE: 27
PIF_VALUE: 9
PIF_VALUE: 28
PIF_VALUE: 28
PIF_VALUE: 27
PIF_VALUE: 31
PIF_VALUE: 31
PIF_VALUE: 30
PIF_VALUE: 26
PIF_VALUE: 27
PIF_VALUE: 31
PIF_VALUE: 27
PIF_VALUE: 33
PIF_VALUE: 31
PIF_VALUE: 13
PIF_VALUE: 32
PIF_VALUE: 29
PIF_VALUE: 33
PIF_VALUE: 32
PIF_VALUE: 31
PIF_VALUE: 32
PIF_VALUE: 32
PIF_VALUE: 31
PIF_VALUE: 32
PIF_VALUE: 31
PIF_VALUE: 26
PIF_VALUE: 32
PIF_VALUE: 33
PIF_VALUE: 28
PIF_VALUE: 33
PIF_VALUE: 2
PIF_VALUE: 31
PIF_VALUE: 33
PIF_VALUE: 32
PIF_VALUE: 31
PIF_VALUE: 28
PIF_VALUE: 31
PIF_VALUE: 31
PIF_VALUE: 33
PIF_VALUE: 2

## 2019-01-11 ASSESSMENT — PAIN SCALES - GENERAL: PAINLEVEL_OUTOF10: 10

## 2019-01-11 ASSESSMENT — PAIN - FUNCTIONAL ASSESSMENT: PAIN_FUNCTIONAL_ASSESSMENT: 0-10

## 2019-01-12 PROBLEM — Z90.710 S/P HYSTERECTOMY: Status: ACTIVE | Noted: 2019-01-12

## 2019-01-12 LAB
ANION GAP SERPL CALCULATED.3IONS-SCNC: 12 MMOL/L (ref 3–16)
BASOPHILS ABSOLUTE: 0 K/UL (ref 0–0.2)
BASOPHILS RELATIVE PERCENT: 0.2 %
BUN BLDV-MCNC: 9 MG/DL (ref 7–20)
CALCIUM SERPL-MCNC: 8.4 MG/DL (ref 8.3–10.6)
CHLORIDE BLD-SCNC: 102 MMOL/L (ref 99–110)
CO2: 23 MMOL/L (ref 21–32)
CREAT SERPL-MCNC: 0.9 MG/DL (ref 0.6–1.1)
EOSINOPHILS ABSOLUTE: 0 K/UL (ref 0–0.6)
EOSINOPHILS RELATIVE PERCENT: 0 %
GFR AFRICAN AMERICAN: >60
GFR NON-AFRICAN AMERICAN: >60
GLUCOSE BLD-MCNC: 115 MG/DL (ref 70–99)
HCT VFR BLD CALC: 22.5 % (ref 36–48)
HEMOGLOBIN: 7.3 G/DL (ref 12–16)
LYMPHOCYTES ABSOLUTE: 1 K/UL (ref 1–5.1)
LYMPHOCYTES RELATIVE PERCENT: 9.8 %
MCH RBC QN AUTO: 23.8 PG (ref 26–34)
MCHC RBC AUTO-ENTMCNC: 32.4 G/DL (ref 31–36)
MCV RBC AUTO: 73.3 FL (ref 80–100)
MONOCYTES ABSOLUTE: 0.7 K/UL (ref 0–1.3)
MONOCYTES RELATIVE PERCENT: 6.9 %
NEUTROPHILS ABSOLUTE: 8.5 K/UL (ref 1.7–7.7)
NEUTROPHILS RELATIVE PERCENT: 83.1 %
PDW BLD-RTO: 22.3 % (ref 12.4–15.4)
PLATELET # BLD: 287 K/UL (ref 135–450)
PMV BLD AUTO: 7.4 FL (ref 5–10.5)
POTASSIUM SERPL-SCNC: 4.4 MMOL/L (ref 3.5–5.1)
RBC # BLD: 3.06 M/UL (ref 4–5.2)
SODIUM BLD-SCNC: 137 MMOL/L (ref 136–145)
WBC # BLD: 10.3 K/UL (ref 4–11)

## 2019-01-12 PROCEDURE — 36415 COLL VENOUS BLD VENIPUNCTURE: CPT

## 2019-01-12 PROCEDURE — 94761 N-INVAS EAR/PLS OXIMETRY MLT: CPT

## 2019-01-12 PROCEDURE — 6370000000 HC RX 637 (ALT 250 FOR IP): Performed by: OBSTETRICS & GYNECOLOGY

## 2019-01-12 PROCEDURE — 6360000002 HC RX W HCPCS: Performed by: OBSTETRICS & GYNECOLOGY

## 2019-01-12 PROCEDURE — 85025 COMPLETE CBC W/AUTO DIFF WBC: CPT

## 2019-01-12 PROCEDURE — 2580000003 HC RX 258: Performed by: OBSTETRICS & GYNECOLOGY

## 2019-01-12 PROCEDURE — 1200000000 HC SEMI PRIVATE

## 2019-01-12 PROCEDURE — 94770 HC ETCO2 MONITOR DAILY: CPT

## 2019-01-12 PROCEDURE — 80048 BASIC METABOLIC PNL TOTAL CA: CPT

## 2019-01-12 PROCEDURE — 2700000000 HC OXYGEN THERAPY PER DAY

## 2019-01-12 RX ORDER — SIMETHICONE 80 MG
80 TABLET,CHEWABLE ORAL 4 TIMES DAILY PRN
Status: DISCONTINUED | OUTPATIENT
Start: 2019-01-12 | End: 2019-01-14

## 2019-01-12 RX ORDER — KETOROLAC TROMETHAMINE 30 MG/ML
30 INJECTION, SOLUTION INTRAMUSCULAR; INTRAVENOUS EVERY 6 HOURS PRN
Status: DISCONTINUED | OUTPATIENT
Start: 2019-01-12 | End: 2019-01-13

## 2019-01-12 RX ORDER — KETOROLAC TROMETHAMINE 30 MG/ML
30 INJECTION, SOLUTION INTRAMUSCULAR; INTRAVENOUS ONCE
Status: COMPLETED | OUTPATIENT
Start: 2019-01-12 | End: 2019-01-12

## 2019-01-12 RX ADMIN — SODIUM CHLORIDE, POTASSIUM CHLORIDE, SODIUM LACTATE AND CALCIUM CHLORIDE: 600; 310; 30; 20 INJECTION, SOLUTION INTRAVENOUS at 05:44

## 2019-01-12 RX ADMIN — DOCUSATE SODIUM 100 MG: 100 CAPSULE, LIQUID FILLED ORAL at 20:57

## 2019-01-12 RX ADMIN — SIMETHICONE CHEW TAB 80 MG 80 MG: 80 TABLET ORAL at 10:24

## 2019-01-12 RX ADMIN — DOCUSATE SODIUM 100 MG: 100 CAPSULE, LIQUID FILLED ORAL at 08:18

## 2019-01-12 RX ADMIN — KETOROLAC TROMETHAMINE 30 MG: 30 INJECTION, SOLUTION INTRAMUSCULAR at 10:23

## 2019-01-12 RX ADMIN — SIMETHICONE CHEW TAB 80 MG 80 MG: 80 TABLET ORAL at 20:57

## 2019-01-12 ASSESSMENT — PAIN SCALES - GENERAL
PAINLEVEL_OUTOF10: 0
PAINLEVEL_OUTOF10: 7

## 2019-01-13 PROCEDURE — 6370000000 HC RX 637 (ALT 250 FOR IP): Performed by: OBSTETRICS & GYNECOLOGY

## 2019-01-13 PROCEDURE — 94770 HC ETCO2 MONITOR DAILY: CPT

## 2019-01-13 PROCEDURE — 6360000002 HC RX W HCPCS: Performed by: OBSTETRICS & GYNECOLOGY

## 2019-01-13 PROCEDURE — 1200000000 HC SEMI PRIVATE

## 2019-01-13 PROCEDURE — 94761 N-INVAS EAR/PLS OXIMETRY MLT: CPT

## 2019-01-13 PROCEDURE — 2700000000 HC OXYGEN THERAPY PER DAY

## 2019-01-13 RX ORDER — NICOTINE 21 MG/24HR
1 PATCH, TRANSDERMAL 24 HOURS TRANSDERMAL DAILY
Status: DISCONTINUED | OUTPATIENT
Start: 2019-01-13 | End: 2019-01-14 | Stop reason: HOSPADM

## 2019-01-13 RX ORDER — IBUPROFEN 200 MG
800 TABLET ORAL
Status: DISCONTINUED | OUTPATIENT
Start: 2019-01-13 | End: 2019-01-14 | Stop reason: HOSPADM

## 2019-01-13 RX ORDER — OXYCODONE HYDROCHLORIDE AND ACETAMINOPHEN 5; 325 MG/1; MG/1
1 TABLET ORAL EVERY 4 HOURS PRN
Status: DISCONTINUED | OUTPATIENT
Start: 2019-01-13 | End: 2019-01-14 | Stop reason: HOSPADM

## 2019-01-13 RX ADMIN — OXYCODONE AND ACETAMINOPHEN 1 TABLET: 5; 325 TABLET ORAL at 19:29

## 2019-01-13 RX ADMIN — IBUPROFEN 800 MG: 200 TABLET, FILM COATED ORAL at 11:12

## 2019-01-13 RX ADMIN — OXYCODONE AND ACETAMINOPHEN 1 TABLET: 5; 325 TABLET ORAL at 11:45

## 2019-01-13 RX ADMIN — KETOROLAC TROMETHAMINE 30 MG: 30 INJECTION, SOLUTION INTRAMUSCULAR at 02:01

## 2019-01-13 RX ADMIN — IBUPROFEN 800 MG: 200 TABLET, FILM COATED ORAL at 17:08

## 2019-01-13 RX ADMIN — SIMETHICONE CHEW TAB 80 MG 80 MG: 80 TABLET ORAL at 02:01

## 2019-01-13 RX ADMIN — DOCUSATE SODIUM 100 MG: 100 CAPSULE, LIQUID FILLED ORAL at 08:21

## 2019-01-13 RX ADMIN — SIMETHICONE CHEW TAB 80 MG 80 MG: 80 TABLET ORAL at 08:21

## 2019-01-13 ASSESSMENT — PAIN SCALES - GENERAL
PAINLEVEL_OUTOF10: 5
PAINLEVEL_OUTOF10: 7
PAINLEVEL_OUTOF10: 4
PAINLEVEL_OUTOF10: 8
PAINLEVEL_OUTOF10: 8
PAINLEVEL_OUTOF10: 6

## 2019-01-13 ASSESSMENT — PAIN - FUNCTIONAL ASSESSMENT: PAIN_FUNCTIONAL_ASSESSMENT: PREVENTS OR INTERFERES SOME ACTIVE ACTIVITIES AND ADLS

## 2019-01-13 ASSESSMENT — PAIN DESCRIPTION - LOCATION
LOCATION: ABDOMEN
LOCATION: ABDOMEN

## 2019-01-13 ASSESSMENT — PAIN DESCRIPTION - ONSET: ONSET: AWAKENED FROM SLEEP

## 2019-01-13 ASSESSMENT — PAIN DESCRIPTION - PAIN TYPE
TYPE: SURGICAL PAIN
TYPE: SURGICAL PAIN

## 2019-01-13 ASSESSMENT — PAIN DESCRIPTION - DESCRIPTORS: DESCRIPTORS: CRAMPING

## 2019-01-14 VITALS
DIASTOLIC BLOOD PRESSURE: 59 MMHG | BODY MASS INDEX: 38.58 KG/M2 | HEART RATE: 93 BPM | RESPIRATION RATE: 15 BRPM | OXYGEN SATURATION: 97 % | HEIGHT: 64 IN | WEIGHT: 226 LBS | TEMPERATURE: 98.7 F | SYSTOLIC BLOOD PRESSURE: 98 MMHG

## 2019-01-14 PROCEDURE — 58180 PARTIAL HYSTERECTOMY: CPT | Performed by: OBSTETRICS & GYNECOLOGY

## 2019-01-14 PROCEDURE — 2580000003 HC RX 258: Performed by: OBSTETRICS & GYNECOLOGY

## 2019-01-14 PROCEDURE — 6370000000 HC RX 637 (ALT 250 FOR IP): Performed by: OBSTETRICS & GYNECOLOGY

## 2019-01-14 PROCEDURE — 6360000002 HC RX W HCPCS: Performed by: OBSTETRICS & GYNECOLOGY

## 2019-01-14 RX ORDER — NICOTINE 21 MG/24HR
1 PATCH, TRANSDERMAL 24 HOURS TRANSDERMAL DAILY
Qty: 30 PATCH | Refills: 3 | Status: ON HOLD | OUTPATIENT
Start: 2019-01-15 | End: 2019-01-21 | Stop reason: CLARIF

## 2019-01-14 RX ORDER — IBUPROFEN 800 MG/1
800 TABLET ORAL
Qty: 40 TABLET | Refills: 0 | Status: ON HOLD | OUTPATIENT
Start: 2019-01-14 | End: 2019-01-21 | Stop reason: CLARIF

## 2019-01-14 RX ORDER — OXYCODONE HYDROCHLORIDE AND ACETAMINOPHEN 5; 325 MG/1; MG/1
1 TABLET ORAL EVERY 4 HOURS PRN
Qty: 30 TABLET | Refills: 0 | Status: SHIPPED | OUTPATIENT
Start: 2019-01-14 | End: 2019-01-17

## 2019-01-14 RX ADMIN — IBUPROFEN 800 MG: 200 TABLET, FILM COATED ORAL at 08:02

## 2019-01-14 RX ADMIN — SIMETHICONE CHEW TAB 80 MG 80 MG: 80 TABLET ORAL at 08:02

## 2019-01-14 RX ADMIN — IBUPROFEN 800 MG: 200 TABLET, FILM COATED ORAL at 12:14

## 2019-01-14 RX ADMIN — IRON SUCROSE 200 MG: 20 INJECTION, SOLUTION INTRAVENOUS at 09:26

## 2019-01-14 RX ADMIN — OXYCODONE AND ACETAMINOPHEN 1 TABLET: 5; 325 TABLET ORAL at 03:33

## 2019-01-14 ASSESSMENT — PAIN SCALES - GENERAL
PAINLEVEL_OUTOF10: 5
PAINLEVEL_OUTOF10: 6
PAINLEVEL_OUTOF10: 5

## 2019-01-17 DIAGNOSIS — R11.0 POSTOPERATIVE NAUSEA: Primary | ICD-10-CM

## 2019-01-17 DIAGNOSIS — Z98.890 POSTOPERATIVE NAUSEA: Primary | ICD-10-CM

## 2019-01-17 RX ORDER — ONDANSETRON 4 MG/1
4 TABLET, ORALLY DISINTEGRATING ORAL EVERY 8 HOURS PRN
Qty: 25 TABLET | Refills: 0 | Status: SHIPPED | OUTPATIENT
Start: 2019-01-17

## 2019-01-17 RX ORDER — FUROSEMIDE 20 MG/1
TABLET ORAL
Qty: 1 TABLET | Refills: 0 | Status: ON HOLD | OUTPATIENT
Start: 2019-01-17 | End: 2019-01-21 | Stop reason: CLARIF

## 2019-01-21 ENCOUNTER — HOSPITAL ENCOUNTER (INPATIENT)
Age: 49
LOS: 1 days | Discharge: HOME | DRG: 660 | End: 2019-01-22
Attending: EMERGENCY MEDICINE | Admitting: OBSTETRICS & GYNECOLOGY
Payer: COMMERCIAL

## 2019-01-21 ENCOUNTER — ANESTHESIA (OUTPATIENT)
Dept: OPERATING ROOM | Age: 49
DRG: 660 | End: 2019-01-21
Payer: COMMERCIAL

## 2019-01-21 ENCOUNTER — ANESTHESIA EVENT (OUTPATIENT)
Dept: OPERATING ROOM | Age: 49
DRG: 660 | End: 2019-01-21
Payer: COMMERCIAL

## 2019-01-21 ENCOUNTER — APPOINTMENT (OUTPATIENT)
Dept: CT IMAGING | Age: 49
DRG: 660 | End: 2019-01-21
Payer: COMMERCIAL

## 2019-01-21 ENCOUNTER — APPOINTMENT (OUTPATIENT)
Dept: GENERAL RADIOLOGY | Age: 49
DRG: 660 | End: 2019-01-21
Payer: COMMERCIAL

## 2019-01-21 VITALS
SYSTOLIC BLOOD PRESSURE: 85 MMHG | OXYGEN SATURATION: 100 % | RESPIRATION RATE: 7 BRPM | TEMPERATURE: 96.8 F | DIASTOLIC BLOOD PRESSURE: 6 MMHG

## 2019-01-21 DIAGNOSIS — Z90.710 S/P HYSTERECTOMY: ICD-10-CM

## 2019-01-21 DIAGNOSIS — S37.10XA LEFT URETERAL INJURY, INITIAL ENCOUNTER: Primary | ICD-10-CM

## 2019-01-21 PROBLEM — N73.9 PELVIC ABSCESS IN FEMALE: Status: ACTIVE | Noted: 2019-01-21

## 2019-01-21 LAB
A/G RATIO: 1 (ref 1.1–2.2)
ALBUMIN SERPL-MCNC: 3.6 G/DL (ref 3.4–5)
ALP BLD-CCNC: 83 U/L (ref 40–129)
ALT SERPL-CCNC: 16 U/L (ref 10–40)
ANION GAP SERPL CALCULATED.3IONS-SCNC: 11 MMOL/L (ref 3–16)
AST SERPL-CCNC: 15 U/L (ref 15–37)
BACTERIA: ABNORMAL /HPF
BASOPHILS ABSOLUTE: 0.1 K/UL (ref 0–0.2)
BASOPHILS RELATIVE PERCENT: 0.9 %
BILIRUB SERPL-MCNC: 0.3 MG/DL (ref 0–1)
BILIRUBIN URINE: NEGATIVE
BLOOD, URINE: ABNORMAL
BUN BLDV-MCNC: 6 MG/DL (ref 7–20)
CALCIUM SERPL-MCNC: 9.1 MG/DL (ref 8.3–10.6)
CHLORIDE BLD-SCNC: 104 MMOL/L (ref 99–110)
CLARITY: CLEAR
CO2: 24 MMOL/L (ref 21–32)
COLOR: YELLOW
CREAT SERPL-MCNC: 1 MG/DL (ref 0.6–1.1)
EOSINOPHILS ABSOLUTE: 0.1 K/UL (ref 0–0.6)
EOSINOPHILS RELATIVE PERCENT: 1.4 %
EPITHELIAL CELLS, UA: ABNORMAL /HPF
GFR AFRICAN AMERICAN: >60
GFR NON-AFRICAN AMERICAN: 59
GLOBULIN: 3.6 G/DL
GLUCOSE BLD-MCNC: 108 MG/DL (ref 70–99)
GLUCOSE BLD-MCNC: 84 MG/DL (ref 70–99)
GLUCOSE URINE: NEGATIVE MG/DL
HCT VFR BLD CALC: 26.8 % (ref 36–48)
HEMOGLOBIN: 8.4 G/DL (ref 12–16)
INR BLD: 1.02 (ref 0.86–1.14)
KETONES, URINE: NEGATIVE MG/DL
LACTIC ACID: 1.2 MMOL/L (ref 0.4–2)
LEUKOCYTE ESTERASE, URINE: NEGATIVE
LIPASE: 17 U/L (ref 13–60)
LYMPHOCYTES ABSOLUTE: 1.9 K/UL (ref 1–5.1)
LYMPHOCYTES RELATIVE PERCENT: 19.2 %
MCH RBC QN AUTO: 23.5 PG (ref 26–34)
MCHC RBC AUTO-ENTMCNC: 31.3 G/DL (ref 31–36)
MCV RBC AUTO: 75.2 FL (ref 80–100)
MICROSCOPIC EXAMINATION: YES
MONOCYTES ABSOLUTE: 0.8 K/UL (ref 0–1.3)
MONOCYTES RELATIVE PERCENT: 8.1 %
MUCUS: ABNORMAL /LPF
NEUTROPHILS ABSOLUTE: 7 K/UL (ref 1.7–7.7)
NEUTROPHILS RELATIVE PERCENT: 70.4 %
NITRITE, URINE: NEGATIVE
PDW BLD-RTO: 23 % (ref 12.4–15.4)
PERFORMED ON: NORMAL
PH UA: 6
PLATELET # BLD: 298 K/UL (ref 135–450)
PMV BLD AUTO: 7.5 FL (ref 5–10.5)
POTASSIUM SERPL-SCNC: 3.9 MMOL/L (ref 3.5–5.1)
PROTEIN UA: NEGATIVE MG/DL
PROTHROMBIN TIME: 11.6 SEC (ref 9.8–13)
RBC # BLD: 3.57 M/UL (ref 4–5.2)
RBC UA: ABNORMAL /HPF (ref 0–2)
SODIUM BLD-SCNC: 139 MMOL/L (ref 136–145)
SPECIFIC GRAVITY UA: 1.01
TOTAL PROTEIN: 7.2 G/DL (ref 6.4–8.2)
URINE TYPE: ABNORMAL
UROBILINOGEN, URINE: 0.2 E.U./DL
WBC # BLD: 9.9 K/UL (ref 4–11)
WBC UA: ABNORMAL /HPF (ref 0–5)

## 2019-01-21 PROCEDURE — 6360000002 HC RX W HCPCS: Performed by: EMERGENCY MEDICINE

## 2019-01-21 PROCEDURE — 2500000003 HC RX 250 WO HCPCS: Performed by: NURSE ANESTHETIST, CERTIFIED REGISTERED

## 2019-01-21 PROCEDURE — 96375 TX/PRO/DX INJ NEW DRUG ADDON: CPT

## 2019-01-21 PROCEDURE — 6360000002 HC RX W HCPCS: Performed by: NURSE ANESTHETIST, CERTIFIED REGISTERED

## 2019-01-21 PROCEDURE — G0378 HOSPITAL OBSERVATION PER HR: HCPCS

## 2019-01-21 PROCEDURE — 3700000001 HC ADD 15 MINUTES (ANESTHESIA): Performed by: UROLOGY

## 2019-01-21 PROCEDURE — C1758 CATHETER, URETERAL: HCPCS | Performed by: UROLOGY

## 2019-01-21 PROCEDURE — 2580000003 HC RX 258: Performed by: OBSTETRICS & GYNECOLOGY

## 2019-01-21 PROCEDURE — 7100000000 HC PACU RECOVERY - FIRST 15 MIN: Performed by: UROLOGY

## 2019-01-21 PROCEDURE — 72192 CT PELVIS W/O DYE: CPT

## 2019-01-21 PROCEDURE — 2580000003 HC RX 258: Performed by: UROLOGY

## 2019-01-21 PROCEDURE — 6360000002 HC RX W HCPCS: Performed by: OBSTETRICS & GYNECOLOGY

## 2019-01-21 PROCEDURE — 3600000004 HC SURGERY LEVEL 4 BASE: Performed by: UROLOGY

## 2019-01-21 PROCEDURE — 0T778DZ DILATION OF LEFT URETER WITH INTRALUMINAL DEVICE, VIA NATURAL OR ARTIFICIAL OPENING ENDOSCOPIC: ICD-10-PCS | Performed by: UROLOGY

## 2019-01-21 PROCEDURE — 7100000001 HC PACU RECOVERY - ADDTL 15 MIN: Performed by: UROLOGY

## 2019-01-21 PROCEDURE — BT141ZZ FLUOROSCOPY OF KIDNEYS, URETERS AND BLADDER USING LOW OSMOLAR CONTRAST: ICD-10-PCS | Performed by: UROLOGY

## 2019-01-21 PROCEDURE — 1200000000 HC SEMI PRIVATE

## 2019-01-21 PROCEDURE — 2709999900 HC NON-CHARGEABLE SUPPLY: Performed by: UROLOGY

## 2019-01-21 PROCEDURE — 74420 UROGRAPHY RTRGR +-KUB: CPT

## 2019-01-21 PROCEDURE — C2617 STENT, NON-COR, TEM W/O DEL: HCPCS | Performed by: UROLOGY

## 2019-01-21 PROCEDURE — 2580000003 HC RX 258: Performed by: EMERGENCY MEDICINE

## 2019-01-21 PROCEDURE — 85610 PROTHROMBIN TIME: CPT

## 2019-01-21 PROCEDURE — 83690 ASSAY OF LIPASE: CPT

## 2019-01-21 PROCEDURE — 3700000000 HC ANESTHESIA ATTENDED CARE: Performed by: UROLOGY

## 2019-01-21 PROCEDURE — 83605 ASSAY OF LACTIC ACID: CPT

## 2019-01-21 PROCEDURE — 96365 THER/PROPH/DIAG IV INF INIT: CPT

## 2019-01-21 PROCEDURE — 6360000004 HC RX CONTRAST MEDICATION: Performed by: EMERGENCY MEDICINE

## 2019-01-21 PROCEDURE — 74177 CT ABD & PELVIS W/CONTRAST: CPT

## 2019-01-21 PROCEDURE — 3600000014 HC SURGERY LEVEL 4 ADDTL 15MIN: Performed by: UROLOGY

## 2019-01-21 PROCEDURE — 80053 COMPREHEN METABOLIC PANEL: CPT

## 2019-01-21 PROCEDURE — 96361 HYDRATE IV INFUSION ADD-ON: CPT

## 2019-01-21 PROCEDURE — 99285 EMERGENCY DEPT VISIT HI MDM: CPT

## 2019-01-21 PROCEDURE — 85025 COMPLETE CBC W/AUTO DIFF WBC: CPT

## 2019-01-21 PROCEDURE — 2580000003 HC RX 258: Performed by: NURSE ANESTHETIST, CERTIFIED REGISTERED

## 2019-01-21 PROCEDURE — 6370000000 HC RX 637 (ALT 250 FOR IP): Performed by: OBSTETRICS & GYNECOLOGY

## 2019-01-21 PROCEDURE — C1769 GUIDE WIRE: HCPCS | Performed by: UROLOGY

## 2019-01-21 PROCEDURE — 81001 URINALYSIS AUTO W/SCOPE: CPT

## 2019-01-21 DEVICE — STENT URET 6FR L24CM PERCFLX HYDR+ DBL PGTL THRD 2: Type: IMPLANTABLE DEVICE | Site: URETER | Status: FUNCTIONAL

## 2019-01-21 RX ORDER — GLYCOPYRROLATE 0.2 MG/ML
INJECTION INTRAMUSCULAR; INTRAVENOUS PRN
Status: DISCONTINUED | OUTPATIENT
Start: 2019-01-21 | End: 2019-01-21 | Stop reason: SDUPTHER

## 2019-01-21 RX ORDER — MORPHINE SULFATE 2 MG/ML
2 INJECTION, SOLUTION INTRAMUSCULAR; INTRAVENOUS
Status: DISCONTINUED | OUTPATIENT
Start: 2019-01-21 | End: 2019-01-22 | Stop reason: HOSPADM

## 2019-01-21 RX ORDER — ONDANSETRON 2 MG/ML
4 INJECTION INTRAMUSCULAR; INTRAVENOUS ONCE
Status: COMPLETED | OUTPATIENT
Start: 2019-01-21 | End: 2019-01-21

## 2019-01-21 RX ORDER — MAGNESIUM HYDROXIDE 1200 MG/15ML
LIQUID ORAL PRN
Status: DISCONTINUED | OUTPATIENT
Start: 2019-01-21 | End: 2019-01-21 | Stop reason: HOSPADM

## 2019-01-21 RX ORDER — MEPERIDINE HYDROCHLORIDE 25 MG/ML
12.5 INJECTION INTRAMUSCULAR; INTRAVENOUS; SUBCUTANEOUS EVERY 5 MIN PRN
Status: DISCONTINUED | OUTPATIENT
Start: 2019-01-21 | End: 2019-01-21

## 2019-01-21 RX ORDER — PROPOFOL 10 MG/ML
INJECTION, EMULSION INTRAVENOUS PRN
Status: DISCONTINUED | OUTPATIENT
Start: 2019-01-21 | End: 2019-01-21 | Stop reason: SDUPTHER

## 2019-01-21 RX ORDER — FENTANYL CITRATE 50 UG/ML
25 INJECTION, SOLUTION INTRAMUSCULAR; INTRAVENOUS EVERY 5 MIN PRN
Status: DISCONTINUED | OUTPATIENT
Start: 2019-01-21 | End: 2019-01-21

## 2019-01-21 RX ORDER — KETOROLAC TROMETHAMINE 30 MG/ML
30 INJECTION, SOLUTION INTRAMUSCULAR; INTRAVENOUS ONCE
Status: COMPLETED | OUTPATIENT
Start: 2019-01-21 | End: 2019-01-21

## 2019-01-21 RX ORDER — PROPRANOLOL HYDROCHLORIDE 10 MG/1
10 TABLET ORAL 2 TIMES DAILY PRN
Status: DISCONTINUED | OUTPATIENT
Start: 2019-01-21 | End: 2019-01-22 | Stop reason: HOSPADM

## 2019-01-21 RX ORDER — LABETALOL HYDROCHLORIDE 5 MG/ML
5 INJECTION, SOLUTION INTRAVENOUS EVERY 10 MIN PRN
Status: DISCONTINUED | OUTPATIENT
Start: 2019-01-21 | End: 2019-01-21

## 2019-01-21 RX ORDER — SODIUM CHLORIDE 0.9 % (FLUSH) 0.9 %
10 SYRINGE (ML) INJECTION EVERY 12 HOURS SCHEDULED
Status: DISCONTINUED | OUTPATIENT
Start: 2019-01-21 | End: 2019-01-22 | Stop reason: HOSPADM

## 2019-01-21 RX ORDER — MIDAZOLAM HYDROCHLORIDE 1 MG/ML
INJECTION INTRAMUSCULAR; INTRAVENOUS PRN
Status: DISCONTINUED | OUTPATIENT
Start: 2019-01-21 | End: 2019-01-21 | Stop reason: SDUPTHER

## 2019-01-21 RX ORDER — 0.9 % SODIUM CHLORIDE 0.9 %
1000 INTRAVENOUS SOLUTION INTRAVENOUS ONCE
Status: COMPLETED | OUTPATIENT
Start: 2019-01-21 | End: 2019-01-21

## 2019-01-21 RX ORDER — PROPRANOLOL HYDROCHLORIDE 10 MG/1
10 TABLET ORAL 2 TIMES DAILY PRN
COMMUNITY

## 2019-01-21 RX ORDER — DIPHENHYDRAMINE HYDROCHLORIDE 50 MG/ML
12.5 INJECTION INTRAMUSCULAR; INTRAVENOUS
Status: DISCONTINUED | OUTPATIENT
Start: 2019-01-21 | End: 2019-01-21

## 2019-01-21 RX ORDER — FENTANYL CITRATE 50 UG/ML
INJECTION, SOLUTION INTRAMUSCULAR; INTRAVENOUS PRN
Status: DISCONTINUED | OUTPATIENT
Start: 2019-01-21 | End: 2019-01-21 | Stop reason: SDUPTHER

## 2019-01-21 RX ORDER — IBUPROFEN 600 MG/1
600 TABLET ORAL
COMMUNITY
End: 2019-08-18

## 2019-01-21 RX ORDER — HYDRALAZINE HYDROCHLORIDE 20 MG/ML
5 INJECTION INTRAMUSCULAR; INTRAVENOUS EVERY 10 MIN PRN
Status: DISCONTINUED | OUTPATIENT
Start: 2019-01-21 | End: 2019-01-21

## 2019-01-21 RX ORDER — ONDANSETRON 4 MG/1
4 TABLET, ORALLY DISINTEGRATING ORAL EVERY 8 HOURS PRN
Status: DISCONTINUED | OUTPATIENT
Start: 2019-01-21 | End: 2019-01-22 | Stop reason: HOSPADM

## 2019-01-21 RX ORDER — SODIUM CHLORIDE 9 MG/ML
INJECTION, SOLUTION INTRAVENOUS CONTINUOUS
Status: DISCONTINUED | OUTPATIENT
Start: 2019-01-21 | End: 2019-01-22 | Stop reason: HOSPADM

## 2019-01-21 RX ORDER — ONDANSETRON 2 MG/ML
4 INJECTION INTRAMUSCULAR; INTRAVENOUS EVERY 6 HOURS PRN
Status: DISCONTINUED | OUTPATIENT
Start: 2019-01-21 | End: 2019-01-22 | Stop reason: HOSPADM

## 2019-01-21 RX ORDER — OXYCODONE HYDROCHLORIDE AND ACETAMINOPHEN 5; 325 MG/1; MG/1
1 TABLET ORAL ONCE
Status: DISCONTINUED | OUTPATIENT
Start: 2019-01-21 | End: 2019-01-21

## 2019-01-21 RX ORDER — SIMETHICONE 80 MG
80 TABLET,CHEWABLE ORAL ONCE
Status: COMPLETED | OUTPATIENT
Start: 2019-01-21 | End: 2019-01-21

## 2019-01-21 RX ORDER — ONDANSETRON 2 MG/ML
4 INJECTION INTRAMUSCULAR; INTRAVENOUS
Status: DISCONTINUED | OUTPATIENT
Start: 2019-01-21 | End: 2019-01-21

## 2019-01-21 RX ORDER — SODIUM CHLORIDE 9 MG/ML
INJECTION, SOLUTION INTRAVENOUS CONTINUOUS PRN
Status: DISCONTINUED | OUTPATIENT
Start: 2019-01-21 | End: 2019-01-21 | Stop reason: SDUPTHER

## 2019-01-21 RX ORDER — ONDANSETRON 2 MG/ML
INJECTION INTRAMUSCULAR; INTRAVENOUS PRN
Status: DISCONTINUED | OUTPATIENT
Start: 2019-01-21 | End: 2019-01-21 | Stop reason: SDUPTHER

## 2019-01-21 RX ORDER — PROMETHAZINE HYDROCHLORIDE 25 MG/ML
6.25 INJECTION, SOLUTION INTRAMUSCULAR; INTRAVENOUS
Status: DISCONTINUED | OUTPATIENT
Start: 2019-01-21 | End: 2019-01-21

## 2019-01-21 RX ADMIN — ONDANSETRON 4 MG: 2 INJECTION INTRAMUSCULAR; INTRAVENOUS at 05:31

## 2019-01-21 RX ADMIN — FENTANYL CITRATE 100 MCG: 50 INJECTION INTRAMUSCULAR; INTRAVENOUS at 19:09

## 2019-01-21 RX ADMIN — SIMETHICONE CHEW TAB 80 MG 80 MG: 80 TABLET ORAL at 16:14

## 2019-01-21 RX ADMIN — ONDANSETRON 4 MG: 2 INJECTION INTRAMUSCULAR; INTRAVENOUS at 19:20

## 2019-01-21 RX ADMIN — MORPHINE SULFATE 2 MG: 2 INJECTION, SOLUTION INTRAMUSCULAR; INTRAVENOUS at 12:57

## 2019-01-21 RX ADMIN — PROPOFOL 125 MG: 10 INJECTION, EMULSION INTRAVENOUS at 19:12

## 2019-01-21 RX ADMIN — IOPAMIDOL 80 ML: 755 INJECTION, SOLUTION INTRAVENOUS at 06:49

## 2019-01-21 RX ADMIN — PIPERACILLIN SODIUM,TAZOBACTAM SODIUM 3.38 G: 3; .375 INJECTION, POWDER, FOR SOLUTION INTRAVENOUS at 08:19

## 2019-01-21 RX ADMIN — SODIUM CHLORIDE: 900 INJECTION, SOLUTION INTRAVENOUS at 19:01

## 2019-01-21 RX ADMIN — GLYCOPYRROLATE 0.2 MG: 0.2 INJECTION INTRAMUSCULAR; INTRAVENOUS at 19:09

## 2019-01-21 RX ADMIN — SODIUM CHLORIDE 1000 ML: 9 INJECTION, SOLUTION INTRAVENOUS at 05:16

## 2019-01-21 RX ADMIN — SODIUM CHLORIDE 1000 ML: 9 INJECTION, SOLUTION INTRAVENOUS at 08:19

## 2019-01-21 RX ADMIN — Medication 10 ML: at 11:44

## 2019-01-21 RX ADMIN — HYDROMORPHONE HYDROCHLORIDE 0.5 MG: 1 INJECTION, SOLUTION INTRAMUSCULAR; INTRAVENOUS; SUBCUTANEOUS at 05:32

## 2019-01-21 RX ADMIN — MORPHINE SULFATE 2 MG: 2 INJECTION, SOLUTION INTRAMUSCULAR; INTRAVENOUS at 16:14

## 2019-01-21 RX ADMIN — SODIUM CHLORIDE: 9 INJECTION, SOLUTION INTRAVENOUS at 12:57

## 2019-01-21 RX ADMIN — MIDAZOLAM HYDROCHLORIDE 2 MG: 1 INJECTION INTRAMUSCULAR; INTRAVENOUS at 19:09

## 2019-01-21 RX ADMIN — KETOROLAC TROMETHAMINE 30 MG: 30 INJECTION, SOLUTION INTRAMUSCULAR at 08:45

## 2019-01-21 ASSESSMENT — PAIN DESCRIPTION - FREQUENCY
FREQUENCY: CONTINUOUS

## 2019-01-21 ASSESSMENT — PULMONARY FUNCTION TESTS
PIF_VALUE: 4
PIF_VALUE: 12
PIF_VALUE: 4
PIF_VALUE: 5
PIF_VALUE: 4
PIF_VALUE: 1
PIF_VALUE: 0
PIF_VALUE: 0
PIF_VALUE: 4
PIF_VALUE: 0
PIF_VALUE: 1
PIF_VALUE: 20
PIF_VALUE: 4
PIF_VALUE: 0
PIF_VALUE: 5
PIF_VALUE: 3
PIF_VALUE: 4
PIF_VALUE: 3
PIF_VALUE: 4
PIF_VALUE: 15
PIF_VALUE: 4
PIF_VALUE: 14
PIF_VALUE: 0
PIF_VALUE: 1
PIF_VALUE: 3
PIF_VALUE: 4
PIF_VALUE: 18
PIF_VALUE: 4
PIF_VALUE: 3
PIF_VALUE: 15
PIF_VALUE: 4
PIF_VALUE: 4
PIF_VALUE: 0
PIF_VALUE: 10
PIF_VALUE: 4
PIF_VALUE: 7
PIF_VALUE: 4
PIF_VALUE: 3
PIF_VALUE: 3

## 2019-01-21 ASSESSMENT — PAIN DESCRIPTION - PROGRESSION
CLINICAL_PROGRESSION: NOT CHANGED
CLINICAL_PROGRESSION: GRADUALLY WORSENING
CLINICAL_PROGRESSION: NOT CHANGED

## 2019-01-21 ASSESSMENT — PAIN DESCRIPTION - ONSET
ONSET: ON-GOING

## 2019-01-21 ASSESSMENT — PAIN SCALES - GENERAL
PAINLEVEL_OUTOF10: 6
PAINLEVEL_OUTOF10: 4
PAINLEVEL_OUTOF10: 0
PAINLEVEL_OUTOF10: 0
PAINLEVEL_OUTOF10: 7
PAINLEVEL_OUTOF10: 2
PAINLEVEL_OUTOF10: 10
PAINLEVEL_OUTOF10: 8
PAINLEVEL_OUTOF10: 10
PAINLEVEL_OUTOF10: 5
PAINLEVEL_OUTOF10: 5
PAINLEVEL_OUTOF10: 6

## 2019-01-21 ASSESSMENT — PAIN DESCRIPTION - DESCRIPTORS
DESCRIPTORS: SHARP;STABBING
DESCRIPTORS: SHARP;STABBING

## 2019-01-21 ASSESSMENT — PAIN DESCRIPTION - LOCATION
LOCATION: FLANK
LOCATION: ABDOMEN
LOCATION: ABDOMEN

## 2019-01-21 ASSESSMENT — PAIN DESCRIPTION - PAIN TYPE
TYPE: ACUTE PAIN

## 2019-01-21 ASSESSMENT — PAIN DESCRIPTION - ORIENTATION
ORIENTATION: LOWER
ORIENTATION: LEFT
ORIENTATION: MID;LOWER

## 2019-01-21 NOTE — ED NOTES
Called report to [address] at [facility]. Pt will be going by private vehicle driven by her sister-in-law. Pt is alert and oriented at transfer to [facility]. Abdominal pain improved. C/o posterior headache/neck pain and MD aware. Pt walked to bathroom by herself prior to discharge and urinated without issues.       Lee Hatfield RN  01/21/19 1000

## 2019-01-21 NOTE — CONSULTS
Urology Attending Consult Note      Reason for Consultation: left ureteral injury s/p hysterectomy on 19    History: 51 yo F with s/p supracervical hysterectomy on 19 (dr. Sujey Espinoza). She presented to [facility] ER with severe Lt abd pain and dysuria. CT shows left ureteral injury with extravasation of contrast posterior and superior to the urinary bladder suggestive of a urinoma causing mild left hydro. Labs WNL. Afebrile. UA unremarkable. Family History, Social History, Review of Systems:  Reviewed and agreed to as per chart    Vitals:  /62   Pulse 86   Temp 98 °F (36.7 °C)   Resp 17   Ht 5' 4\" (1.626 m)   Wt 220 lb (99.8 kg)   LMP 2018   SpO2 100%   Breastfeeding? No   BMI 37.76 kg/m²   Temp  Av.7 °F (37.1 °C)  Min: 98 °F (36.7 °C)  Max: 99.4 °F (37.4 °C)  No intake or output data in the 24 hours ending 19 1104      Physical:   Well developed, well nourished in no acute distress   Mood indicates no abnormalities. Pt doesnt appear depressed   Orientated to time and place   Neck is supple, trachea is midline   Respiratory effort is normal   Cardiovascular show no extremity swelling   Abdomen no masses or hernias are palpated, there is no tenderness. Liver and Spleen appear normal.   Skin show no abnormal lesions   Lymph nodes are not palpated in the inguinal, neck, or axillary area.     Female :    Voiding clear urine       Labs:  WBC:    Lab Results   Component Value Date    WBC 9.9 2019     Hemoglobin/Hematocrit:    Lab Results   Component Value Date    HGB 8.4 2019    HCT 26.8 2019     BMP:    Lab Results   Component Value Date     2019    K 3.9 2019    K 3.7 10/21/2018     2019    CO2 24 2019    BUN 6 2019    LABALBU 3.6 2019    CREATININE 1.0 2019    CALCIUM 9.1 2019    GFRAA >60 2019    LABGLOM 59 2019     PT/INR:    Lab Results   Component Value Date    PROTIME 11.6 01/21/2019    INR 1.02 01/21/2019     PTT:  No results found for: APTT[APTT    Urinalysis: tr blood, 0-2 WBC    Antibiotic Therapy:  None     Imaging: CT w/o abd/pelvis 1/21/19  1. Inferior left ureteral injury with extravasation of contrast posterior and superior to the urinary bladder. The fluid collection identified on the recent CT in the lower pelvis represents a urinoma. The distal left ureter is unopacified and not    clearly visualized. CT abd/pelvis with contrast 1/21/19:  Interval hysterectomy       Multiloculated midline pelvic fluid collection within the surgical bed, suspicious for abscess.  Extensive inflammatory changes in the surrounding fat, surrounding the distal left ureter and producing mild left hydroureteronephrosis         Impression/Plan: 49 yo F s/p hysterectomy on 1/11/19, presents with left abd pain 2/2 left ureteral injury.   -voiding clear urine.   -labs WNL  -keep NPO  -cysto, left retrograde pyelogram, possible left urs, possible left stent with Dr Armenta Pi  -obtain consents     LATESHA Caballero - CNP

## 2019-01-21 NOTE — ED PROVIDER NOTES
[facility] Emergency Department    CHIEF COMPLAINT  Post-op Problem (pt has multiple complaints, pt states she is having post-op abd pain from partial hysterectomy from lower abd incision, pt states she is anxiety, pt states she is having gas pain.)      HISTORY OF PRESENT ILLNESS  Frankey Setter is a 50 y.o. female  presenting to the ER feeling abdominal discomfort and gassy sensation and nausea since having a hysterectomy performed on . She denies any fever or actual vomiting but continues to feel nauseous. Nothing in particular seems to make the symptoms better or worse. She denies any constipation and in fact has had a couple of loose stools. No other complaints, modifying factors or associated symptoms. I have reviewed the following from the nursing documentation. Past Medical History:   Diagnosis Date    Anxiety     Depression     Graves disease     Graves disease     Graves' disease     Graves' disease     History of blood transfusion     Hyperlipidemia     Rapid heart beat     Thyroid disease      Past Surgical History:   Procedure Laterality Date     SECTION      COLONOSCOPY  10/22/2018    COLONOSCOPY WITH BIOPSY performed by Milli Robbins MD at [facility]  10/22/2018    COLONOSCOPY POLYPECTOMY ABLATION performed by Milli Robbins MD at [address] [place] [address] / [address] [place] [address] Rd / [facility]    CYSTOSCOPY BILATERAL  RETROGRADE PYELOGRAM LEFT URETEROSCOPY LEFT URETERAL STENT INSERTION performed by Doreen Alas MD at /[facility] 10      partial hysterectomy.     NE EGD TRANSORAL BIOPSY SINGLE/MULTIPLE N/A 10/22/2018    EGD ESOPHAG[facility]GASTR[facility]OD[facility]    SUPRACERVICAL HYSTERECTOMY WITH BILATERAL SALPINGECTOMY performed by Roque Rebollar MD at [facility] OR     Family History   Problem Relation Age of Onset    Breast Cancer Maternal Aunt     Hypertension Maternal Uncle      Social History     Social History    Marital status: Single     Spouse name: N/A    Number of children: N/A    Years of education: N/A     Occupational History    Not on file.      Social History Main Topics    Smoking status: Current Every Day Smoker     Packs/day: 0.50     Types: Cigarettes    Smokeless tobacco: Never Used    Alcohol use No    Drug use: No    Sexual activity: Not on file     Other Topics Concern    Not on file     Social History Narrative    No narrative on file     Current Facility-Administered Medications   Medication Dose Route Frequency Provider Last Rate Last Dose    phenazopyridine (PYRIDIUM) tablet 200 mg  200 mg Oral TID PRN Viet Ochoa MD   200 mg at 01/22/19 0907    oxyCODONE-acetaminophen (PERCOCET) 5-325 MG per tablet 2 tablet  2 tablet Oral Q6H PRN Aavni Meza MD        oxyCODONE-acetaminophen (PERCOCET) 5-325 MG per tablet 1 tablet  1 tablet Oral Q6H PRN Avani Meza MD   1 tablet at 01/22/19 1426    ondansetron (ZOFRAN) injection 4 mg  4 mg Intravenous Q6H PRN Avani Meza MD        sodium chloride flush 0.9 % injection 10 mL  10 mL Intravenous 2 times per day Avani Meza MD   10 mL at 01/21/19 1144    morphine injection 2 mg  2 mg Intravenous Q3H PRN Avani Meza MD   2 mg at 01/22/19 0546    0.9 % sodium chloride infusion   Intravenous Continuous Avani Meza MD   Stopped at 01/22/19 1227    ondansetron (ZOFRAN-ODT) disintegrating tablet 4 mg  4 mg Oral Q8H PRN Patricia Anguiano MD        propranolol (INDERAL) tablet 10 mg  10 mg Oral BID PRN Patricia Anguiano MD         Allergies   Allergen Reactions    Codeine Hives, Other (See Comments) and Itching     \"freak out\"    Robaxin [Methocarbamol] Other (See Comments)     Migraine, stomach cramps, pain all over body    Diazepam Anxiety       REVIEW OF SYSTEMS  10 systems reviewed, pertinent positives per HPI otherwise noted to be negative. PHYSICAL EXAM  BP 99/61   Pulse 76   Temp 98.2 °F (36.8 °C) (Oral)   Resp 16   Ht 5' 4\" (1.626 m)   Wt 220 lb (99.8 kg)   LMP 12/26/2018   SpO2 98%   Breastfeeding? No   BMI 37.76 kg/m²   GENERAL APPEARANCE: Awake and alert. Cooperative. No acute distress. HEAD: Normocephalic. Atraumatic. EYES: PERRL   ENT: Mucous membranes are moist.   NECK: Supple. Non-tender  HEART: RRR. LUNGS: Respirations unlabored. CTAB. Good air exchange. Speaking comfortably in full sentences. ABDOMEN: Soft. Lower abdominal tenderness to palpation. Lower abdominal incision without definite fluctuance or drainage. No masses. No organomegaly. No guarding or rebound. BACK:  No midline Tenderness. EXTREMITIES: No peripheral edema. Moves all extremities equally. All extremities neurovascularly intact. SKIN: Warm and dry. No acute rashes. NEUROLOGICAL: Alert and oriented. CN's 2-12 intact. No gross facial drooping. Strength 5/5, sensation intact  PSYCHIATRIC: Normal mood and affect. LABS  I have reviewed all labs for this visit.    Results for orders placed or performed during the hospital encounter of 01/21/19   CBC Auto Differential   Result Value Ref Range    WBC 9.9 4.0 - 11.0 K/uL    RBC 3.57 (L) 4.00 - 5.20 M/uL    Hemoglobin 8.4 (L) 12.0 - 16.0 g/dL    Hematocrit 26.8 (L) 36.0 - 48.0 %    MCV 75.2 (L) 80.0 - 100.0 fL    MCH 23.5 (L) 26.0 - 34.0 pg    MCHC 31.3 31.0 - 36.0 g/dL    RDW 23.0 (H) 12.4 - 15.4 %    Platelets 048 987 - 403 K/uL    MPV 7.5 5.0 - 10.5 fL    Neutrophils % 70.4 %    Lymphocytes % 19.2 %    Monocytes % 8.1 %    Eosinophils % 1.4 %    Basophils % 0.9 %    Neutrophils # 7.0 1.7 - 7.7 K/uL    Lymphocytes # 1.9 1.0 - 5.1 K/uL    Monocytes # 0.8 0.0 - 1.3 K/uL    Eosinophils # 0.1 0.0 - 0.6 K/uL    Basophils # 0.1 0.0 - 0.2 K/uL   Comprehensive Metabolic Panel   Result Value Ref Range    Sodium 139 136 - 145 mmol/L    Potassium 3.9 3.5 - 5.1 mmol/L 3.02 (L) 4.00 - 5.20 M/uL    Hemoglobin 7.2 (L) 12.0 - 16.0 g/dL    Hematocrit 22.9 (L) 36.0 - 48.0 %    MCV 75.9 (L) 80.0 - 100.0 fL    MCH 23.8 (L) 26.0 - 34.0 pg    MCHC 31.3 31.0 - 36.0 g/dL    RDW 23.4 (H) 12.4 - 15.4 %    Platelets 075 544 - 140 K/uL    MPV 7.9 5.0 - 10.5 fL   POCT Glucose   Result Value Ref Range    POC Glucose 84 70 - 99 mg/dl    Performed on ACCU-CHEK                ED COURSE/MDM  Patient seen and evaluated. Old records reviewed. Labs and imaging reviewed and results discussed with patient. This is a 69-year-old female presenting with lower abdominal discomfort since having a  on . Patient will be getting a CT of the abdomen pelvis with results pending at this time with care endorsed to the 7 AM physician to follow up results of CT scanning and assess patient for disposition        CLINICAL IMPRESSION  1. Left ureteral injury, initial encounter    2. S/P hysterectomy        Blood pressure 99/61, pulse 76, temperature 98.2 °F (36.8 °C), temperature source Oral, resp. rate 16, height 5' 4\" (1.626 m), weight 220 lb (99.8 kg), last menstrual period 2018, SpO2 98 %, not currently breastfeeding.     DISPOSITION  Artis Thomas 's care was endorsed to the morning physician for reassessment and disposition after reviewing CT imaging          Ethan Whatley DO  19 2032

## 2019-01-21 NOTE — ED PROVIDER NOTES
On for Dr. Jose Rafael Lee at [address] [place] [address]    Pt is here for increasing pain approx 10 days after supracervical hysterectomy performed by Dr. Shravan Ventura. No fevers, nontoxic on exam.    723 I have examined, well appearing lady no acute distress. CT shows:  Interval hysterectomy    Multiloculated midline pelvic fluid collection within the surgical bed, suspicious for abscess. Extensive inflammatory changes in the surrounding fat, surrounding the distal left ureter and producing mild left hydroureteronephrosis    733 I have spoken to Dr. Shravan Ventura by phone, she advises to give fluid bolus, no emergent abx, and send to clinic 845am.      Pt is stable and this is a reasonable plan to have her evaluated by her surgeon. 745am, Dr. Shravan Ventura recommends ureteral imaging, radiologist recommends CT non-contrast using existing recent contrast bolus as our delayed contrast.      845am: CT post-contrast shows ureteral extravasation and fluid collection appears to be urinoma, and less likely abscess. I have just updated Dr. Shravan Ventura and am paging transfer center to speak with urologist on call. 923:  Paula Ca NP on urology with Dr. Clare Paredes notified and she had conveyed she will notify Dr. Clare Paredes urologist.     945am: Dr. Shravan Ventura admitting    DISPOSITION:   [facility]    IMPRESSION:   Post-operative fluid collection, likely uroma  Ureteral disruption/leak, Left       Yocasta Pierce MD  01/21/19 1003

## 2019-01-21 NOTE — PROGRESS NOTES
Patient arrived from [facility] via private car to room 5301. Patient is alert and oriented x 4. Patient states she is having pain at this time but it is tolerable. Patient states she does not want any pain medication at this time. Patient kept NPO for urological procedure later today. Patient signed consent for the procedure. Awaiting to hear what time the patient will go for the procedure. Patient resting in bed at this time. Call light within reach and will continue to monitor the patient.

## 2019-01-21 NOTE — PLAN OF CARE
Problem: Pain:  Goal: Control of acute pain  Control of acute pain   Outcome: Ongoing  Patient rates her pain '8' out of 0-10 pain scale to her lower mid abdomen. Patient medicated with with Morphine 2 mg IV per patient request and per doctor orders. Patient states it helps to bring her pain down to a '4-5' and makes it tolerable. Will continue to monitor the patient.

## 2019-01-22 VITALS
OXYGEN SATURATION: 98 % | WEIGHT: 220 LBS | HEART RATE: 76 BPM | DIASTOLIC BLOOD PRESSURE: 61 MMHG | TEMPERATURE: 98.2 F | BODY MASS INDEX: 37.56 KG/M2 | HEIGHT: 64 IN | RESPIRATION RATE: 16 BRPM | SYSTOLIC BLOOD PRESSURE: 99 MMHG

## 2019-01-22 DIAGNOSIS — S37.10XA LEFT URETERAL INJURY, INITIAL ENCOUNTER: Primary | ICD-10-CM

## 2019-01-22 PROBLEM — N73.9 PELVIC ABSCESS IN FEMALE: Status: RESOLVED | Noted: 2019-01-21 | Resolved: 2019-01-22

## 2019-01-22 LAB
ANION GAP SERPL CALCULATED.3IONS-SCNC: 8 MMOL/L (ref 3–16)
BUN BLDV-MCNC: 5 MG/DL (ref 7–20)
CALCIUM SERPL-MCNC: 8.7 MG/DL (ref 8.3–10.6)
CHLORIDE BLD-SCNC: 108 MMOL/L (ref 99–110)
CO2: 27 MMOL/L (ref 21–32)
CREAT SERPL-MCNC: 0.8 MG/DL (ref 0.6–1.1)
GFR AFRICAN AMERICAN: >60
GFR NON-AFRICAN AMERICAN: >60
GLUCOSE BLD-MCNC: 92 MG/DL (ref 70–99)
HCT VFR BLD CALC: 22.9 % (ref 36–48)
HEMOGLOBIN: 7.2 G/DL (ref 12–16)
MCH RBC QN AUTO: 23.8 PG (ref 26–34)
MCHC RBC AUTO-ENTMCNC: 31.3 G/DL (ref 31–36)
MCV RBC AUTO: 75.9 FL (ref 80–100)
PDW BLD-RTO: 23.4 % (ref 12.4–15.4)
PLATELET # BLD: 271 K/UL (ref 135–450)
PMV BLD AUTO: 7.9 FL (ref 5–10.5)
POTASSIUM SERPL-SCNC: 4.6 MMOL/L (ref 3.5–5.1)
RBC # BLD: 3.02 M/UL (ref 4–5.2)
SODIUM BLD-SCNC: 143 MMOL/L (ref 136–145)
WBC # BLD: 6.6 K/UL (ref 4–11)

## 2019-01-22 PROCEDURE — 96376 TX/PRO/DX INJ SAME DRUG ADON: CPT

## 2019-01-22 PROCEDURE — G0378 HOSPITAL OBSERVATION PER HR: HCPCS

## 2019-01-22 PROCEDURE — 6370000000 HC RX 637 (ALT 250 FOR IP): Performed by: OBSTETRICS & GYNECOLOGY

## 2019-01-22 PROCEDURE — 85027 COMPLETE CBC AUTOMATED: CPT

## 2019-01-22 PROCEDURE — 6370000000 HC RX 637 (ALT 250 FOR IP): Performed by: SURGERY

## 2019-01-22 PROCEDURE — 6360000002 HC RX W HCPCS: Performed by: OBSTETRICS & GYNECOLOGY

## 2019-01-22 PROCEDURE — 80048 BASIC METABOLIC PNL TOTAL CA: CPT

## 2019-01-22 PROCEDURE — 36415 COLL VENOUS BLD VENIPUNCTURE: CPT

## 2019-01-22 RX ORDER — OXYCODONE HYDROCHLORIDE AND ACETAMINOPHEN 5; 325 MG/1; MG/1
1 TABLET ORAL EVERY 6 HOURS PRN
Qty: 30 TABLET | Refills: 0 | Status: SHIPPED | OUTPATIENT
Start: 2019-01-22 | End: 2019-02-05

## 2019-01-22 RX ORDER — CIPROFLOXACIN 500 MG/1
500 TABLET, FILM COATED ORAL 2 TIMES DAILY
Qty: 14 TABLET | Refills: 0 | Status: SHIPPED | OUTPATIENT
Start: 2019-01-22 | End: 2019-01-29

## 2019-01-22 RX ORDER — OXYCODONE HYDROCHLORIDE AND ACETAMINOPHEN 5; 325 MG/1; MG/1
2 TABLET ORAL EVERY 6 HOURS PRN
Status: DISCONTINUED | OUTPATIENT
Start: 2019-01-22 | End: 2019-01-22 | Stop reason: HOSPADM

## 2019-01-22 RX ORDER — OXYCODONE HYDROCHLORIDE AND ACETAMINOPHEN 5; 325 MG/1; MG/1
1 TABLET ORAL EVERY 6 HOURS PRN
Status: DISCONTINUED | OUTPATIENT
Start: 2019-01-22 | End: 2019-01-22 | Stop reason: HOSPADM

## 2019-01-22 RX ORDER — PHENAZOPYRIDINE HYDROCHLORIDE 200 MG/1
200 TABLET, FILM COATED ORAL 3 TIMES DAILY PRN
Qty: 21 TABLET | Refills: 0 | Status: SHIPPED | OUTPATIENT
Start: 2019-01-22 | End: 2019-01-29

## 2019-01-22 RX ORDER — PHENAZOPYRIDINE HYDROCHLORIDE 200 MG/1
200 TABLET, FILM COATED ORAL 3 TIMES DAILY PRN
Status: DISCONTINUED | OUTPATIENT
Start: 2019-01-22 | End: 2019-01-22 | Stop reason: HOSPADM

## 2019-01-22 RX ADMIN — PHENAZOPYRIDINE HYDROCHLORIDE 200 MG: 200 TABLET ORAL at 09:07

## 2019-01-22 RX ADMIN — MORPHINE SULFATE 2 MG: 2 INJECTION, SOLUTION INTRAMUSCULAR; INTRAVENOUS at 02:32

## 2019-01-22 RX ADMIN — OXYCODONE AND ACETAMINOPHEN 1 TABLET: 5; 325 TABLET ORAL at 14:26

## 2019-01-22 RX ADMIN — PHENAZOPYRIDINE HYDROCHLORIDE 200 MG: 200 TABLET ORAL at 18:54

## 2019-01-22 RX ADMIN — MORPHINE SULFATE 2 MG: 2 INJECTION, SOLUTION INTRAMUSCULAR; INTRAVENOUS at 05:46

## 2019-01-22 ASSESSMENT — PAIN DESCRIPTION - PROGRESSION
CLINICAL_PROGRESSION: NOT CHANGED
CLINICAL_PROGRESSION: NOT CHANGED

## 2019-01-22 ASSESSMENT — PAIN SCALES - GENERAL
PAINLEVEL_OUTOF10: 4
PAINLEVEL_OUTOF10: 9
PAINLEVEL_OUTOF10: 1
PAINLEVEL_OUTOF10: 8

## 2019-01-22 ASSESSMENT — PAIN DESCRIPTION - ONSET
ONSET: ON-GOING
ONSET: ON-GOING

## 2019-01-22 ASSESSMENT — PAIN DESCRIPTION - LOCATION
LOCATION: OTHER (COMMENT)
LOCATION: ABDOMEN

## 2019-01-22 ASSESSMENT — PAIN DESCRIPTION - PAIN TYPE
TYPE: SURGICAL PAIN
TYPE: SURGICAL PAIN

## 2019-01-22 ASSESSMENT — PAIN - FUNCTIONAL ASSESSMENT: PAIN_FUNCTIONAL_ASSESSMENT: PREVENTS OR INTERFERES SOME ACTIVE ACTIVITIES AND ADLS

## 2019-01-22 ASSESSMENT — PAIN DESCRIPTION - FREQUENCY
FREQUENCY: INTERMITTENT
FREQUENCY: INTERMITTENT

## 2019-01-22 ASSESSMENT — PAIN DESCRIPTION - DESCRIPTORS
DESCRIPTORS: ACHING;RADIATING
DESCRIPTORS: ACHING

## 2019-01-22 ASSESSMENT — PAIN DESCRIPTION - ORIENTATION: ORIENTATION: LOWER

## 2019-01-22 NOTE — DISCHARGE INSTR - ACTIVITY
No lifting anything heavier than a gallon of milk ( approx 8 lbs) until seen by urology and gyneocology

## 2019-01-22 NOTE — PROGRESS NOTES
Gynecology note     49 y/o   Patient seen at 12:30 PM today   Hospital day #2   POD#1 - cystoscopy, left ureteral stent placement for left ureter injury   POD#13 - S/P SUPRA-CERVICAL HYSTERECTOMY+  BILATERAL  SALPINGECTOMIES AND EXTENSIVE LYSIS OF ADHESIONS     S: Patient is resting comfortably in bed, she denies nausea, no vomiting, pain controlled with current medications, tolerating regular diet.      O:  Vitals signs reviewed in chart, patient is afebrile  Temp: 98.2 Pulse 77 /77 resp 16 SPO2 94%      General: alert and oriented x 3, resting in bed  CVS: Regular rate and rhythm  LUNGS: clear to ausculation bilaterally  ABDOMEN: soft, appropriate tenderness to palpation, + bowel sound   Incision: clean, dry, sutures intact   EXT: SCD, no edema     A/P   49 y/o [address] day #2   POD#1 - cystoscopy, left ureteral stent placement for left ureter injury -  POD#13 - S/P SUPRA-CERVICAL HYSTERECTOMY+  BILATERAL  SALPINGECTOMIES AND EXTENSIVE LYSIS OF ADHESIONS        History of Chronic Anemia - H/H 7.2/22. 9        History of hypertension - BP normal in hospital     per urology plan in chart - ok to discharge patient home today, sharpe for one week and ureteral stent for 10 weeks

## 2019-01-22 NOTE — PROGRESS NOTES
Patient sent down via bed to  surgery department for procedure. Will await patient return after the procedure.

## 2019-01-22 NOTE — PROGRESS NOTES
Pt returned to room 5301 from PACU, VSS. Pt alert and oriented, slightly drowsy. Pt tolerated clears and was from there advanced to crackers, and has had a few bites of turkey/stuffing, tolerating well. Lazo w clear urine, good output. Pt agreeable to use call light as needed and for assistance w ambulation. Bed alarm on, call light within reach.

## 2019-01-22 NOTE — PROGRESS NOTES
Patient admitted to PACU. Post op   CYSTOSCOPY BILATERAL  RETROGRADE PYELOGRAM LEFT URETEROSCOPY LEFT URETERAL STENT INSERTION (Left ) Diagnosis: (LEFT URETER INJURY)   Surgeon: Lorrayne Phoenix, MD Responsible Provider: Luis Dominguez MD   Anesthesia Type: general ASA Status: Not recorded     Report received from anesthesia personnel. No problems noted during the case. Patient drowsy- no complaints of pain or nausea. Lazo catheter #16 in place with yellow urine present. Good peripheral pulses present. VS stable.

## 2019-01-22 NOTE — PROGRESS NOTES
Urology Resident Progress Note      Subjective: Patient complains of suprapubic pain this morning. Pt tolerates regular diet. Pt had cysto and L ureteral stent placement overnight for post hysterectomy ureteral injury. Sharpe catheter in place.       Objective     Exam:  Vitals:    19 2105 19 2348 19 0231 19 0758   BP: 106/66 109/71 107/66 114/77   Pulse: 78 74 84 77   Resp: 16 18 18 16   Temp: 99.4 °F (37.4 °C) 99.5 °F (37.5 °C) 98.1 °F (36.7 °C) 98.2 °F (36.8 °C)   TempSrc: Oral Oral Oral Oral   SpO2: 100% 100% 98% 94%   Weight:       Height:         Temp  Av.4 °F (36.9 °C)  Min: 96.8 °F (36 °C)  Max: 99.5 °F (37.5 °C)    Intake/Output Summary (Last 24 hours) at 19 1048  Last data filed at 19 0800   Gross per 24 hour   Intake          2564.52 ml   Output             3800 ml   Net         -1235.48 ml       General appearance: alert, no acute distress, grooming appropriate  Chest/Lungs: CTAB, no crackles/rales, wheezes/rhonchi, normal effort  Cardiovascular: RRR, no murmurs/gallops/rubs  Abdomen: soft, non-tender, non-distended, +BS, no guarding/rigidity  : sharpe catheter with slightly cloudy yellow urine, no hematuria  Extremities: no edema, no cyanosis  Neuro: A&Ox3, no focal deficits, sensation intact          Labs:  WBC:    Lab Results   Component Value Date    WBC 6.6 2019     Hemoglobin/Hematocrit:  Lab Results   Component Value Date    HGB 7.2 2019    HCT 22.9 2019     BMP:  Lab Results   Component Value Date     2019    K 4.6 2019    K 3.7 10/21/2018     2019    CO2 27 2019    BUN 5 2019    LABALBU 3.6 2019    CREATININE 0.8 2019    CALCIUM 8.7 2019    GFRAA >60 2019    LABGLOM >60 2019     PT/INR:    Lab Results   Component Value Date    PROTIME 11.6 2019    INR 1.02 2019     PTT:  No results found for: APTT[APTT    Urinalysis: negative    Urine Culture: None    Blood Culture:  N/A    Antibiotic Therapy:  Zosyn    Imaging:   CT w/o abd/pelvis 1/21/19  1. Inferior left ureteral injury with extravasation of contrast posterior and superior to the urinary bladder. The fluid collection identified on the recent CT in the lower pelvis represents a urinoma. The distal left ureter is unopacified and not    clearly visualized.      CT abd/pelvis with contrast 1/21/19:  Interval hysterectomy       Multiloculated midline pelvic fluid collection within the surgical bed, suspicious for abscess. Extensive inflammatory changes in the surrounding fat, surrounding the distal left ureter and producing mild left hydroureteronephrosis         Impression/Plan: 51 yo F s/p hysterectomy on 1/11/19, presents with left abd pain 2/2 left ureteral injury.  S/p cysto, left retrograde pyelogram, possible left urs, possible left stent with Dr Yu Philip POD 1  -Voiding clear urine   -Labs WNL  -Stent to stay for 10 weeks to allow healing  -Lazo catheter to stay for 7 days with outpatient voiding trial  -Ok to discharge per urology standpoint    Discussed with staff     Viet Ochoa MD

## 2019-01-22 NOTE — H&P
ADMISSION HISTORY AND PHYSICAL      Chief Taina Wade is a 50 y.o. [id number] female who presented to [place] ER for lower abdominal pain and nausea, POD#10 s/p supracervical hysterectomy for fibroids      HPI:   Patient had a supracervical hysterectomy on 2019 for fibroids, menorrhagia and anemia and was discharged home on 2019 in stable conditions. She had uncomplicated postoperative course until 3 days ago - started having worsening of abdominal pain and nausea associated with lack of appetite, no fevers, no vomiting, states that she has regular bowel movements at home and was urinating ok. Work-up done in ER today -> CT scan of abdomen and pelvis      History: Left hydronephrosis. Recent surgery. Evaluate for ureteral obstruction.       CT of the pelvis without contrast       TECHNIQUE: CT images were obtained from the iliac crests to the lower pelvis without contrast. Individualized dose optimization technique was used in order to meet ALARA standards for radiation dose reduction.  In addition to vendor specific dose    reduction algorithms, the dose reduction techniques vary based on the specific scanner utilized but frequently include automated exposure control, adjustment of the mA and/or kV according to patient size, and use of iterative reconstruction technique. Images were obtained approximately 1 hour after previous contrast demonstration.       FINDINGS:       There is left hydroureter. The right ureter appears unremarkable. Left ureter is dilated and extends inferiorly to the lower pelvis. On axial image 80 there is extravasation of contrast from the ureter. The distal left ureterovesicular junction is not    clearly delineated. Extravasated contrast collection in the fluid collection posterior to the urinary bladder and anterior to the rectum compatible with a urinoma.  Stranding in the lower pelvis is also presumably related to pelvic fluid from ureteral    injury.     Rectum appears unremarkable. Normal appendix. Fat-containing umbilical hernia. No adnexal mass identified.           Impression   1. Inferior left ureteral injury with extravasation of contrast posterior and superior to the urinary bladder. The fluid collection identified on the recent CT in the lower pelvis represents a urinoma.  The distal left ureter is unopacified and not    clearly visualized.      CBC - wbc 9.9 H/H 8.4/26.8 plts 298                                                                                        Past Medical History           Past Medical History:   Diagnosis Date    Anxiety      Depression      Graves disease      Graves disease      Graves' disease      Graves' disease      Hyperlipidemia      Rapid heart beat      Thyroid disease           Past Surgical History             Past Surgical History:   Procedure Laterality Date     SECTION        COLONOSCOPY   10/22/2018     COLONOSCOPY WITH BIOPSY performed by Emerita Huitron MD at [address] [facility]   10/22/2018     COLONOSCOPY POLYPECTOMY ABLATION performed by Emerita Huitron MD at [address] [facility] EGD TRANSORAL BIOPSY SINGLE/MULTIPLE N/A 10/22/2018     EGD ESOPHAGOGASTRODUODENOSCOPY performed by Emerita Huitron MD at [address] [employer]                     OB History    Para Term  AB Living   6       2 4   SAB TAB Ectopic Molar Multiple Live Births   2                 # Outcome Date GA Lbr Marc/2nd Weight Sex Delivery Anes PTL Lv   6                      5                      4                      3                      2 SAB                     1 SAB                            Social History   Social History                Social History    Marital status: Single       Spouse name: N/A    Number of children: N/A    Years of education: N/A            Occupational History    Not on file.                Social History Main Topics    Smoking status: Current Every Day Smoker       Packs/day: 0.50    Smokeless tobacco: Never Used    Alcohol use No    Drug use: No    Sexual activity: Not on file              Other Topics Concern    Not on file            Social History Narrative    No narrative on file                   Allergies   Allergen Reactions    Codeine Hives, Other (See Comments) and Itching       \"freak out\"    Robaxin [Methocarbamol] Other (See Comments)       Migraine, stomach cramps, pain all over body    Diazepam Anxiety      Active Medications               Outpatient Prescriptions Marked as Taking for the 11/19/18 encounter (Office Visit) with Iva Pope MD   Medication Sig Dispense Refill    propranolol (INDERAL) 10 MG tablet Take 1 tablet by mouth 2 times daily 60 tablet 3    simethicone (MYLICON) 80 MG chewable tablet Take 1 tablet by mouth every 6 hours as needed for Flatulence 60 tablet 1    nicotine (NICODERM CQ) 14 MG/24HR Place 1 patch onto the skin daily 30 patch 3    ferrous sulfate dried (SLOW IRON) 160 (50 Fe) MG TBCR extended release tablet Take 160 mg by mouth daily 30 tablet 1    acetaminophen (APAP EXTRA STRENGTH) 500 MG tablet Take 2 tablets by mouth every 8 hours as needed for Pain 120 tablet 1    hydrOXYzine (VISTARIL) 25 MG capsule Take 1 capsule by mouth 3 times daily as needed for Itching 15 capsule 0    ondansetron (ZOFRAN) 4 MG tablet Take 1 tablet by mouth every 8 hours as needed for Nausea or Vomiting 12 tablet 0         Family History             Family History   Problem Relation Age of Onset    Breast Cancer Maternal Aunt      Hypertension Maternal Uncle                 Review of Systems:  Review of Systems   Constitutional: Negative for appetite change, chills, fatigue, fever and unexpected weight change. HENT: Negative for ear pain, hearing loss, mouth sores, sore throat and trouble swallowing.    Eyes: Negative for photophobia and visual disturbance.    Respiratory: Negative for apnea, cough, chest tightness, shortness of breath and wheezing.    Cardiovascular: Negative for chest pain, palpitations and leg swelling. Gastrointestinal: Positive for  abdominal distention, abdominal pain, NO anal bleeding, blood in stool, constipation, diarrhea, nausea and vomiting. Endocrine: Negative for cold intolerance, heat intolerance, polydipsia, polyphagia and polyuria. Genitourinary:   Negative for difficulty urinating, dyspareunia, dysuria, enuresis, frequency, genital sores, hematuria, pelvic pain, urgency, vaginal bleeding, vaginal discharge and vaginal pain. Musculoskeletal: Negative for arthralgias, back pain, joint swelling and myalgias. Skin: Negative for color change and rash. Neurological: Negative for dizziness, seizures, syncope, light-headedness and headaches. Psychiatric/Behavioral: Negative for agitation, behavioral problems, confusion, decreased concentration, dysphoric mood, hallucinations, self-injury, sleep disturbance and suicidal ideas. The patient is not nervous/anxious and is not hyperactive.          Physical Exam:  BP 99/50   Pulse 79   Ht 5' 4\" (1.626 m) Resp 16 SPO2 sat 100%     Body mass index is 37.76 kg/m². Physical Exam   Constitutional: She appears well-developed and well-nourished. She is cooperative. No distress. HENT:   Head: Normocephalic and atraumatic. Mouth/Throat: Oropharynx is clear and moist.   Eyes: Conjunctivae are normal. Right eye exhibits no discharge. Left eye exhibits no discharge. No scleral icterus. Neck: No thyromegaly present. Cardiovascular: Normal rate, regular rhythm and normal heart sounds.    Pulmonary/Chest: Effort normal and breath sounds normal.   Abdominal: Soft. Bowel sounds are normal. Mild distension of abdomen, appropriate tenderness to palpation  INCISION: Clean, dry, sutures intact   Genitourinary: Pelvic exam deferred   Neurological: She is alert. Skin: Skin is warm. No rash noted. No erythema. No pallor. Psychiatric: She has a normal mood and affect. Her behavior is normal. Judgment and thought content normal.         Assessment/Plan:  1. POD # 10 s/p supracervical hysterectomy - presented to ER today with nausea and abdominal pain and distension - CT scan of abdomen and pelvis ->   1. Inferior left ureteral injury with extravasation of contrast posterior and superior to the urinary bladder. The fluid collection identified on the recent CT in the lower pelvis represents a urinoma. The distal left ureter is unopacified and not    clearly visualized.     Urology consulted   NPO   IV fluids and IV pain medications      I had a long discussion with the patient regarding her diagnosis and answered all questions

## 2019-01-23 NOTE — OP NOTE
[facility] [place] [address]                                OPERATIVE REPORT    PATIENT NAME: Susan Wylie                   :        1970  MED REC NO:   [id number]                          ROOM:       6929  ACCOUNT NO:   [de-identified]                           ADMIT DATE: 2019  PROVIDER:     Yoel Diane MD    DATE OF PROCEDURE:  2019    PREOPERATIVE DIAGNOSIS:  Probable left ureteral injury, status post  hysterectomy. POSTOPERATIVE DIAGNOSIS:  Probable left ureteral injury, status post  hysterectomy. OPERATION PERFORMED:  Cystoscopy, bilateral retrograde pyelogram, left  ureteroscopy, left ureteral stent placement. SURGEON:  Yoel Diane MD.    ANESTHESIA:  General.    EBL:  Minimal.    COMPLICATIONS:  None. HISTORY:  A 42-year-old woman who has had a  section,  hysterectomy, approximately 10 days ago, developed some acute lower  abdominal pain on the left side. CT scan shows what appears to be a  left ureteral injury with extravasation, now presents for cystoscopy  with an attempted stent placement. PROCEDURE IN DETAIL:  After informed consent was obtained, the patient  was taken to the operating room. General anesthesia was performed,  placed in the lithotomy position, prepped and draped in the usual  sterile fashion. A 21-Armenian cystoscope was placed in the bladder. Bladder was systematically examined with a 370-degree lens. The  ureteral orifices were normal.  No bladder tumors or foreign bodies  present. The bladder was intact. Cone tip catheter was engaged in the  right ureteral orifice and right retrograde pyelogram was performed  which was completely normal.  Left retrograde pyelogram was then  performed, which revealed ureteral injury with some medial contrast  extravasation, just above the intramural tunnel on the left side.   A  0.025 FiberWire and ureteroscope were advanced into the ureter. We were  unable to initially get a wire up into the renal collecting system. With the aid of the ureteroscope, we were able to see the opening of the  ureter, but not able to get pass the injury. We did advance an 0.025  FiberWire up into the renal pelvis. We removed the ureteroscope and  passed a 5-Namibian open-ended catheter up into what appeared to be renal  pelvis. We removed the wire, retrograde pyelogram confirmed we were  present in the collecting system. We then replaced the wire, removed  the catheter and placed a 6-Namibian x 24 cm double-J stent with a good  curl in renal pelvis and good curl in the bladder. The string was  removed. The wire was removed. A stent was left to dependent drainage. A 16-Namibian Lazo catheter was then placed to dependent drainage in the  bladder. The patient tolerated the procedure well and was brought back  to the recovery room in stable condition. PLAN:  The patient will go home tomorrow with a catheter for one week. The patient need to be setup in 10 weeks for cystoscopy, left stent  removal, left retrograde pyelogram, and ureteroscopy with a possible  stent placement _____.         Dimitri Warner MD    D: 01/22/2019 13:41:54       T: 01/22/2019 16:00:51     [email or web address]  Job#: [id number]     Doc#: [id number]    CC:

## 2019-01-23 NOTE — PROGRESS NOTES
Discharge Progress Note  1/22/2019 7:24 PM    Data:  Discharge order received. Action:  IV D/C'd without difficulty. See Doc Flowsheets for assessment. Patient given discharge instructions with prescriptions. Response:  Patient verbalized understanding of discharge instructions. Patient left with all belongings. Oneal Morris RN.  Electronically signed by Oneal Morris RN on 1/22/2019 at 7:25 PM    ________________________________________________________________________

## 2019-01-24 NOTE — DISCHARGE SUMMARY
[address]                [address]                               DISCHARGE SUMMARY  PATIENT NAME: Tali Fitzpatrick                   :        1970  MED REC NO:   [id number]                          ROOM:       5301  ACCOUNT NO:   [de-identified]                           ADMIT DATE: 2019  PROVIDER:     Tahir Díaz MD                  DISCHARGE DATE:  2019    ADMISSION DIAGNOSIS:  Left ureteral injury. DISCHARGE DIAGNOSIS:  Left ureteral injury. PROCEDURE PERFORMED:  Cystoscopy, bilateral  retrograde pyelogram and left ureteroscopy and a left ureteral stent  insertion. HOSPITAL COURSE:  The patient is a 51-year-old female who had a  supracervical hysterectomy with bilateral salpingectomies and extensive  lysis of adhesions on 2019. She was discharged home on 2019 in stable condition. The patient presented to the [facility] Emergency Room  complaining of abdominal pain and abdominal distention. A CT scan of  the abdomen and pelvis was done which showed a left ureteral leakage and a 5 cm urinoma. As such she was admitted to the hospital for further investigation. She  underwent the procedure described above by Dr. Brandt Jenkins. The ureteral leakage was related her hysterectomy since she had extensive adhesions that were lysed during that surgery. Her postsurgery course was uncomplicated and she was discharged home on  2019 in stable condition. Follow-up: UROLOGY GROUP IN ONE WEEK              Gynecology in one week     DISCHARGE CONDITION:  Stable. DISCHARGE MEDICATIONS:  1. Ciprofloxacin. 2.  Percocet. 3.  Motrin. 4.  Colace. 5.  Simethicone.         Alex Prescott MD    D: 2019 13:40:09       T: 2019 6:24:30     ALIA/AKASH  Job#: [id number]     Doc#: [id number]    CC:

## 2019-01-25 DIAGNOSIS — K59.09 OTHER CONSTIPATION: Primary | ICD-10-CM

## 2019-01-25 RX ORDER — BISACODYL 10 MG
SUPPOSITORY, RECTAL RECTAL
Qty: 4 SUPPOSITORY | Refills: 0 | Status: SHIPPED | OUTPATIENT
Start: 2019-01-25

## 2019-01-30 ENCOUNTER — TELEPHONE (OUTPATIENT)
Dept: GYNECOLOGY | Age: 49
End: 2019-01-30

## 2019-01-30 DIAGNOSIS — N30.00 ACUTE CYSTITIS WITHOUT HEMATURIA: Primary | ICD-10-CM

## 2019-01-30 RX ORDER — NITROFURANTOIN 25; 75 MG/1; MG/1
100 CAPSULE ORAL 2 TIMES DAILY
Qty: 14 CAPSULE | Refills: 0 | Status: SHIPPED | OUTPATIENT
Start: 2019-01-30 | End: 2019-02-06

## 2019-02-06 ENCOUNTER — OFFICE VISIT (OUTPATIENT)
Dept: GYNECOLOGY | Age: 49
End: 2019-02-06

## 2019-02-06 VITALS
BODY MASS INDEX: 37.39 KG/M2 | DIASTOLIC BLOOD PRESSURE: 64 MMHG | WEIGHT: 219 LBS | HEART RATE: 78 BPM | HEIGHT: 64 IN | SYSTOLIC BLOOD PRESSURE: 99 MMHG

## 2019-02-06 DIAGNOSIS — Z09 POSTOPERATIVE EXAMINATION: ICD-10-CM

## 2019-02-06 DIAGNOSIS — D64.9 ANEMIA, UNSPECIFIED TYPE: ICD-10-CM

## 2019-02-06 DIAGNOSIS — N30.00 ACUTE CYSTITIS WITHOUT HEMATURIA: ICD-10-CM

## 2019-02-06 DIAGNOSIS — Z09 POSTOPERATIVE EXAMINATION: Primary | ICD-10-CM

## 2019-02-06 LAB
ANION GAP SERPL CALCULATED.3IONS-SCNC: 10 MMOL/L (ref 3–16)
BUN BLDV-MCNC: 7 MG/DL (ref 7–20)
CALCIUM SERPL-MCNC: 9.3 MG/DL (ref 8.3–10.6)
CHLORIDE BLD-SCNC: 102 MMOL/L (ref 99–110)
CO2: 24 MMOL/L (ref 21–32)
CREAT SERPL-MCNC: 0.6 MG/DL (ref 0.6–1.1)
GFR AFRICAN AMERICAN: >60
GFR NON-AFRICAN AMERICAN: >60
GLUCOSE BLD-MCNC: 94 MG/DL (ref 70–99)
HCT VFR BLD CALC: 28 % (ref 36–48)
HEMOGLOBIN: 9.1 G/DL (ref 12–16)
MCH RBC QN AUTO: 23.4 PG (ref 26–34)
MCHC RBC AUTO-ENTMCNC: 32.4 G/DL (ref 31–36)
MCV RBC AUTO: 72.1 FL (ref 80–100)
PDW BLD-RTO: 21.4 % (ref 12.4–15.4)
PLATELET # BLD: 440 K/UL (ref 135–450)
PMV BLD AUTO: 8 FL (ref 5–10.5)
POTASSIUM SERPL-SCNC: 4.2 MMOL/L (ref 3.5–5.1)
RBC # BLD: 3.89 M/UL (ref 4–5.2)
SODIUM BLD-SCNC: 136 MMOL/L (ref 136–145)
WBC # BLD: 8.5 K/UL (ref 4–11)

## 2019-02-06 PROCEDURE — 99024 POSTOP FOLLOW-UP VISIT: CPT | Performed by: OBSTETRICS & GYNECOLOGY

## 2019-02-06 RX ORDER — PHENAZOPYRIDINE HYDROCHLORIDE 200 MG/1
TABLET, FILM COATED ORAL
Qty: 30 TABLET | Refills: 0 | Status: SHIPPED | OUTPATIENT
Start: 2019-02-06 | End: 2019-02-08 | Stop reason: SDUPTHER

## 2019-02-06 RX ORDER — SULFAMETHOXAZOLE AND TRIMETHOPRIM 800; 160 MG/1; MG/1
1 TABLET ORAL 2 TIMES DAILY
Qty: 20 TABLET | Refills: 0 | Status: SHIPPED | OUTPATIENT
Start: 2019-02-06 | End: 2019-02-16

## 2019-02-06 ASSESSMENT — ENCOUNTER SYMPTOMS
RECTAL PAIN: 0
VOMITING: 0
CHEST TIGHTNESS: 0
COLOR CHANGE: 0
TROUBLE SWALLOWING: 0
BACK PAIN: 0
CONSTIPATION: 0
COUGH: 0
PHOTOPHOBIA: 0
SHORTNESS OF BREATH: 0
BLOOD IN STOOL: 0
WHEEZING: 0
SORE THROAT: 0
APNEA: 0
DIARRHEA: 0
ABDOMINAL PAIN: 0
ABDOMINAL DISTENTION: 0
ANAL BLEEDING: 0
NAUSEA: 0

## 2019-02-08 DIAGNOSIS — B37.41 CANDIDA CYSTITIS: Primary | ICD-10-CM

## 2019-02-08 LAB
ORGANISM: ABNORMAL
URINE CULTURE, ROUTINE: ABNORMAL
URINE CULTURE, ROUTINE: ABNORMAL

## 2019-02-08 RX ORDER — PHENAZOPYRIDINE HYDROCHLORIDE 200 MG/1
TABLET, FILM COATED ORAL
Qty: 30 TABLET | Refills: 0 | Status: SHIPPED | OUTPATIENT
Start: 2019-02-08 | End: 2020-09-29

## 2019-02-08 RX ORDER — FLUCONAZOLE 200 MG/1
TABLET ORAL
Qty: 14 TABLET | Refills: 0 | Status: SHIPPED | OUTPATIENT
Start: 2019-02-08

## 2019-02-22 ENCOUNTER — OFFICE VISIT (OUTPATIENT)
Dept: GYNECOLOGY | Age: 49
End: 2019-02-22

## 2019-02-22 VITALS
BODY MASS INDEX: 37.59 KG/M2 | WEIGHT: 219 LBS | HEART RATE: 89 BPM | SYSTOLIC BLOOD PRESSURE: 145 MMHG | DIASTOLIC BLOOD PRESSURE: 77 MMHG

## 2019-02-22 DIAGNOSIS — Z09 POSTOP CHECK: ICD-10-CM

## 2019-02-22 DIAGNOSIS — N30.00 ACUTE CYSTITIS WITHOUT HEMATURIA: Primary | ICD-10-CM

## 2019-02-22 PROCEDURE — 99024 POSTOP FOLLOW-UP VISIT: CPT | Performed by: OBSTETRICS & GYNECOLOGY

## 2019-02-22 RX ORDER — PHENAZOPYRIDINE HYDROCHLORIDE 200 MG/1
TABLET, FILM COATED ORAL
Qty: 30 TABLET | Refills: 0 | Status: SHIPPED | OUTPATIENT
Start: 2019-02-22 | End: 2020-09-29

## 2019-02-22 ASSESSMENT — ENCOUNTER SYMPTOMS
SORE THROAT: 0
ABDOMINAL PAIN: 0
ANAL BLEEDING: 0
COUGH: 0
BLOOD IN STOOL: 0
VOMITING: 0
TROUBLE SWALLOWING: 0
PHOTOPHOBIA: 0
CONSTIPATION: 0
NAUSEA: 0
WHEEZING: 0
DIARRHEA: 0
RECTAL PAIN: 0
SHORTNESS OF BREATH: 0
ABDOMINAL DISTENTION: 0
BACK PAIN: 0
COLOR CHANGE: 0
CHEST TIGHTNESS: 0
APNEA: 0

## 2019-03-21 ENCOUNTER — APPOINTMENT (OUTPATIENT)
Dept: CT IMAGING | Age: 49
DRG: 871 | End: 2019-03-21
Payer: COMMERCIAL

## 2019-03-21 ENCOUNTER — HOSPITAL ENCOUNTER (INPATIENT)
Age: 49
LOS: 5 days | Discharge: HOME | DRG: 871 | End: 2019-03-26
Attending: EMERGENCY MEDICINE | Admitting: UROLOGY
Payer: COMMERCIAL

## 2019-03-21 DIAGNOSIS — N13.5 URETERAL OBSTRUCTION: Primary | ICD-10-CM

## 2019-03-21 PROBLEM — R10.9 FLANK PAIN, ACUTE: Status: ACTIVE | Noted: 2019-03-21

## 2019-03-21 PROBLEM — N13.30 HYDRONEPHROSIS: Status: ACTIVE | Noted: 2019-03-21

## 2019-03-21 LAB
ANION GAP SERPL CALCULATED.3IONS-SCNC: 11 MMOL/L (ref 3–16)
ANISOCYTOSIS: ABNORMAL
BACTERIA: ABNORMAL /HPF
BASE EXCESS VENOUS: -1 (ref -3–3)
BASOPHILS ABSOLUTE: 0 K/UL (ref 0–0.2)
BASOPHILS RELATIVE PERCENT: 0.2 %
BILIRUBIN URINE: NEGATIVE
BLOOD, URINE: ABNORMAL
BUN BLDV-MCNC: 7 MG/DL (ref 7–20)
CALCIUM SERPL-MCNC: 9.2 MG/DL (ref 8.3–10.6)
CHLORIDE BLD-SCNC: 100 MMOL/L (ref 99–110)
CLARITY: CLEAR
CO2: 24 MMOL/L (ref 21–32)
COLOR: YELLOW
CREAT SERPL-MCNC: 0.9 MG/DL (ref 0.6–1.1)
EOSINOPHILS ABSOLUTE: 0 K/UL (ref 0–0.6)
EOSINOPHILS RELATIVE PERCENT: 0.2 %
EPITHELIAL CELLS, UA: ABNORMAL /HPF
GFR AFRICAN AMERICAN: >60
GFR NON-AFRICAN AMERICAN: >60
GLUCOSE BLD-MCNC: 118 MG/DL (ref 70–99)
GLUCOSE URINE: NEGATIVE MG/DL
HCO3 VENOUS: 23.2 MMOL/L (ref 23–29)
HCT VFR BLD CALC: 30.4 % (ref 36–48)
HEMOGLOBIN: 9.4 G/DL (ref 12–16)
KETONES, URINE: NEGATIVE MG/DL
LACTATE: 0.74 MMOL/L (ref 0.4–2)
LEUKOCYTE ESTERASE, URINE: ABNORMAL
LYMPHOCYTES ABSOLUTE: 0.3 K/UL (ref 1–5.1)
LYMPHOCYTES RELATIVE PERCENT: 2.3 %
MCH RBC QN AUTO: 20.9 PG (ref 26–34)
MCHC RBC AUTO-ENTMCNC: 31.1 G/DL (ref 31–36)
MCV RBC AUTO: 67.2 FL (ref 80–100)
MICROCYTES: ABNORMAL
MICROSCOPIC EXAMINATION: YES
MONOCYTES ABSOLUTE: 0.4 K/UL (ref 0–1.3)
MONOCYTES RELATIVE PERCENT: 2.9 %
NEUTROPHILS ABSOLUTE: 11.8 K/UL (ref 1.7–7.7)
NEUTROPHILS RELATIVE PERCENT: 94.4 %
NITRITE, URINE: NEGATIVE
O2 SAT, VEN: 80 %
PCO2, VEN: 32.4 MM HG (ref 40–50)
PDW BLD-RTO: 21.7 % (ref 12.4–15.4)
PERFORMED ON: ABNORMAL
PH UA: 6 (ref 5–8)
PH VENOUS: 7.46 (ref 7.35–7.45)
PLATELET # BLD: 255 K/UL (ref 135–450)
PMV BLD AUTO: 8.8 FL (ref 5–10.5)
PO2, VEN: 41 MM HG
POC SAMPLE TYPE: ABNORMAL
POTASSIUM SERPL-SCNC: 4.3 MMOL/L (ref 3.5–5.1)
PROTEIN UA: NEGATIVE MG/DL
RBC # BLD: 4.52 M/UL (ref 4–5.2)
RBC UA: ABNORMAL /HPF (ref 0–2)
RENAL EPITHELIAL, UA: ABNORMAL /HPF
SCHISTOCYTES: ABNORMAL
SODIUM BLD-SCNC: 135 MMOL/L (ref 136–145)
SPECIFIC GRAVITY UA: 1.01 (ref 1–1.03)
TARGET CELLS: ABNORMAL
TCO2 CALC VENOUS: 24 MMOL/L
TEAR DROP CELLS: ABNORMAL
URINE TYPE: ABNORMAL
UROBILINOGEN, URINE: 0.2 E.U./DL
WBC # BLD: 12.5 K/UL (ref 4–11)
WBC UA: ABNORMAL /HPF (ref 0–5)
YEAST: PRESENT /HPF

## 2019-03-21 PROCEDURE — 96376 TX/PRO/DX INJ SAME DRUG ADON: CPT

## 2019-03-21 PROCEDURE — 96375 TX/PRO/DX INJ NEW DRUG ADDON: CPT

## 2019-03-21 PROCEDURE — 74177 CT ABD & PELVIS W/CONTRAST: CPT

## 2019-03-21 PROCEDURE — 82803 BLOOD GASES ANY COMBINATION: CPT

## 2019-03-21 PROCEDURE — 6360000002 HC RX W HCPCS: Performed by: NURSE PRACTITIONER

## 2019-03-21 PROCEDURE — 2580000003 HC RX 258: Performed by: NURSE PRACTITIONER

## 2019-03-21 PROCEDURE — 83605 ASSAY OF LACTIC ACID: CPT

## 2019-03-21 PROCEDURE — 81001 URINALYSIS AUTO W/SCOPE: CPT

## 2019-03-21 PROCEDURE — 6370000000 HC RX 637 (ALT 250 FOR IP): Performed by: NURSE PRACTITIONER

## 2019-03-21 PROCEDURE — 85025 COMPLETE CBC W/AUTO DIFF WBC: CPT

## 2019-03-21 PROCEDURE — G0378 HOSPITAL OBSERVATION PER HR: HCPCS

## 2019-03-21 PROCEDURE — 80048 BASIC METABOLIC PNL TOTAL CA: CPT

## 2019-03-21 PROCEDURE — 6360000002 HC RX W HCPCS: Performed by: EMERGENCY MEDICINE

## 2019-03-21 PROCEDURE — 6360000004 HC RX CONTRAST MEDICATION: Performed by: EMERGENCY MEDICINE

## 2019-03-21 PROCEDURE — 2580000003 HC RX 258: Performed by: EMERGENCY MEDICINE

## 2019-03-21 PROCEDURE — 99285 EMERGENCY DEPT VISIT HI MDM: CPT

## 2019-03-21 PROCEDURE — 96365 THER/PROPH/DIAG IV INF INIT: CPT

## 2019-03-21 PROCEDURE — 6370000000 HC RX 637 (ALT 250 FOR IP): Performed by: EMERGENCY MEDICINE

## 2019-03-21 PROCEDURE — 96361 HYDRATE IV INFUSION ADD-ON: CPT

## 2019-03-21 PROCEDURE — 6360000002 HC RX W HCPCS: Performed by: UROLOGY

## 2019-03-21 PROCEDURE — 74176 CT ABD & PELVIS W/O CONTRAST: CPT

## 2019-03-21 RX ORDER — LORAZEPAM 2 MG/ML
0.5 INJECTION INTRAMUSCULAR EVERY 4 HOURS PRN
Status: DISCONTINUED | OUTPATIENT
Start: 2019-03-21 | End: 2019-03-26 | Stop reason: HOSPADM

## 2019-03-21 RX ORDER — OXYCODONE HYDROCHLORIDE AND ACETAMINOPHEN 5; 325 MG/1; MG/1
1 TABLET ORAL EVERY 4 HOURS PRN
Status: DISCONTINUED | OUTPATIENT
Start: 2019-03-21 | End: 2019-03-26 | Stop reason: HOSPADM

## 2019-03-21 RX ORDER — DIAZEPAM 2 MG/1
2 TABLET ORAL EVERY 4 HOURS PRN
Status: DISCONTINUED | OUTPATIENT
Start: 2019-03-21 | End: 2019-03-21

## 2019-03-21 RX ORDER — 0.9 % SODIUM CHLORIDE 0.9 %
1000 INTRAVENOUS SOLUTION INTRAVENOUS ONCE
Status: COMPLETED | OUTPATIENT
Start: 2019-03-21 | End: 2019-03-21

## 2019-03-21 RX ORDER — ONDANSETRON 2 MG/ML
4 INJECTION INTRAMUSCULAR; INTRAVENOUS ONCE
Status: COMPLETED | OUTPATIENT
Start: 2019-03-21 | End: 2019-03-21

## 2019-03-21 RX ORDER — ACETAMINOPHEN 325 MG/1
650 TABLET ORAL ONCE
Status: COMPLETED | OUTPATIENT
Start: 2019-03-21 | End: 2019-03-21

## 2019-03-21 RX ORDER — KETOROLAC TROMETHAMINE 30 MG/ML
15 INJECTION, SOLUTION INTRAMUSCULAR; INTRAVENOUS ONCE
Status: COMPLETED | OUTPATIENT
Start: 2019-03-21 | End: 2019-03-21

## 2019-03-21 RX ORDER — OXYCODONE HYDROCHLORIDE AND ACETAMINOPHEN 5; 325 MG/1; MG/1
2 TABLET ORAL EVERY 4 HOURS PRN
Status: DISCONTINUED | OUTPATIENT
Start: 2019-03-21 | End: 2019-03-26 | Stop reason: HOSPADM

## 2019-03-21 RX ORDER — OXYBUTYNIN CHLORIDE 5 MG/1
10 TABLET, EXTENDED RELEASE ORAL DAILY
Status: DISCONTINUED | OUTPATIENT
Start: 2019-03-21 | End: 2019-03-26 | Stop reason: HOSPADM

## 2019-03-21 RX ADMIN — LORAZEPAM 0.5 MG: 2 INJECTION INTRAMUSCULAR; INTRAVENOUS at 17:22

## 2019-03-21 RX ADMIN — HYDROMORPHONE HYDROCHLORIDE 1 MG: 1 INJECTION, SOLUTION INTRAMUSCULAR; INTRAVENOUS; SUBCUTANEOUS at 05:23

## 2019-03-21 RX ADMIN — HYDROMORPHONE HYDROCHLORIDE 0.5 MG: 1 INJECTION, SOLUTION INTRAMUSCULAR; INTRAVENOUS; SUBCUTANEOUS at 17:21

## 2019-03-21 RX ADMIN — KETOROLAC TROMETHAMINE 15 MG: 30 INJECTION, SOLUTION INTRAMUSCULAR; INTRAVENOUS at 08:47

## 2019-03-21 RX ADMIN — OXYCODONE AND ACETAMINOPHEN 2 TABLET: 5; 325 TABLET ORAL at 21:10

## 2019-03-21 RX ADMIN — IOPAMIDOL 80 ML: 755 INJECTION, SOLUTION INTRAVENOUS at 06:22

## 2019-03-21 RX ADMIN — ONDANSETRON 4 MG: 2 INJECTION INTRAMUSCULAR; INTRAVENOUS at 05:23

## 2019-03-21 RX ADMIN — ACETAMINOPHEN 650 MG: 325 TABLET ORAL at 16:16

## 2019-03-21 RX ADMIN — CEFTRIAXONE 1 G: 1 INJECTION, POWDER, FOR SOLUTION INTRAMUSCULAR; INTRAVENOUS at 13:16

## 2019-03-21 RX ADMIN — DIAZEPAM 2 MG: 2 TABLET ORAL at 13:17

## 2019-03-21 RX ADMIN — HYDROMORPHONE HYDROCHLORIDE 0.5 MG: 1 INJECTION, SOLUTION INTRAMUSCULAR; INTRAVENOUS; SUBCUTANEOUS at 20:25

## 2019-03-21 RX ADMIN — OXYBUTYNIN CHLORIDE 10 MG: 5 TABLET, FILM COATED, EXTENDED RELEASE ORAL at 11:13

## 2019-03-21 RX ADMIN — SODIUM CHLORIDE 1000 ML: 9 INJECTION, SOLUTION INTRAVENOUS at 05:23

## 2019-03-21 ASSESSMENT — PAIN SCALES - GENERAL
PAINLEVEL_OUTOF10: 7
PAINLEVEL_OUTOF10: 10
PAINLEVEL_OUTOF10: 8
PAINLEVEL_OUTOF10: 7

## 2019-03-21 ASSESSMENT — PAIN DESCRIPTION - PAIN TYPE
TYPE: ACUTE PAIN
TYPE: ACUTE PAIN

## 2019-03-21 ASSESSMENT — ENCOUNTER SYMPTOMS
RESPIRATORY NEGATIVE: 1
ABDOMINAL PAIN: 1
CONSTIPATION: 0
EYES NEGATIVE: 1
NAUSEA: 1
VOMITING: 0
BACK PAIN: 1
DIARRHEA: 0

## 2019-03-21 ASSESSMENT — PAIN DESCRIPTION - ONSET: ONSET: PROGRESSIVE

## 2019-03-21 ASSESSMENT — PAIN DESCRIPTION - PROGRESSION: CLINICAL_PROGRESSION: RAPIDLY WORSENING

## 2019-03-21 ASSESSMENT — PAIN DESCRIPTION - DESCRIPTORS: DESCRIPTORS: CRUSHING

## 2019-03-21 ASSESSMENT — PAIN DESCRIPTION - ORIENTATION: ORIENTATION: LEFT;RIGHT;LOWER

## 2019-03-21 ASSESSMENT — PAIN DESCRIPTION - LOCATION
LOCATION: ABDOMEN;BACK
LOCATION: BACK

## 2019-03-21 ASSESSMENT — PAIN DESCRIPTION - FREQUENCY: FREQUENCY: CONTINUOUS

## 2019-03-21 ASSESSMENT — PAIN - FUNCTIONAL ASSESSMENT: PAIN_FUNCTIONAL_ASSESSMENT: PREVENTS OR INTERFERES SOME ACTIVE ACTIVITIES AND ADLS

## 2019-03-21 NOTE — ED PROVIDER NOTES
1 [facility]  EMERGENCY DEPARTMENT ENCOUNTER          ATTENDING PHYSICIAN NOTE       Date of evaluation: 3/21/2019    Chief Complaint     Post-op Problem      History of Present Illness     Hever Day is a 50 y.o. female who presents to the emergency department complaining of left flank and back pain. Patient has a history of hysterectomy in January that was complicated by adhesions and likely left ureteral injury after lysis of these adhesions. She was seen by urology and had a cystoscopy performed and stent placed which was in place for 8 weeks. This stent was removed less than 24 hours ago. Patient states her last several hours she denied having increasing pain on the left flank and left back. She states this pain feels similar to right after her hysterectomy but more severe. She has had nausea associated with this as well but no vomiting. She has had subjective fevers at home. She does note blood in her urine but denies any pain with urination. She denies any cough or shortness of breath. She did not try taking anything at home because of the nausea. She did contact the on-call provider for her urologist who recommended she come to the emergency department for evaluation. Review of Systems     Review of Systems   Constitutional: Negative. HENT: Negative. Eyes: Negative. Respiratory: Negative. Cardiovascular: Negative. Gastrointestinal: Positive for abdominal pain and nausea. Negative for constipation, diarrhea and vomiting. Genitourinary: Positive for flank pain and hematuria. Negative for dysuria. Musculoskeletal: Positive for back pain. All other systems reviewed and are negative. Past Medical, Surgical, Family, and Social History     She has a past medical history of Anxiety, Depression, Graves disease, Graves disease, Graves' disease, Graves' disease, History of blood transfusion, Hyperlipidemia, Rapid heart beat, and Thyroid disease.   She has a past surgical history that includes  section; pr egd transoral biopsy single/multiple (N/A, 10/22/2018); Colonoscopy (10/22/2018); Colonoscopy (10/22/2018); primitivo and bso (cervix removed) (Bilateral, 2019); Hysterectomy; and CYSTOSCOPY INSERTION / REMOVAL STENT / STONE (Left, 2019). Her family history includes Breast Cancer in her maternal aunt; Hypertension in her maternal uncle. She reports that she has been smoking cigarettes. She has been smoking about 0.50 packs per day. She has never used smokeless tobacco. She reports that she does not drink alcohol or use drugs. Medications     Previous Medications    BISACODYL (BISAC-EVAC) 10 MG SUPPOSITORY    Place one suppository per rectum qd as needed for constipation    FERROUS SULFATE DRIED (SLOW IRON) 160 (50 FE) MG TBCR EXTENDED RELEASE TABLET    Take 160 mg by mouth daily    FLUCONAZOLE (DIFLUCAN) 200 MG TABLET    Take one tablet po qd    IBUPROFEN (ADVIL;MOTRIN) 600 MG TABLET    Take 600 mg by mouth 3 times daily (with meals)    ONDANSETRON (ZOFRAN-ODT) 4 MG DISINTEGRATING TABLET    Take 1 tablet by mouth every 8 hours as needed for Nausea    PHENAZOPYRIDINE (PYRIDIUM) 200 MG TABLET    Take one tablet po bid prn painful urination    PHENAZOPYRIDINE (PYRIDIUM) 200 MG TABLET    Take one pill po bid prn pain    PROPRANOLOL (INDERAL) 10 MG TABLET    Take 10 mg by mouth 2 times daily as needed       Allergies     She is allergic to codeine; robaxin [methocarbamol]; and diazepam.    Physical Exam     INITIAL VITALS: BP: 104/73, Temp: 99.2 °F (37.3 °C), Pulse: 93, Resp: 20, SpO2: 96 %    Physical Exam   Constitutional: She is oriented to person, place, and time. She appears well-developed and well-nourished. No distress. Appears uncomfortable. HENT:   Head: Normocephalic and atraumatic. Mouth/Throat: Oropharynx is clear and moist. No oropharyngeal exudate. Eyes: Pupils are equal, round, and reactive to light.  Conjunctivae and EOM are normal. Neck: Normal range of motion. Neck supple. No JVD present. Cardiovascular: Normal rate, regular rhythm and normal heart sounds. Exam reveals no gallop and no friction rub. No murmur heard. Pulmonary/Chest: Effort normal and breath sounds normal. She has no wheezes. She has no rales. Abdominal: Soft. Bowel sounds are normal. There is tenderness in the left upper quadrant. There is no rebound and no guarding. Musculoskeletal: Normal range of motion. She exhibits no edema. Lymphadenopathy:     She has no cervical adenopathy. Neurological: She is alert and oriented to person, place, and time. No cranial nerve deficit. She exhibits normal muscle tone. Coordination normal.   Skin: Skin is warm and dry. No erythema. Nursing note and vitals reviewed.       Diagnostic Results     RADIOLOGY:  CT ABDOMEN PELVIS W IV CONTRAST Additional Contrast? None    (Results Pending)   CT ABDOMEN PELVIS WO CONTRAST Additional Contrast? None    (Results Pending)       LABS:   Results for orders placed or performed during the hospital encounter of 09/86/03   Basic Metabolic Panel (EP - 1)   Result Value Ref Range    Sodium 135 (L) 136 - 145 mmol/L    Potassium 4.3 3.5 - 5.1 mmol/L    Chloride 100 99 - 110 mmol/L    CO2 24 21 - 32 mmol/L    Anion Gap 11 3 - 16    Glucose 118 (H) 70 - 99 mg/dL    BUN 7 7 - 20 mg/dL    CREATININE 0.9 0.6 - 1.1 mg/dL    GFR Non-African American >60 >60    GFR African American >60 >60    Calcium 9.2 8.3 - 10.6 mg/dL   POCT Venous   Result Value Ref Range    pH, Berto 7.463 (H) 7.350 - 7.450    pCO2, Berto 32.4 (L) 40.0 - 50.0 mm Hg    pO2, Berto 41 Not Established mm Hg    HCO3, Venous 23.2 23.0 - 29.0 mmol/L    Base Excess, Berto -1 -3 - 3    O2 Sat, Berto 80 Not Established %    TC02 (Calc), Berto 24 Not Established mmol/L    Lactate 0.74 0.40 - 2.00 mmol/L    Sample Type BERTO     Performed on SEE BELOW      RECENT VITALS:  BP: 112/75,Temp: 99.2 °F (37.3 °C), Pulse: 93, Resp: 20, SpO2: 94 %     Procedures N/A    ED Course     Nursing Notes, Past Medical Hx, Past Surgical Hx, Social Hx,Allergies, and Family Hx were reviewed. The patient was given the following medications:  Orders Placed This Encounter   Medications    HYDROmorphone (DILAUDID) injection 1 mg    ondansetron (ZOFRAN) injection 4 mg    0.9 % sodium chloride bolus       CONSULTS:  IP CONSULT TO UROLOGY    MEDICAL DECISIONMAKING / ASSESSMENT / Lacie Devi is a 50 y.o. female who presents to the emergency department complaining of left-sided back pain. Patient has a history of ureteral injury after hysterectomy 2 months ago and had a ureteral stent that was recently removed. Patient does have reproducible tenderness on exam.  She is afebrile. Laboratory studies are significant for a normal VBG and lactate. Renal panel shows creatinine is at her baseline. I discussed the case with urology and decision was made to do CT scan of the abdomen pelvis with IV contrast with delays in 30 minutes to evaluate for any persistent ureteral leaking. If this imaging is unremarkable and the patient's symptoms are controlled, the patient will be able to follow up with urology. These images are pending. At this time, care of the patient will be turned over to the oncoming provider who will complete the patient's workup and disposition pending results of testing and response to therapy. Clinical Impression     No diagnosis found. Disposition     PATIENT REFERRED TO:  No follow-up provider specified.     DISCHARGE MEDICATIONS:  New Prescriptions    No medications on file       DISPOSITION          Sallie Jones MD  03/21/19 0697

## 2019-03-21 NOTE — ED PROVIDER NOTES
Patient was turned over to me. Was awaiting CT results. CT obtained. Please see radiology report. Consulted urology [facility] to evaluate patient and are admitting patient secondary to ureteral obstruction.      Bishnu Dunlap MD  03/21/19 0685

## 2019-03-21 NOTE — ED TRIAGE NOTES
Patient is a 50year old female who presents to the ED from home with c/o lower back pain s/p left urethral stent removal yesterday. Patient is alert and oriented with even and unlabored respirations.

## 2019-03-21 NOTE — CONSULTS
Urology Attending Consult Note      Reason for Consultation: left flank pain, s/p left stent removal POD#1    History: 49 yo female with h/o hysterectomy in January with delayed left ureteral injury. She underwent cysto, left stent placement on 19. Now POD#1 s/p cysto, left stent stent removal. Op note pending. She presents with severe left flank pain. CT shows mild left hydroureteronephrosis with perinephric stranding. Cr WNL. Below is the last office note from Dr. Keyla Espinal on 19:    49 yo AA female s/p hysterctomy 19. Left ureteral injury - delayed. Cysto, left stent place. Pt having issues with bladder irritation, frequency and urgency. Pt reassured. Given samples of Myrbetriq 25 mg and 50 mg . will proceed with cysto, left ureteroscopy in 8-10 weeks. mgd    Family History, Social History, Review of Systems:  Reviewed and agreed to as per chart    Vitals:  /75   Pulse 93   Temp 99.2 °F (37.3 °C) (Oral)   Resp 20   Ht 5' 4\" (1.626 m)   Wt 220 lb (99.8 kg)   LMP 2018   SpO2 98%   BMI 37.76 kg/m²   Temp  Av.2 °F (37.3 °C)  Min: 99.2 °F (37.3 °C)  Max: 99.2 °F (37.3 °C)  No intake or output data in the 24 hours ending 19 1108      Physical:   Well developed, well nourished in no acute distress   Mood indicates no abnormalities. Pt doesnt appear depressed   Orientated to time and place   Neck is supple, trachea is midline   Respiratory effort is normal   Cardiovascular show no extremity swelling   Abdomen no masses or hernias are palpated, there is no tenderness. Liver and Spleen appear normal.   Skin show no abnormal lesions   Lymph nodes are not palpated in the inguinal, neck, or axillary area. Female :   -voiding clear urine.      Labs:  WBC:    Lab Results   Component Value Date    WBC 12.5 2019     Hemoglobin/Hematocrit:    Lab Results   Component Value Date    HGB 9.4 2019    HCT 30.4 2019     BMP:    Lab Results   Component Value Date     03/21/2019    K 4.3 03/21/2019    K 3.7 10/21/2018     03/21/2019    CO2 24 03/21/2019    BUN 7 03/21/2019    LABALBU 3.6 01/21/2019    CREATININE 0.9 03/21/2019    CALCIUM 9.2 03/21/2019    GFRAA >60 03/21/2019    LABGLOM >60 03/21/2019     PT/INR:    Lab Results   Component Value Date    PROTIME 11.6 01/21/2019    INR 1.02 01/21/2019     PTT:  No results found for: APTT[APTT        Antibiotic Therapy:  None     Imaging: CT abd/pelvis 3/21/19  1.   Minimal excretion into the left collecting system on delayed images.     Mild left hydroureteronephrosis and surrounding stranding as on the    study earlier today. Evern Stage may reflect distal ureteral obstruction    due to recently passed calculus, inflammatory changes/stricture. 2.  Small gas bubble in the urinary bladder.  DDX recent instrumentation,    infection. 3.  Left pelvic 3.6 cm low-density lesion, likely ovarian cyst.   4.  Other nonacute findings. Impression/Plan: 51 yo F with h/o left ureteral injury after hysterectomy in January. S/p left renal stent placement post-op day 10. Now POD#1 s/p cysto, left stent removal. She presents with severe left flank pain and mild left hydro on CT    -pain uncontrolled   -Cr wnl  -will start ditropan +/- Valium for ureteral spasms   -start Rocephin for possible pyelonephritis   -will monitor today. If pain continues to be uncontrolled, will ask IR to place Lt NT in AM  -npo after midnight just in case  -okay for diet today    Bessy Somers, APRN - CNP      ATTENDING  PT SEEN AND EXAMINED  AGREE WITH NP PLAN/NOTE  I SAW NO EVIDENCE OF LEAK ON CT. HOPEFULLY, THIS IS JUST URETERAL SPASMS  IF NO IMPROVEMENT, NEXT STEP WOULD BE LEFT NT PLACEMENT.

## 2019-03-21 NOTE — PROGRESS NOTES
Patient arrived to floor. A&O. Patient currently has a temp of 102.2. Tylenol given down in ED. Gave patient an ice pack for comfort. Patient currently in pain, medication given. Ativan given. Patient is resting at this time. /64   Pulse 98   Temp 102.2 °F (39 °C) (Oral)   Resp 24   Ht 5' 4\" (1.626 m)   Wt 220 lb (99.8 kg)   LMP 12/26/2018   SpO2 93%   BMI 37.76 kg/m²     Patient denies any further needs at this time.  Bed is in the lowest position and call light is within reach    Electronically signed by Nicole Patterson RN on 3/21/2019 at 5:38 PM

## 2019-03-22 ENCOUNTER — APPOINTMENT (OUTPATIENT)
Dept: GENERAL RADIOLOGY | Age: 49
DRG: 871 | End: 2019-03-22
Payer: COMMERCIAL

## 2019-03-22 PROBLEM — N99.89: Status: ACTIVE | Noted: 2019-03-22

## 2019-03-22 PROBLEM — N39.0 UTI (URINARY TRACT INFECTION): Status: ACTIVE | Noted: 2019-03-22

## 2019-03-22 LAB
ANION GAP SERPL CALCULATED.3IONS-SCNC: 9 MMOL/L (ref 3–16)
BUN BLDV-MCNC: 9 MG/DL (ref 7–20)
CALCIUM SERPL-MCNC: 8.7 MG/DL (ref 8.3–10.6)
CHLORIDE BLD-SCNC: 100 MMOL/L (ref 99–110)
CO2: 24 MMOL/L (ref 21–32)
CREAT SERPL-MCNC: 0.9 MG/DL (ref 0.6–1.1)
GFR AFRICAN AMERICAN: >60
GFR NON-AFRICAN AMERICAN: >60
GLUCOSE BLD-MCNC: 115 MG/DL (ref 70–99)
HCT VFR BLD CALC: 28.6 % (ref 36–48)
HEMOGLOBIN: 8.9 G/DL (ref 12–16)
MCH RBC QN AUTO: 21.2 PG (ref 26–34)
MCHC RBC AUTO-ENTMCNC: 31.3 G/DL (ref 31–36)
MCV RBC AUTO: 67.9 FL (ref 80–100)
PDW BLD-RTO: 21.5 % (ref 12.4–15.4)
PLATELET # BLD: 187 K/UL (ref 135–450)
PMV BLD AUTO: 8.4 FL (ref 5–10.5)
POTASSIUM SERPL-SCNC: 3.8 MMOL/L (ref 3.5–5.1)
RAPID INFLUENZA  B AGN: NEGATIVE
RAPID INFLUENZA A AGN: NEGATIVE
RBC # BLD: 4.21 M/UL (ref 4–5.2)
SODIUM BLD-SCNC: 133 MMOL/L (ref 136–145)
WBC # BLD: 8.4 K/UL (ref 4–11)

## 2019-03-22 PROCEDURE — 6370000000 HC RX 637 (ALT 250 FOR IP): Performed by: INTERNAL MEDICINE

## 2019-03-22 PROCEDURE — 2700000000 HC OXYGEN THERAPY PER DAY

## 2019-03-22 PROCEDURE — 87040 BLOOD CULTURE FOR BACTERIA: CPT

## 2019-03-22 PROCEDURE — 99222 1ST HOSP IP/OBS MODERATE 55: CPT | Performed by: INTERNAL MEDICINE

## 2019-03-22 PROCEDURE — 85027 COMPLETE CBC AUTOMATED: CPT

## 2019-03-22 PROCEDURE — 87086 URINE CULTURE/COLONY COUNT: CPT

## 2019-03-22 PROCEDURE — 6370000000 HC RX 637 (ALT 250 FOR IP): Performed by: NURSE PRACTITIONER

## 2019-03-22 PROCEDURE — 6360000002 HC RX W HCPCS: Performed by: UROLOGY

## 2019-03-22 PROCEDURE — 87103 BLOOD FUNGUS CULTURE: CPT

## 2019-03-22 PROCEDURE — 6360000002 HC RX W HCPCS: Performed by: INTERNAL MEDICINE

## 2019-03-22 PROCEDURE — 71045 X-RAY EXAM CHEST 1 VIEW: CPT

## 2019-03-22 PROCEDURE — 1200000000 HC SEMI PRIVATE

## 2019-03-22 PROCEDURE — 2580000003 HC RX 258: Performed by: INTERNAL MEDICINE

## 2019-03-22 PROCEDURE — 36415 COLL VENOUS BLD VENIPUNCTURE: CPT

## 2019-03-22 PROCEDURE — 80048 BASIC METABOLIC PNL TOTAL CA: CPT

## 2019-03-22 PROCEDURE — 93005 ELECTROCARDIOGRAM TRACING: CPT | Performed by: INTERNAL MEDICINE

## 2019-03-22 PROCEDURE — 89220 SPUTUM SPECIMEN COLLECTION: CPT

## 2019-03-22 PROCEDURE — 6360000002 HC RX W HCPCS: Performed by: NURSE PRACTITIONER

## 2019-03-22 PROCEDURE — 87804 INFLUENZA ASSAY W/OPTIC: CPT

## 2019-03-22 PROCEDURE — 94761 N-INVAS EAR/PLS OXIMETRY MLT: CPT

## 2019-03-22 RX ORDER — ACETAMINOPHEN 500 MG
1000 TABLET ORAL EVERY 6 HOURS PRN
Status: DISCONTINUED | OUTPATIENT
Start: 2019-03-22 | End: 2019-03-22

## 2019-03-22 RX ORDER — FLUCONAZOLE, SODIUM CHLORIDE 2 MG/ML
100 INJECTION INTRAVENOUS EVERY 24 HOURS
Status: DISCONTINUED | OUTPATIENT
Start: 2019-03-22 | End: 2019-03-25

## 2019-03-22 RX ORDER — SODIUM CHLORIDE 9 MG/ML
INJECTION, SOLUTION INTRAVENOUS CONTINUOUS
Status: DISCONTINUED | OUTPATIENT
Start: 2019-03-22 | End: 2019-03-25

## 2019-03-22 RX ORDER — NICOTINE 21 MG/24HR
1 PATCH, TRANSDERMAL 24 HOURS TRANSDERMAL DAILY
Status: DISCONTINUED | OUTPATIENT
Start: 2019-03-22 | End: 2019-03-26 | Stop reason: HOSPADM

## 2019-03-22 RX ORDER — LINEZOLID 600 MG/1
600 TABLET, FILM COATED ORAL EVERY 12 HOURS SCHEDULED
Status: DISCONTINUED | OUTPATIENT
Start: 2019-03-22 | End: 2019-03-25

## 2019-03-22 RX ORDER — ACETAMINOPHEN 500 MG
1000 TABLET ORAL EVERY 6 HOURS
Status: DISCONTINUED | OUTPATIENT
Start: 2019-03-22 | End: 2019-03-24

## 2019-03-22 RX ADMIN — OXYCODONE AND ACETAMINOPHEN 1 TABLET: 5; 325 TABLET ORAL at 02:33

## 2019-03-22 RX ADMIN — PIPERACILLIN SODIUM,TAZOBACTAM SODIUM 3.38 G: 3; .375 INJECTION, POWDER, FOR SOLUTION INTRAVENOUS at 16:36

## 2019-03-22 RX ADMIN — SODIUM CHLORIDE: 9 INJECTION, SOLUTION INTRAVENOUS at 15:11

## 2019-03-22 RX ADMIN — LINEZOLID 600 MG: 600 TABLET, FILM COATED ORAL at 21:22

## 2019-03-22 RX ADMIN — HYDROMORPHONE HYDROCHLORIDE 0.5 MG: 1 INJECTION, SOLUTION INTRAMUSCULAR; INTRAVENOUS; SUBCUTANEOUS at 08:10

## 2019-03-22 RX ADMIN — ACETAMINOPHEN 1000 MG: 500 TABLET, FILM COATED ORAL at 09:37

## 2019-03-22 RX ADMIN — LORAZEPAM 0.5 MG: 2 INJECTION INTRAMUSCULAR; INTRAVENOUS at 14:30

## 2019-03-22 RX ADMIN — ACETAMINOPHEN 1000 MG: 500 TABLET, FILM COATED ORAL at 21:22

## 2019-03-22 RX ADMIN — SODIUM CHLORIDE: 9 INJECTION, SOLUTION INTRAVENOUS at 23:22

## 2019-03-22 RX ADMIN — SODIUM CHLORIDE: 9 INJECTION, SOLUTION INTRAVENOUS at 09:37

## 2019-03-22 RX ADMIN — OXYCODONE AND ACETAMINOPHEN 2 TABLET: 5; 325 TABLET ORAL at 18:29

## 2019-03-22 RX ADMIN — ACETAMINOPHEN 1000 MG: 500 TABLET, FILM COATED ORAL at 14:47

## 2019-03-22 RX ADMIN — VANCOMYCIN HYDROCHLORIDE 1250 MG: 10 INJECTION, POWDER, LYOPHILIZED, FOR SOLUTION INTRAVENOUS at 15:11

## 2019-03-22 RX ADMIN — FLUCONAZOLE, SODIUM CHLORIDE 100 MG: 2 INJECTION INTRAVENOUS at 10:45

## 2019-03-22 RX ADMIN — PIPERACILLIN SODIUM,TAZOBACTAM SODIUM 3.38 G: 3; .375 INJECTION, POWDER, FOR SOLUTION INTRAVENOUS at 09:37

## 2019-03-22 ASSESSMENT — PAIN DESCRIPTION - LOCATION: LOCATION: ABDOMEN;BACK;THROAT

## 2019-03-22 ASSESSMENT — PAIN DESCRIPTION - PAIN TYPE: TYPE: ACUTE PAIN

## 2019-03-22 ASSESSMENT — PAIN SCALES - GENERAL
PAINLEVEL_OUTOF10: 6
PAINLEVEL_OUTOF10: 5
PAINLEVEL_OUTOF10: 7
PAINLEVEL_OUTOF10: 3
PAINLEVEL_OUTOF10: 0
PAINLEVEL_OUTOF10: 3
PAINLEVEL_OUTOF10: 5
PAINLEVEL_OUTOF10: 9

## 2019-03-22 NOTE — PLAN OF CARE
Problem: Pain:  Goal: Pain level will decrease  Description  Pain level will decrease  Note:   Patient is currently in a lot of pain in her abdomen, back and throat. IV dilaudid given at this time. Patient knows how to rate and describe pain.  Medication seems to help

## 2019-03-22 NOTE — CONSULTS
Infectious Diseases Inpatient Consult Note    Medical Student note - reviewed and modified, see Attending addendum at bottom    Reason for Consult:   UTI Antibiotic Management   Requesting Physician:   Dr. Chacko [facility]   Primary Care Physician:  Mikayla MACEDOSALAZAR RD  History Obtained From:   Pt, EPIC    Admit Date: 3/21/2019  Hospital Day: 2    CHIEF COMPLAINT:       Chief Complaint   Patient presents with    Post-op Problem       HISTORY OF PRESENT ILLNESS:      51 yo F presented w/ L flank and back pain. 2019 Pt underwent hysterectomy that was complicated with adhesions and lysis resulted in delayed left ureteral injury. Patient experienced irritation, frequency and urgency and was given Myrbetriq 25mg and 50mg    2019 Pt underwent cystoscopy and had ureter stent placed     2019 Ureter stent removed. Patient experienced severe left flank pain w/ spasms and CT showed mild left hydronephrosis. Patient febrile with T 102.2. Started on Vanc/ Zosyn for broad spectrum coverage     Past Medical History:    Past Medical History:   Diagnosis Date    Anxiety     Depression     Graves disease     Graves disease     Graves' disease     Graves' disease     History of blood transfusion     Hyperlipidemia     Rapid heart beat     Thyroid disease        Past Surgical History:    Past Surgical History:   Procedure Laterality Date     SECTION      COLONOSCOPY  10/22/2018    COLONOSCOPY WITH BIOPSY performed by Emerita Huitron MD at [address] [facility]  10/22/2018    COLONOSCOPY POLYPECTOMY ABLATION performed by Emerita Huitron MD at [address] [place] [address] / [facility] Left 2019    CYSTOSCOPY BILATERAL  RETROGRADE PYELOGRAM LEFT URETEROSCOPY LEFT URETERAL STENT INSERTION performed by Brian Thacker MD at /[facility]      partial hysterectomy.     ID EGD TRANSORAL BIOPSY SINGLE/MULTIPLE N/A 10/22/2018    EGD ESOPHAGOGASTRODUODENOSCOPY performed by Ricarda Rivas MD at [facility] Bilateral 1/11/2019    SUPRACERVICAL HYSTERECTOMY WITH BILATERAL SALPINGECTOMY performed by Janell Ayers MD at [place] [address]       Current Medications:     fluconazole  100 mg Intravenous Q24H    piperacillin-tazobactam  3.375 g Intravenous Q8H    acetaminophen  1,000 mg Oral Q6H    vancomycin  1,250 mg Intravenous Q12H    oxybutynin  10 mg Oral Daily       Allergies:  Codeine; Robaxin [methocarbamol]; and Diazepam    Social History:    TOBACCO:    Cigarettes  1/2 pack/d  ETOH:                None   DRUGS:   Unknown     Patient lives at home    Family History:   No immunodeficiency    REVIEW OF SYSTEMS:    No fever / chills / sweats. No weight loss. No visual change, eye pain, eye discharge. No oral lesion, sore throat, dysphagia. Denies cough / sputum. Denies chest pain, palpitations. Denies n / v / abd pain. No diarrhea. Denies dysuria or change in urinary function. Denies joint swelling or pain. No myalgia, arthralgia. Denies skin changes, itching  Denies focal weakness, sensory change or other neurologic symptom    Denies new / worse depression, psychiatric symptoms  Denies any Endocrine or Hematologic symptoms    PHYSICAL EXAM:      Vitals:    /65   Pulse 81   Temp 98.4 °F (36.9 °C) (Oral)   Resp 20   Ht 5' 4\" (1.626 m)   Wt 220 lb (99.8 kg)   LMP 12/26/2018   SpO2 100%   BMI 37.76 kg/m²     GENERAL: No apparent distress.     HEENT: Membranes moist, no oral lesion  NECK:  Supple  LUNGS: Clear b/l, no rales, no dullness  CARDIAC: RRR, no murmur appreciated  ABD:  + BS, soft / NT  EXT:  No rash, no edema, no lesions  NEURO: No focal neurologic findings  PSYCH: Orientation, sensorium, mood normal  LINES:  Peripheral iv    DATA:    Lab Results   Component Value Date    WBC 8.4 03/22/2019    HGB 8.9 (L) 03/22/2019    HCT 28.6 (L) 03/22/2019    MCV 67.9 (L) 03/22/2019     03/22/2019 Lab Results   Component Value Date    CREATININE 0.9 03/22/2019    BUN 9 03/22/2019     (L) 03/22/2019    K 3.8 03/22/2019     03/22/2019    CO2 24 03/22/2019       Hepatic Function Panel:   Lab Results   Component Value Date    ALKPHOS 83 01/21/2019    ALT 16 01/21/2019    AST 15 01/21/2019    PROT 7.2 01/21/2019    BILITOT 0.3 01/21/2019    BILIDIR <0.2 10/21/2018    IBILI see below 10/21/2018    LABALBU 3.6 01/21/2019       Micro:  2/8/2019 Urine Cx grew Candida ablicans   52/07/0045 Blood cx, fungal cx - In process   03/21/2019 Rapid Flu Ag A/B Negative     Imaging:     Chest Xray 03/22/2019   Bibasilar atelectasis and/or patchy pneumonias. CT ABD-Pelvis WO Contrast  Resulted: 03/21/19 0818   1.   Minimal excretion into the left collecting system on delayed images.    Mild left hydroureteronephrosis and surrounding stranding as on the   study earlier today.  Findings may reflect distal ureteral obstruction   due to recently passed calculus, inflammatory changes/stricture. 2.  Small gas bubble in the urinary bladder.  DDX recent instrumentation,   infection. 3.  Left pelvic 3.6 cm low-density lesion, likely ovarian cyst.  4.  Other nonacute findings. CT Abdomen Pelvis W IV Contrast Resulted: 03/21/19 0738     1.  Interval resolution of the pelvic fluid collection. 2.  New 3.6 cm low-density lesion in the left anterior pelvis, likely   left ovarian cyst.  3.  Partial hysterectomy as on prior. 4.  Mild left hydroureteronephrosis, urothelial enhancement, mildly   increased.  Findings may represent distal UVJ obstruction due to recently   passed stone, distal ureteral inflammatory changes/stricture, or findings   may reflect infection.  Consider follow-up/further workup. 5.  Small gas bubble in the urinary bladder.  May relate to recent   procedure or infection.   6.  Stable 1.7 cm left adrenal nodule.         IMPRESSION:       48yo F w/ hx of hysterectomy & lysis of adhesions in January

## 2019-03-22 NOTE — CARE COORDINATION
3/22/2019  [facility] ([facility])  Clinical Case Management Department  Spoke with pt about DC plan, lives at home with mother and daughter and son denies needs, family will drive home, fills meds at [facility]. Patient: Gilles Drake  MRN: [id number] / : 1970  ACCT: [de-identified]          Admission Documentation  Attending Provider: Shahla Bolton MD  Admit date/time: 3/21/2019  4:33 AM  Status: Observation [104]  Diagnosis: Hydronephrosis     Readmission within last 30 days:  yes     Living Situation  Discharge Planning  Living Arrangements: Alone  Support Systems: None  Potential Assistance Needed: N/A  Type of Home Care Services: None  Patient expects to be discharged to[de-identified] home  Expected Discharge Date: 19    Service Assessment       Values / Beliefs  Do you have any ethnic, cultural, sacramental, or spiritual Christian needs you would like us to be aware of while you are in the hospital?: No    Advance Directives (For Healthcare)  Pre-existing DNR Comfort Care/DNR Arrest/DNI Order: No  Healthcare Directive: No, patient does not have an advance directive for healthcare treatment  Information on Healthcare Directives Requested: No  Patient Requests Assistance: No  Advance Directives: Pt. not interested at this time                        Destination  home with family    [address] [facility]   none    Home Health/Skilled Nursing  Services at Discharge: None  Home care at home?  No     Therapy Consults  PT evaluation needed?: No  OT Evalulation Needed?: No  SLP evaluation needed?: No    [address] with family no needs   Pharmacy: [facility]   Potential Assistance Purchasing Medications:  No  Does patient want to participate in local refill/meds to beds program?: No    Goals of Care  Patient expects to be discharged to[de-identified] home  Patient plans for SNF: NA         Mode of transport from hospital: private car with family     Factors facilitating achievement of predicted outcomes: Family support, Cooperative and Pleasant    Barriers to discharge: pain       Miguelina Bernabe RN  [facility]  Case Management Department  Ph: [phone] Fax: [phone]

## 2019-03-22 NOTE — CONSULTS
Hospitalist Consultation Note    Patient's PCP: Dr. Holly Dubose RD     Requesting Physician: Dr. Amadou West MD    Reason for Consultation: medical management    HISTORY OF PRESENT ILLNESS:    This is a very pleasant 50 y.o. female with a history of multiple medical problems presenting for cysto consulted for medical management including sepsis, discussed with urology today, changed to inpatient as patient will be staying here greater than 2 midnights. The patient is POD 10 s/p renal stent placement and is POD 1 s/p cysto and removal of stent, she had an injury to a ureter after surgery. She currently has a c/o abdominal discomfort and left flank pain she states the pain was 8/10 earlier received dilaudid which helped the pain its down to 2/10 on my exam.  She states she has had cough as well she states that she is not hungry she is slightly nauseated but has not vomited, she states that she is however hungry for food. Denies light headedness or dizziness. No other complaints at this time. Past Medical / Surgical History:    Past Medical History:   Diagnosis Date    Anxiety     Depression     Graves disease     Graves disease     Graves' disease     Graves' disease     History of blood transfusion     Hyperlipidemia     Rapid heart beat     Thyroid disease        Past Surgical History:   Procedure Laterality Date     SECTION      COLONOSCOPY  10/22/2018    COLONOSCOPY WITH BIOPSY performed by Salty Soliman MD at [facility]  10/22/2018    COLONOSCOPY POLYPECTOMY ABLATION performed by Salty Soliman MD at [address] [place] [address] / [address] [place] [address] Rd / Arthur Cook Left 2019    CYSTOSCOPY BILATERAL  RETROGRADE PYELOGRAM LEFT URETEROSCOPY LEFT URETERAL STENT INSERTION performed by Jessenia Rogel MD at [address]      partial hysterectomy.     NE EGD TRANSORAL BIOPSY SINGLE/MULTIPLE N/A 10/22/2018 EGD ESOPHAGOGASTRODUODENOSCOPY performed by Veena Vee MD at [address] Bilateral 1/11/2019    SUPRACERVICAL HYSTERECTOMY WITH BILATERAL SALPINGECTOMY performed by Prem Valenzuela MD at [address]       Medications Prior to Admission:    No current facility-administered medications on file prior to encounter. Current Outpatient Medications on File Prior to Encounter   Medication Sig Dispense Refill    ondansetron (ZOFRAN-ODT) 4 MG disintegrating tablet Take 1 tablet by mouth every 8 hours as needed for Nausea 25 tablet 0    phenazopyridine (PYRIDIUM) 200 MG tablet Take one pill po bid prn pain 30 tablet 0    fluconazole (DIFLUCAN) 200 MG tablet Take one tablet po qd 14 tablet 0    phenazopyridine (PYRIDIUM) 200 MG tablet Take one tablet po bid prn painful urination 30 tablet 0    bisacodyl (BISAC-EVAC) 10 MG suppository Place one suppository per rectum qd as needed for constipation 4 suppository 0    ibuprofen (ADVIL;MOTRIN) 600 MG tablet Take 600 mg by mouth 3 times daily (with meals)      propranolol (INDERAL) 10 MG tablet Take 10 mg by mouth 2 times daily as needed      ferrous sulfate dried (SLOW IRON) 160 (50 Fe) MG TBCR extended release tablet Take 160 mg by mouth daily 30 tablet 1       Allergies:  Codeine; Robaxin [methocarbamol]; and Diazepam    Social History:   TOBACCO:   reports that she has been smoking cigarettes. She has been smoking about 0.50 packs per day. She has never used smokeless tobacco.     ETOH:   reports that she does not drink alcohol. Patient currently lives at home    Family History:       Problem Relation Age of Onset    Breast Cancer Maternal Aunt     Hypertension Maternal Uncle        ROS: Review of Systems - Negative except congestion, flank pain. All other systems reviewed and are negative.     PHYSICAL EXAM:  BP (!) 103/59   Pulse 109   Temp 101.1 °F (38.4 °C) (Oral)   Resp 22   Ht 5' 4\" (1.626 m)   Wt 220 lb (99.8 kg)   LMP 12/26/2018   SpO2 97%   BMI 37.76 kg/m²     No results for input(s): POCGLU in the last 72 hours. BP (!) 103/59   Pulse 109   Temp 101.1 °F (38.4 °C) (Oral)   Resp 22   Ht 5' 4\" (1.626 m)   Wt 220 lb (99.8 kg)   LMP 12/26/2018   SpO2 97%   BMI 37.76 kg/m²   General appearance: alert, appears stated age and cooperative  Head: Normocephalic, without obvious abnormality, atraumatic  Neck: mild anterior cervical adenopathy, no carotid bruit, no JVD, supple, symmetrical, trachea midline and thyroid not enlarged, symmetric, no tenderness/mass/nodules  Lungs: clear to auscultation bilaterally but decreased air entry at bases some crackles  Heart: S1, S2 normal sinus tachycardia  Abdomen: soft non distended tender in left flank bs normal active  Extremities: extremities normal, atraumatic, no cyanosis or edema  Pulses: 2+ and symmetric  Skin: Skin color, texture, turgor normal. No rashes or lesions    LABS:  Recent Labs     03/21/19  0517 03/22/19  0724   WBC 12.5* 8.4   HGB 9.4* 8.9*   HCT 30.4* 28.6*    187                                                                  Recent Labs     03/21/19  0517 03/22/19  0724   * 133*   K 4.3 3.8    100   CO2 24 24   BUN 7 9   CREATININE 0.9 0.9   GLUCOSE 118* 115*     No results for input(s): AST, ALT, ALB, BILITOT, ALKPHOS in the last 72 hours. No results for input(s): TROPONINI in the last 72 hours. No results for input(s): BNP in the last 72 hours. No results for input(s): CHOL, HDL in the last 72 hours. Invalid input(s): LDLCALCU  No results for input(s): INR in the last 72 hours.   Recent Labs     03/21/19  0517   COLORU Yellow   PHUR 6.0   WBCUA 10-20*   RBCUA 5-10*   YEAST Present*   BACTERIA 3+*   CLARITYU Clear   SPECGRAV 1.010   LEUKOCYTESUR MODERATE*   UROBILINOGEN 0.2   BILIRUBINUR Negative   BLOODU LARGE*   GLUCOSEU Negative        EKG Reviewed: ordered reviewed pac's, sinus tach, no ischemia noted  Chest x ray ordered and

## 2019-03-22 NOTE — PROGRESS NOTES
Patient is A&O. VSS. Temp is better this AM. Patient has severe pain at this time. Put on oxygen this AM, patient unable to take deep breaths d/t pain. /73   Pulse 112   Temp 99.7 °F (37.6 °C) (Oral)   Resp 22   Ht 5' 4\" (1.626 m)   Wt 220 lb (99.8 kg)   LMP 12/26/2018   SpO2 94%   BMI 37.76 kg/m²     Patient denies any further needs at this time.  Bed is in the lowest position and call light is within reach    Electronically signed by Amrit Mcginnis RN on 3/22/2019 at 8:16 AM

## 2019-03-22 NOTE — CONSULTS
Clinical Pharmacy Progress Note    Admit date: 3/21/2019     Subjective/Objective:  Pt is a 52yof with PMHx that includes Graves disease, HLD, depression, and smoking who had renal stent placement 1/22/19 and cysto with stent removal 3/21/19 who presented with worsening flank pain on POD#1. Pt is being treated for sepsis 2/2 UTI / acute pyelonephritis vs possible intraabdominal abscess. Pharmacy is consulted to dose Vancomycin per Dr. Ke Walker    Current antibiotics:   (3/21 x1)  Fluconazole 100mg IV q24h - day #1  Zosyn 3.375g IV EI q8h - day #1  Vancomycin - Pharmacy to dose - day #1   1.25g IV q12h (3/22 - current)    Recent Labs     03/21/19  0517 03/22/19  0724   * 133*   K 4.3 3.8    100   CO2 24 24   BUN 7 9   CREATININE 0.9 0.9   GLUCOSE 118* 115*       Estimated Creatinine Clearance: 88 mL/min (based on SCr of 0.9 mg/dL). Lab Results   Component Value Date    WBC 8.4 03/22/2019    HGB 8.9 (L) 03/22/2019    HCT 28.6 (L) 03/22/2019    MCV 67.9 (L) 03/22/2019     03/22/2019       Lab Results   Component Value Date    PROTIME 11.6 01/21/2019    INR 1.02 01/21/2019         Culture results:  Rapid flu (3/22) = negative  Blood (3/22) = pending  Urine (3/22) = pending    Prophylaxis:  VTE:  On hold in case procedure is needed  GI:  Not indicated    Vaccination screening:  Pneumonia:  Not indicated  Influenza:  Up to date    Assessment/Plan:  1)  Sepsis 2/2 UTI / acute pyelonephritis vs possible intraabdominal abscess:  Fluconazole + Zosyn + Vancomycin - day #1  · Vancomycin - Pharmacy to dose  · Based on estimated kinetics, will start 1.25g IV q12h. · Clinical condition will be monitored closely, and levels will be ordered & dose adjustments made as appropriate.     Please call with questions--  Thanks--  Isi Spring, PharmD, [facility], [address] or [phone] (wireless)  3/22/2019 2:57 PM

## 2019-03-23 PROCEDURE — 6370000000 HC RX 637 (ALT 250 FOR IP): Performed by: INTERNAL MEDICINE

## 2019-03-23 PROCEDURE — 2580000003 HC RX 258: Performed by: INTERNAL MEDICINE

## 2019-03-23 PROCEDURE — 6360000002 HC RX W HCPCS: Performed by: INTERNAL MEDICINE

## 2019-03-23 PROCEDURE — 6370000000 HC RX 637 (ALT 250 FOR IP): Performed by: NURSE PRACTITIONER

## 2019-03-23 PROCEDURE — 1200000000 HC SEMI PRIVATE

## 2019-03-23 PROCEDURE — 6360000002 HC RX W HCPCS: Performed by: UROLOGY

## 2019-03-23 RX ORDER — CALCIUM CARBONATE 200(500)MG
500 TABLET,CHEWABLE ORAL 3 TIMES DAILY PRN
Status: DISCONTINUED | OUTPATIENT
Start: 2019-03-23 | End: 2019-03-26 | Stop reason: HOSPADM

## 2019-03-23 RX ADMIN — LORAZEPAM 0.5 MG: 2 INJECTION INTRAMUSCULAR; INTRAVENOUS at 10:35

## 2019-03-23 RX ADMIN — HYDROMORPHONE HYDROCHLORIDE 1 MG: 1 INJECTION, SOLUTION INTRAMUSCULAR; INTRAVENOUS; SUBCUTANEOUS at 10:35

## 2019-03-23 RX ADMIN — OXYBUTYNIN CHLORIDE 10 MG: 5 TABLET, FILM COATED, EXTENDED RELEASE ORAL at 06:29

## 2019-03-23 RX ADMIN — ACETAMINOPHEN 1000 MG: 500 TABLET, FILM COATED ORAL at 14:53

## 2019-03-23 RX ADMIN — FLUCONAZOLE, SODIUM CHLORIDE 100 MG: 2 INJECTION INTRAVENOUS at 08:24

## 2019-03-23 RX ADMIN — HYDROMORPHONE HYDROCHLORIDE 1 MG: 1 INJECTION, SOLUTION INTRAMUSCULAR; INTRAVENOUS; SUBCUTANEOUS at 20:14

## 2019-03-23 RX ADMIN — LINEZOLID 600 MG: 600 TABLET, FILM COATED ORAL at 20:14

## 2019-03-23 RX ADMIN — PIPERACILLIN SODIUM,TAZOBACTAM SODIUM 3.38 G: 3; .375 INJECTION, POWDER, FOR SOLUTION INTRAVENOUS at 17:00

## 2019-03-23 RX ADMIN — PIPERACILLIN SODIUM,TAZOBACTAM SODIUM 3.38 G: 3; .375 INJECTION, POWDER, FOR SOLUTION INTRAVENOUS at 08:31

## 2019-03-23 RX ADMIN — LORAZEPAM 0.5 MG: 2 INJECTION INTRAMUSCULAR; INTRAVENOUS at 04:44

## 2019-03-23 RX ADMIN — PIPERACILLIN SODIUM,TAZOBACTAM SODIUM 3.38 G: 3; .375 INJECTION, POWDER, FOR SOLUTION INTRAVENOUS at 02:28

## 2019-03-23 RX ADMIN — LINEZOLID 600 MG: 600 TABLET, FILM COATED ORAL at 08:24

## 2019-03-23 RX ADMIN — ACETAMINOPHEN 1000 MG: 500 TABLET, FILM COATED ORAL at 20:13

## 2019-03-23 RX ADMIN — LORAZEPAM 0.5 MG: 2 INJECTION INTRAMUSCULAR; INTRAVENOUS at 20:14

## 2019-03-23 ASSESSMENT — PAIN DESCRIPTION - DESCRIPTORS: DESCRIPTORS: HEADACHE

## 2019-03-23 ASSESSMENT — PAIN SCALES - GENERAL
PAINLEVEL_OUTOF10: 3
PAINLEVEL_OUTOF10: 0
PAINLEVEL_OUTOF10: 0
PAINLEVEL_OUTOF10: 8
PAINLEVEL_OUTOF10: 10
PAINLEVEL_OUTOF10: 7

## 2019-03-23 ASSESSMENT — PAIN DESCRIPTION - LOCATION: LOCATION: FLANK;HEAD

## 2019-03-23 ASSESSMENT — PAIN DESCRIPTION - ONSET: ONSET: ON-GOING

## 2019-03-23 ASSESSMENT — PAIN DESCRIPTION - PROGRESSION: CLINICAL_PROGRESSION: NOT CHANGED

## 2019-03-23 ASSESSMENT — PAIN DESCRIPTION - PAIN TYPE
TYPE: ACUTE PAIN
TYPE: ACUTE PAIN

## 2019-03-23 ASSESSMENT — PAIN DESCRIPTION - FREQUENCY: FREQUENCY: INTERMITTENT

## 2019-03-23 ASSESSMENT — PAIN DESCRIPTION - ORIENTATION: ORIENTATION: RIGHT

## 2019-03-23 NOTE — PROGRESS NOTES
Patient is A&O. VSS. Patient complains of generalized pain, as well as throat pain. Tylenol to follow. Patient is NPO at this time. Antibiotics hanging at this time. Lungs sound diminished but clear this AM.     /74   Pulse 85   Temp 98.7 °F (37.1 °C) (Oral)   Resp 20   Ht 5' 4\" (1.626 m)   Wt 220 lb (99.8 kg)   LMP 12/26/2018   SpO2 96%   BMI 37.76 kg/m²     Patient denies any further needs at this time.  Bed is in the lowest position and call light is within reach    Electronically signed by Anne Woodard RN on 3/23/2019 at 8:35 AM

## 2019-03-23 NOTE — PLAN OF CARE
Problem: Urinary Elimination:  Goal: Signs and symptoms of infection will decrease  Description  Signs and symptoms of infection will decrease  Outcome: Ongoing  Note:   Patient is receiving antibitoics for yeast infection. Patient is having left flank pain at this time.  Will continue to monitor

## 2019-03-23 NOTE — PROGRESS NOTES
Pt alert and oriented, VSS, afebrile this shift. Pt c/o intermittent spasms in flank area, PRN ativan given w benefit. NPO since MN for possible urology procedure. Encouraging cough/deep breathing. UAL w steady gate. Pt now resting in bed, call light within reach.

## 2019-03-23 NOTE — PROGRESS NOTES
Hospitalist Progress Note      PCP: [facility] [address]    Date of Admission: 3/21/2019    Chief Complaint on Admission: fevers and left sided flank pain    Pt Seen/Examined and Chart Reviewed. Admitting dx as above    SUBJECTIVE/ OBJECTIVE:   Patient reports sore throat, and feels ill, pain in abdomen better with narcotics. She is urinating. No diarrhea. She was given ativan earlier and now feels foggy. Tmax 102.6    Allergies  Codeine; Robaxin [methocarbamol]; and Diazepam    Medications      Scheduled Meds:   fluconazole  100 mg Intravenous Q24H    piperacillin-tazobactam  3.375 g Intravenous Q8H    acetaminophen  1,000 mg Oral Q6H    linezolid  600 mg Oral 2 times per day    nicotine  1 patch Transdermal Daily    oxybutynin  10 mg Oral Daily       Infusions:   sodium chloride 125 mL/hr at 19 2322       PRN Meds:  HYDROmorphone **OR** HYDROmorphone, oxyCODONE-acetaminophen, oxyCODONE-acetaminophen, LORazepam    Vitals    TEMPERATURE:  Current - Temp: 102.6 °F (39.2 °C); Max - Temp  Av.3 °F (37.4 °C)  Min: 97.6 °F (36.4 °C)  Max: 102.6 °F (39.2 °C)  RESPIRATIONS RANGE: Resp  Av.1  Min: 16  Max: 22  PULSE RANGE: Pulse  Av.8  Min: 85  Max: 114  BLOOD PRESSURE RANGE:  Systolic (64JBK), HVT:282 , Min:99 , UQY:269   ; Diastolic (05WUV), KXM:91, Min:62, Max:84    PULSE OXIMETRY RANGE: SpO2  Av.1 %  Min: 89 %  Max: 99 %  24HR INTAKE/OUTPUT:      Intake/Output Summary (Last 24 hours) at 3/23/2019 1500  Last data filed at 3/23/2019 1448  Gross per 24 hour   Intake 2380 ml   Output 1450 ml   Net 930 ml       Exam:      General appearance: mild distress, appears stated age and cooperative. HEENT oral thrush is present  Neck: Supple, No jugular venous distention/bruits.    Lungs: diminished, without crackles or wheezing  Heart: Regular rate and rhythm with Normal S1/S2 without  murmurs, rubs or gallops, point of maximum impulse non-displaced  Abdomen: Soft, non-tender or non-distended without rigidity or guarding and positive bowel sounds all four quadrants. Extremities: No clubbing, cyanosis, or edema bilaterally. Skin: Skin color, texture, turgor normal.    Neurologic: Alert and oriented X 3,   grossly non-focal.  Mental status: Alert, oriented, thought content appropriate. Data    Recent Labs     03/21/19  0517 03/22/19  0724   WBC 12.5* 8.4   HGB 9.4* 8.9*   HCT 30.4* 28.6*    187      Recent Labs     03/21/19  0517 03/22/19  0724   * 133*   K 4.3 3.8    100   CO2 24 24   BUN 7 9   CREATININE 0.9 0.9     No results for input(s): AST, ALT, ALB, BILIDIR, BILITOT, ALKPHOS in the last 72 hours. No results for input(s): INR in the last 72 hours. No results for input(s): CKTOTAL, CKMB, CKMBINDEX, TROPONINI in the last 72 hours. Consults:     IP CONSULT TO UROLOGY  IP CONSULT TO UROLOGY  IP CONSULT TO UROLOGY  IP CONSULT TO HOSPITALIST  IP CONSULT TO PHARMACY  IP CONSULT TO INFECTIOUS DISEASES    Active Hospital Problems    Diagnosis Date Noted    Complicated UTI (urinary tract infection) [N39.0] 03/22/2019    Urological system complication of procedure [N99.89] 03/22/2019    Flank pain, acute [R10.9] 03/21/2019    Hydronephrosis [N13.30] 03/21/2019         ASSESSMENT AND PLAN      Sepsis, POA  several sources of infection- thrush, pneumonia, UTI. Cont with broad spectrum antibiotics, ID following, on diflucan, zyvox and zosyn. Blood and urine cx NGTD. Rapid flu negative  Cont with IVF    Left sided mild hydronephrosis, recent ureteral stent removal:  Urology is following. Creatinine was stable. Will recheck in am. No plans for urological intervention today.      DVT Prophylaxis: SCD  Diet: DIET GENERAL;  Code Status: Full Code    PT/OT Eval Status:at baseline    Dispo - inpt    Emery Corcoran MD

## 2019-03-23 NOTE — PROGRESS NOTES
Urology Attending Progress Note      Subjective: patient reports flank pain slightly less but does report sob; slight nausea    Vitals:  /77   Pulse 93   Temp 99.8 °F (37.7 °C) (Oral)   Resp 22   Ht 5' 4\" (1.626 m)   Wt 220 lb (99.8 kg)   LMP 2018   SpO2 97%   BMI 37.76 kg/m²   Temp  Av.6 °F (37 °C)  Min: 97.6 °F (36.4 °C)  Max: 99.8 °F (37.7 °C)    Intake/Output Summary (Last 24 hours) at 3/23/2019 1336  Last data filed at 3/23/2019 2221  Gross per 24 hour   Intake 1560 ml   Output 1000 ml   Net 560 ml       Exam: abdomen - soft, mild distension    Labs:  WBC:    Lab Results   Component Value Date    WBC 8.4 2019     Hemoglobin/Hematocrit:    Lab Results   Component Value Date    HGB 8.9 2019    HCT 28.6 2019     BMP:    Lab Results   Component Value Date     2019    K 3.8 2019    K 3.7 10/21/2018     2019    CO2 24 2019    BUN 9 2019    LABALBU 3.6 2019    CREATININE 0.9 2019    CALCIUM 8.7 2019    GFRAA >60 2019    LABGLOM >60 2019     PT/INR:    Lab Results   Component Value Date    PROTIME 11.6 2019    INR 1.02 2019     PTT:  No results found for: APTT[APTT    Urinalysis:     Urine Culture:  Negative thus far    Blood Culture:      Antibiotic Therapy:  Zosyn, diflucan    Imaging:            Impression/Plan: 49 yo F with h/o left ureteral injury after hysterectomy in January. S/p left renal stent placement post-op day 10.  Now POD#2 s/p cysto, left stent removal. She presents with severe left flank pain and mild left hydro on CT     - pain slightly improved   - abx per IM for pneumonia    Glenroy Reyes MD

## 2019-03-24 LAB
ANION GAP SERPL CALCULATED.3IONS-SCNC: 11 MMOL/L (ref 3–16)
BUN BLDV-MCNC: 5 MG/DL (ref 7–20)
CALCIUM SERPL-MCNC: 8.6 MG/DL (ref 8.3–10.6)
CHLORIDE BLD-SCNC: 105 MMOL/L (ref 99–110)
CO2: 23 MMOL/L (ref 21–32)
CREAT SERPL-MCNC: 0.6 MG/DL (ref 0.6–1.1)
GFR AFRICAN AMERICAN: >60
GFR NON-AFRICAN AMERICAN: >60
GLUCOSE BLD-MCNC: 100 MG/DL (ref 70–99)
ORGANISM: ABNORMAL
POTASSIUM SERPL-SCNC: 3.6 MMOL/L (ref 3.5–5.1)
SODIUM BLD-SCNC: 139 MMOL/L (ref 136–145)
URINE CULTURE, ROUTINE: ABNORMAL
URINE CULTURE, ROUTINE: ABNORMAL

## 2019-03-24 PROCEDURE — 2580000003 HC RX 258: Performed by: INTERNAL MEDICINE

## 2019-03-24 PROCEDURE — 6360000002 HC RX W HCPCS: Performed by: INTERNAL MEDICINE

## 2019-03-24 PROCEDURE — 6370000000 HC RX 637 (ALT 250 FOR IP): Performed by: INTERNAL MEDICINE

## 2019-03-24 PROCEDURE — 36415 COLL VENOUS BLD VENIPUNCTURE: CPT

## 2019-03-24 PROCEDURE — 1200000000 HC SEMI PRIVATE

## 2019-03-24 PROCEDURE — 6360000002 HC RX W HCPCS: Performed by: UROLOGY

## 2019-03-24 PROCEDURE — 6370000000 HC RX 637 (ALT 250 FOR IP): Performed by: NURSE PRACTITIONER

## 2019-03-24 PROCEDURE — 80048 BASIC METABOLIC PNL TOTAL CA: CPT

## 2019-03-24 RX ORDER — MAGNESIUM HYDROXIDE/ALUMINUM HYDROXICE/SIMETHICONE 120; 1200; 1200 MG/30ML; MG/30ML; MG/30ML
30 SUSPENSION ORAL EVERY 6 HOURS PRN
Status: DISCONTINUED | OUTPATIENT
Start: 2019-03-24 | End: 2019-03-26 | Stop reason: HOSPADM

## 2019-03-24 RX ORDER — ACETAMINOPHEN 325 MG/1
650 TABLET ORAL EVERY 6 HOURS PRN
Status: DISCONTINUED | OUTPATIENT
Start: 2019-03-24 | End: 2019-03-26 | Stop reason: HOSPADM

## 2019-03-24 RX ORDER — SIMETHICONE 80 MG
80 TABLET,CHEWABLE ORAL EVERY 6 HOURS PRN
Status: DISCONTINUED | OUTPATIENT
Start: 2019-03-24 | End: 2019-03-26 | Stop reason: HOSPADM

## 2019-03-24 RX ADMIN — ACETAMINOPHEN 1000 MG: 500 TABLET, FILM COATED ORAL at 02:58

## 2019-03-24 RX ADMIN — SODIUM CHLORIDE: 9 INJECTION, SOLUTION INTRAVENOUS at 20:28

## 2019-03-24 RX ADMIN — SODIUM CHLORIDE: 9 INJECTION, SOLUTION INTRAVENOUS at 02:58

## 2019-03-24 RX ADMIN — LINEZOLID 600 MG: 600 TABLET, FILM COATED ORAL at 08:47

## 2019-03-24 RX ADMIN — LINEZOLID 600 MG: 600 TABLET, FILM COATED ORAL at 20:21

## 2019-03-24 RX ADMIN — SIMETHICONE CHEW TAB 80 MG 80 MG: 80 TABLET ORAL at 20:21

## 2019-03-24 RX ADMIN — FLUCONAZOLE, SODIUM CHLORIDE 100 MG: 2 INJECTION INTRAVENOUS at 08:47

## 2019-03-24 RX ADMIN — ACETAMINOPHEN 650 MG: 325 TABLET ORAL at 15:43

## 2019-03-24 RX ADMIN — PIPERACILLIN SODIUM,TAZOBACTAM SODIUM 3.38 G: 3; .375 INJECTION, POWDER, FOR SOLUTION INTRAVENOUS at 08:47

## 2019-03-24 RX ADMIN — HYDROMORPHONE HYDROCHLORIDE 0.5 MG: 1 INJECTION, SOLUTION INTRAMUSCULAR; INTRAVENOUS; SUBCUTANEOUS at 20:21

## 2019-03-24 RX ADMIN — ENOXAPARIN SODIUM 40 MG: 40 INJECTION SUBCUTANEOUS at 11:11

## 2019-03-24 RX ADMIN — PIPERACILLIN SODIUM,TAZOBACTAM SODIUM 3.38 G: 3; .375 INJECTION, POWDER, FOR SOLUTION INTRAVENOUS at 00:34

## 2019-03-24 RX ADMIN — LORAZEPAM 0.5 MG: 2 INJECTION INTRAMUSCULAR; INTRAVENOUS at 15:43

## 2019-03-24 RX ADMIN — ALUMINUM HYDROXIDE, MAGNESIUM HYDROXIDE, AND SIMETHICONE 30 ML: 200; 200; 20 SUSPENSION ORAL at 04:36

## 2019-03-24 RX ADMIN — LORAZEPAM 0.5 MG: 2 INJECTION INTRAMUSCULAR; INTRAVENOUS at 20:21

## 2019-03-24 RX ADMIN — BENZOCAINE AND MENTHOL 1 LOZENGE: 15; 3.6 LOZENGE ORAL at 14:24

## 2019-03-24 RX ADMIN — BENZOCAINE AND MENTHOL 1 LOZENGE: 15; 3.6 LOZENGE ORAL at 17:51

## 2019-03-24 RX ADMIN — OXYBUTYNIN CHLORIDE 10 MG: 5 TABLET, FILM COATED, EXTENDED RELEASE ORAL at 06:25

## 2019-03-24 RX ADMIN — PIPERACILLIN SODIUM,TAZOBACTAM SODIUM 3.38 G: 3; .375 INJECTION, POWDER, FOR SOLUTION INTRAVENOUS at 16:25

## 2019-03-24 RX ADMIN — ACETAMINOPHEN 1000 MG: 500 TABLET, FILM COATED ORAL at 08:47

## 2019-03-24 ASSESSMENT — PAIN DESCRIPTION - LOCATION
LOCATION: FLANK
LOCATION: THROAT;HEAD

## 2019-03-24 ASSESSMENT — PAIN SCALES - GENERAL
PAINLEVEL_OUTOF10: 7
PAINLEVEL_OUTOF10: 0
PAINLEVEL_OUTOF10: 6
PAINLEVEL_OUTOF10: 4
PAINLEVEL_OUTOF10: 6
PAINLEVEL_OUTOF10: 5
PAINLEVEL_OUTOF10: 0

## 2019-03-24 ASSESSMENT — PAIN DESCRIPTION - PROGRESSION: CLINICAL_PROGRESSION: NOT CHANGED

## 2019-03-24 ASSESSMENT — PAIN DESCRIPTION - PAIN TYPE: TYPE: ACUTE PAIN

## 2019-03-24 ASSESSMENT — PAIN DESCRIPTION - ORIENTATION: ORIENTATION: RIGHT

## 2019-03-24 ASSESSMENT — PAIN DESCRIPTION - DESCRIPTORS: DESCRIPTORS: DISCOMFORT

## 2019-03-24 ASSESSMENT — PAIN DESCRIPTION - ONSET: ONSET: ON-GOING

## 2019-03-24 ASSESSMENT — PAIN - FUNCTIONAL ASSESSMENT: PAIN_FUNCTIONAL_ASSESSMENT: ACTIVITIES ARE NOT PREVENTED

## 2019-03-24 ASSESSMENT — PAIN DESCRIPTION - FREQUENCY: FREQUENCY: CONTINUOUS

## 2019-03-24 NOTE — PROGRESS NOTES
Urology Attending Progress Note      Subjective: patient reports still with intermittent left flank pain but overall slightly reduced ; pt reports really does not want left pcn    Vitals:  /74   Pulse 88   Temp 98.5 °F (36.9 °C) (Oral)   Resp 16   Ht 5' 4\" (1.626 m)   Wt 227 lb 8.2 oz (103.2 kg)   LMP 2018   SpO2 99%   BMI 39.05 kg/m²   Temp  Av.3 °F (37.4 °C)  Min: 98 °F (36.7 °C)  Max: 102.6 °F (39.2 °C)    Intake/Output Summary (Last 24 hours) at 3/24/2019 1141  Last data filed at 3/24/2019 1009  Gross per 24 hour   Intake 3316 ml   Output 1750 ml   Net 1566 ml       Exam: abdomen - soft, no distension; mild left cvat    Labs:  WBC:    Lab Results   Component Value Date    WBC 8.4 2019     Hemoglobin/Hematocrit:    Lab Results   Component Value Date    HGB 8.9 2019    HCT 28.6 2019     BMP:    Lab Results   Component Value Date     2019    K 3.6 2019    K 3.7 10/21/2018     2019    CO2 23 2019    BUN 5 2019    LABALBU 3.6 2019    CREATININE 0.6 2019    CALCIUM 8.6 2019    GFRAA >60 2019    LABGLOM >60 2019     PT/INR:    Lab Results   Component Value Date    PROTIME 11.6 2019    INR 1.02 2019     PTT:  No results found for: APTT[APTT    Urinalysis:     Urine Culture:      Blood Culture:      Antibiotic Therapy:      Imaging:       Impression/Plan: 49 yo F with h/o left ureteral injury after hysterectomy in January. S/p left renal stent placement post-op day 10.  Now POD#3 s/p cysto, left stent removal. She presents with severe left flank pain and mild left hydro on CT     Continue abx therapy per ID  Clinically improved - npo after mn again in case pt will require left Elvis Olson MD

## 2019-03-24 NOTE — PROGRESS NOTES
Patient is A&O. VSS. Patient continues to have ongoing abdominal pain and throat pain. Encourage patient to cough, deep breath, and do IS. Pain medications given as needed. Patient receiving oral and IV antibiotics for infection. /73   Pulse 57   Temp 98.3 °F (36.8 °C) (Oral)   Resp 18   Ht 5' 4\" (1.626 m)   Wt 227 lb 8.2 oz (103.2 kg)   LMP 12/26/2018   SpO2 98%   BMI 39.05 kg/m²     Patient denies any further needs at this time.  Bed is in the lowest position and call light is within reach    Electronically signed by Anne Woodard RN on 3/24/2019 at 9:39 AM

## 2019-03-24 NOTE — PROGRESS NOTES
Pt A & O. VSS. Pt up ad castillo. Steady gait. Pt c/o right flank pain and headache this shift. Scheduled Tylenol and PRN pain medication given. Reports effective. Pt c/o gas pain this shift. N. O. Maalox provided. Pt tolerates diet. Will cont to monitor.

## 2019-03-24 NOTE — PROGRESS NOTES
Hospitalist Progress Note      PCP: [facility]    Date of Admission: 3/21/2019    Chief Complaint on Admission: fevers and left sided flank pain    Pt Seen/Examined and Chart Reviewed. Admitting dx as above    SUBJECTIVE/ OBJECTIVE:     Patient continues to feel ill, has sore throat, dry cough, left sided abdominal pain. She is urinating well. No diarrhea. She appears a bit better today, more conversant. Allergies  Codeine; Robaxin [methocarbamol]; and Diazepam    Medications      Scheduled Meds:   fluconazole  100 mg Intravenous Q24H    piperacillin-tazobactam  3.375 g Intravenous Q8H    linezolid  600 mg Oral 2 times per day    nicotine  1 patch Transdermal Daily    oxybutynin  10 mg Oral Daily       Infusions:   sodium chloride 125 mL/hr at 19 0258       PRN Meds:  aluminum & magnesium hydroxide-simethicone, acetaminophen, calcium carbonate, HYDROmorphone **OR** HYDROmorphone, oxyCODONE-acetaminophen, oxyCODONE-acetaminophen, LORazepam    Vitals    TEMPERATURE:  Current - Temp: 98.3 °F (36.8 °C); Max - Temp  Av.4 °F (37.4 °C)  Min: 98 °F (36.7 °C)  Max: 102.6 °F (39.2 °C)  RESPIRATIONS RANGE: Resp  Av.3  Min: 18  Max: 20  PULSE RANGE: Pulse  Av.3  Min: 57  Max: 114  BLOOD PRESSURE RANGE:  Systolic (72NYO), UAR:130 , Min:111 , XKL:807   ; Diastolic (03IRJ), OFM:18, Min:68, Max:94    PULSE OXIMETRY RANGE: SpO2  Av.8 %  Min: 90 %  Max: 98 %  24HR INTAKE/OUTPUT:      Intake/Output Summary (Last 24 hours) at 3/24/2019 1036  Last data filed at 3/24/2019 1009  Gross per 24 hour   Intake 3316 ml   Output 1750 ml   Net 1566 ml       Exam:      General appearance: no distress, appears stated age and cooperative. HEENT oral thrush is present  Neck: Supple, No jugular venous distention/bruits.    Lungs: diminished, without crackles or wheezing  Heart: Regular rate and rhythm with Normal S1/S2 without  murmurs, rubs or gallops, point of maximum impulse non-displaced  Abdomen: Soft, mildly tender in left flank and suprapubic area, or non-distended without rigidity or guarding and positive bowel sounds all four quadrants. Extremities: No clubbing, cyanosis, or edema bilaterally. Skin: Skin color, texture, turgor normal.    Neurologic: Alert and oriented X 3,   grossly non-focal.  Mental status: Alert, oriented, thought content appropriate. Data    Recent Labs     03/22/19  0724   WBC 8.4   HGB 8.9*   HCT 28.6*         Recent Labs     03/22/19  0724 03/24/19  0616   * 139   K 3.8 3.6    105   CO2 24 23   BUN 9 5*   CREATININE 0.9 0.6     No results for input(s): AST, ALT, ALB, BILIDIR, BILITOT, ALKPHOS in the last 72 hours. No results for input(s): INR in the last 72 hours. No results for input(s): CKTOTAL, CKMB, CKMBINDEX, TROPONINI in the last 72 hours. Consults:     IP CONSULT TO UROLOGY  IP CONSULT TO UROLOGY  IP CONSULT TO UROLOGY  IP CONSULT TO HOSPITALIST  IP CONSULT TO PHARMACY  IP CONSULT TO INFECTIOUS DISEASES    Active Hospital Problems    Diagnosis Date Noted    Complicated UTI (urinary tract infection) [N39.0] 03/22/2019    Urological system complication of procedure [N99.89] 03/22/2019    Flank pain, acute [R10.9] 03/21/2019    Hydronephrosis [N13.30] 03/21/2019         ASSESSMENT AND PLAN      Sepsis, POA  several sources of infection- thrush, pneumonia, UTI. Cont with broad spectrum antibiotics, ID following, on diflucan, zyvox and zosyn. Blood  cx NGTD, urine with yeast. Rapid flu negative  Cont with IVF, decrease rate, change tylenol to as needed to see if she is febrile. Left sided mild hydronephrosis, recent ureteral stent removal:  Urology is following. Creatinine is stable.  Still with pain in left flank, will consider renal ultrasound in am if no improvement    DVT Prophylaxis: SCD, lovenox once today  Diet: DIET GENERAL;  Code Status: Full Code    PT/OT Eval Status:at baseline    Dispo - Lenny Frankel MD

## 2019-03-25 ENCOUNTER — APPOINTMENT (OUTPATIENT)
Dept: ULTRASOUND IMAGING | Age: 49
DRG: 871 | End: 2019-03-25
Payer: COMMERCIAL

## 2019-03-25 LAB
ALBUMIN SERPL-MCNC: 2.9 G/DL (ref 3.4–5)
ANION GAP SERPL CALCULATED.3IONS-SCNC: 13 MMOL/L (ref 3–16)
BASOPHILS ABSOLUTE: 0 K/UL (ref 0–0.2)
BASOPHILS RELATIVE PERCENT: 0 %
BUN BLDV-MCNC: 4 MG/DL (ref 7–20)
CALCIUM SERPL-MCNC: 8.6 MG/DL (ref 8.3–10.6)
CHLORIDE BLD-SCNC: 104 MMOL/L (ref 99–110)
CO2: 23 MMOL/L (ref 21–32)
CREAT SERPL-MCNC: 0.6 MG/DL (ref 0.6–1.1)
EKG ATRIAL RATE: 111 BPM
EKG DIAGNOSIS: NORMAL
EKG P AXIS: 18 DEGREES
EKG P-R INTERVAL: 132 MS
EKG Q-T INTERVAL: 350 MS
EKG QRS DURATION: 84 MS
EKG QTC CALCULATION (BAZETT): 476 MS
EKG R AXIS: -2 DEGREES
EKG T AXIS: -8 DEGREES
EKG VENTRICULAR RATE: 111 BPM
EOSINOPHILS ABSOLUTE: 0 K/UL (ref 0–0.6)
EOSINOPHILS RELATIVE PERCENT: 0 %
GFR AFRICAN AMERICAN: >60
GFR NON-AFRICAN AMERICAN: >60
GLUCOSE BLD-MCNC: 91 MG/DL (ref 70–99)
HCT VFR BLD CALC: 28 % (ref 36–48)
HEMOGLOBIN: 8.8 G/DL (ref 12–16)
LYMPHOCYTES ABSOLUTE: 1 K/UL (ref 1–5.1)
LYMPHOCYTES RELATIVE PERCENT: 12 %
MCH RBC QN AUTO: 21.2 PG (ref 26–34)
MCHC RBC AUTO-ENTMCNC: 31.5 G/DL (ref 31–36)
MCV RBC AUTO: 67.3 FL (ref 80–100)
MONOCYTES ABSOLUTE: 0.9 K/UL (ref 0–1.3)
MONOCYTES RELATIVE PERCENT: 11 %
NEUTROPHILS ABSOLUTE: 6.3 K/UL (ref 1.7–7.7)
NEUTROPHILS RELATIVE PERCENT: 77 %
PDW BLD-RTO: 22.8 % (ref 12.4–15.4)
PHOSPHORUS: 2.4 MG/DL (ref 2.5–4.9)
PLATELET # BLD: 253 K/UL (ref 135–450)
PMV BLD AUTO: 8.7 FL (ref 5–10.5)
POTASSIUM SERPL-SCNC: 3.6 MMOL/L (ref 3.5–5.1)
RBC # BLD: 4.16 M/UL (ref 4–5.2)
SODIUM BLD-SCNC: 140 MMOL/L (ref 136–145)
WBC # BLD: 8.2 K/UL (ref 4–11)

## 2019-03-25 PROCEDURE — 85025 COMPLETE CBC W/AUTO DIFF WBC: CPT

## 2019-03-25 PROCEDURE — 80069 RENAL FUNCTION PANEL: CPT

## 2019-03-25 PROCEDURE — 6360000002 HC RX W HCPCS: Performed by: INTERNAL MEDICINE

## 2019-03-25 PROCEDURE — 93010 ELECTROCARDIOGRAM REPORT: CPT | Performed by: INTERNAL MEDICINE

## 2019-03-25 PROCEDURE — 6370000000 HC RX 637 (ALT 250 FOR IP): Performed by: INTERNAL MEDICINE

## 2019-03-25 PROCEDURE — 1200000000 HC SEMI PRIVATE

## 2019-03-25 PROCEDURE — 36415 COLL VENOUS BLD VENIPUNCTURE: CPT

## 2019-03-25 PROCEDURE — 76770 US EXAM ABDO BACK WALL COMP: CPT

## 2019-03-25 PROCEDURE — 6370000000 HC RX 637 (ALT 250 FOR IP): Performed by: NURSE PRACTITIONER

## 2019-03-25 PROCEDURE — 99232 SBSQ HOSP IP/OBS MODERATE 35: CPT | Performed by: INTERNAL MEDICINE

## 2019-03-25 PROCEDURE — 2580000003 HC RX 258: Performed by: INTERNAL MEDICINE

## 2019-03-25 RX ORDER — ONDANSETRON 2 MG/ML
4 INJECTION INTRAMUSCULAR; INTRAVENOUS EVERY 6 HOURS PRN
Status: DISCONTINUED | OUTPATIENT
Start: 2019-03-25 | End: 2019-03-26

## 2019-03-25 RX ORDER — FUROSEMIDE 10 MG/ML
20 INJECTION INTRAMUSCULAR; INTRAVENOUS ONCE
Status: COMPLETED | OUTPATIENT
Start: 2019-03-25 | End: 2019-03-25

## 2019-03-25 RX ORDER — FLUCONAZOLE 100 MG/1
100 TABLET ORAL DAILY
Status: DISCONTINUED | OUTPATIENT
Start: 2019-03-26 | End: 2019-03-26 | Stop reason: HOSPADM

## 2019-03-25 RX ADMIN — PIPERACILLIN SODIUM,TAZOBACTAM SODIUM 3.38 G: 3; .375 INJECTION, POWDER, FOR SOLUTION INTRAVENOUS at 08:33

## 2019-03-25 RX ADMIN — PIPERACILLIN SODIUM,TAZOBACTAM SODIUM 3.38 G: 3; .375 INJECTION, POWDER, FOR SOLUTION INTRAVENOUS at 00:27

## 2019-03-25 RX ADMIN — FLUCONAZOLE, SODIUM CHLORIDE 100 MG: 2 INJECTION INTRAVENOUS at 08:33

## 2019-03-25 RX ADMIN — LINEZOLID 600 MG: 600 TABLET, FILM COATED ORAL at 08:33

## 2019-03-25 RX ADMIN — HYDROMORPHONE HYDROCHLORIDE 0.5 MG: 1 INJECTION, SOLUTION INTRAMUSCULAR; INTRAVENOUS; SUBCUTANEOUS at 00:44

## 2019-03-25 RX ADMIN — OXYCODONE AND ACETAMINOPHEN 2 TABLET: 5; 325 TABLET ORAL at 16:08

## 2019-03-25 RX ADMIN — ONDANSETRON 4 MG: 2 INJECTION INTRAMUSCULAR; INTRAVENOUS at 00:37

## 2019-03-25 RX ADMIN — FUROSEMIDE 20 MG: 10 INJECTION, SOLUTION INTRAVENOUS at 11:05

## 2019-03-25 ASSESSMENT — PAIN SCALES - GENERAL
PAINLEVEL_OUTOF10: 6
PAINLEVEL_OUTOF10: 0
PAINLEVEL_OUTOF10: 6
PAINLEVEL_OUTOF10: 7
PAINLEVEL_OUTOF10: 4
PAINLEVEL_OUTOF10: 4

## 2019-03-25 NOTE — PROGRESS NOTES
Patient is A&O. VSS. Patient has crackles in the bases of lungs. Patient reminded to cough and deep breath. Patient has some pain at this time. She has been NPO since midnight for US. Patient continues to have left sided flank pain. /76   Pulse 79   Temp 98.7 °F (37.1 °C) (Oral)   Resp 18   Ht 5' 4\" (1.626 m)   Wt 227 lb 8.2 oz (103.2 kg)   LMP 12/26/2018   SpO2 97%   BMI 39.05 kg/m²     Patient denies any further needs at this time.  Bed is in the lowest position and call light is within reach    Electronically signed by Laura Bailon RN on 3/25/2019 at 8:00 AM

## 2019-03-25 NOTE — PROGRESS NOTES
Hospitalist Progress Note      PCP: [facility]    Date of Admission: 3/21/2019    Chief Complaint on Admission: fevers and left sided flank pain    Pt Seen/Examined and Chart Reviewed. Admitting dx as above    SUBJECTIVE/ OBJECTIVE:     Patient reports worsening shortness of breath today, cough is dry, non productive and she had orthopnea and PND last night. Left flank pain is better. Allergies  Codeine; Robaxin [methocarbamol]; and Diazepam    Medications      Scheduled Meds:   furosemide  20 mg Intravenous Once    fluconazole  100 mg Intravenous Q24H    piperacillin-tazobactam  3.375 g Intravenous Q8H    linezolid  600 mg Oral 2 times per day    nicotine  1 patch Transdermal Daily    oxybutynin  10 mg Oral Daily       Infusions:      PRN Meds:  ondansetron, aluminum & magnesium hydroxide-simethicone, acetaminophen, benzocaine-menthol, simethicone, calcium carbonate, HYDROmorphone **OR** HYDROmorphone, oxyCODONE-acetaminophen, oxyCODONE-acetaminophen, LORazepam    Vitals    TEMPERATURE:  Current - Temp: 98.4 °F (36.9 °C); Max - Temp  Av.6 °F (37 °C)  Min: 98.2 °F (36.8 °C)  Max: 99 °F (37.2 °C)  RESPIRATIONS RANGE: Resp  Av.7  Min: 16  Max: 19  PULSE RANGE: Pulse  Av  Min: 64  Max: 81  BLOOD PRESSURE RANGE:  Systolic (70ONF), HL , Min:110 , GT   ; Diastolic (03OHX), TPF:81, Min:51, Max:84    PULSE OXIMETRY RANGE: SpO2  Av.7 %  Min: 94 %  Max: 98 %  24HR INTAKE/OUTPUT:      Intake/Output Summary (Last 24 hours) at 3/25/2019 1052  Last data filed at 3/25/2019 1031  Gross per 24 hour   Intake 2588 ml   Output 1300 ml   Net 1288 ml       Exam:      General appearance: mild resp distress, appears stated age and cooperative. HEENT oral thrush is present  Neck: Supple, No jugular venous distention/bruits.    Lungs: diminished, without crackles or wheezing  Heart: Regular rate and rhythm with Normal S1/S2 without  murmurs, rubs or gallops, point

## 2019-03-25 NOTE — PLAN OF CARE
Problem: Urinary Elimination:  Goal: Complications related to the disease process, condition or treatment will be avoided or minimized  Description  Complications related to the disease process, condition or treatment will be avoided or minimized  Note:   Patient continues to have left sided flank pain.  Patient is receiving IV antibiotics to help with symptoms and infection at this time

## 2019-03-25 NOTE — PROGRESS NOTES
ID Follow-up NOTE    CC:   Fever, urologic procedures  Antibiotics: Zosyn, Linezolid, Fluconazole    Admit Date: 3/21/2019  Hospital Day: 5    Subjective:     Patient reports she is better. No flank pain      Objective:     Patient Vitals for the past 8 hrs:   BP Temp Temp src Pulse Resp SpO2   03/25/19 1203 115/64 98 °F (36.7 °C) Oral 80 17 93 %   03/25/19 1029 (!) 110/51 98.4 °F (36.9 °C) Oral 77 16 97 %     I/O last 3 completed shifts: In: 2656 [I.V.:2377; IV Piggyback:279]  Out: 2000 [Urine:2000]  No intake/output data recorded. EXAM:  GENERAL: No apparent distress. HEENT: Membranes moist, no oral lesion  NECK:  Supple  LUNGS: Clear b/l, no rales, no dullness  CARDIAC: RRR, no murmur appreciated  ABD:  + BS, soft / NT - no CVAT  EXT:  No rash, no edema, no lesions  NEURO: No focal neurologic findings  PSYCH: Orientation, sensorium, mood normal  LINES:  Peripheral iv       Data Review:  Lab Results   Component Value Date    WBC 8.2 03/25/2019    HGB 8.8 (L) 03/25/2019    HCT 28.0 (L) 03/25/2019    MCV 67.3 (L) 03/25/2019     03/25/2019     Lab Results   Component Value Date    CREATININE 0.6 03/25/2019    BUN 4 (L) 03/25/2019     03/25/2019    K 3.6 03/25/2019     03/25/2019    CO2 23 03/25/2019       Hepatic Function Panel:   Lab Results   Component Value Date    ALKPHOS 83 01/21/2019    ALT 16 01/21/2019    AST 15 01/21/2019    PROT 7.2 01/21/2019    BILITOT 0.3 01/21/2019    BILIDIR <0.2 10/21/2018    IBILI see below 10/21/2018    LABALBU 2.9 03/25/2019       MICRO:  3/22 Urine cult 25k Yeast  3/21 BC - no growth to date  3/21 Influenza A / B antigen negative    IMAGING:  3/25 Renal u/s:  Interval resolution of left-sided hydronephrosis compared to CT abdomen from 3/21/2019.     CT ABD-Pelvis WO Contrast  Resulted: 03/21/19 0818   1.   Minimal excretion into the left collecting system on delayed images.    Mild left hydroureteronephrosis and surrounding stranding as on the   study earlier today. Evern Stage may reflect distal ureteral obstruction   due to recently passed calculus, inflammatory changes/stricture. 2.  Small gas bubble in the urinary bladder.  DDX recent instrumentation,   infection. 3.  Left pelvic 3.6 cm low-density lesion, likely ovarian cyst.  4.  Other nonacute findings. Scheduled Meds:   fluconazole  100 mg Intravenous Q24H    piperacillin-tazobactam  3.375 g Intravenous Q8H    linezolid  600 mg Oral 2 times per day    nicotine  1 patch Transdermal Daily    oxybutynin  10 mg Oral Daily       Continuous Infusions:      PRN Meds:  ondansetron, aluminum & magnesium hydroxide-simethicone, acetaminophen, benzocaine-menthol, simethicone, calcium carbonate, HYDROmorphone **OR** HYDROmorphone, oxyCODONE-acetaminophen, oxyCODONE-acetaminophen, LORazepam      Assessment:     49 yo woman with hx hysterectomy with consequent adhesions and L ureteral obstruction in Jan 2019. She had L ureteral stent placed. Stent removed 3/20.     Pt subsequently developed L flank pain, rigors. Presented to [facility] ED 3/21 -Tm 103.1. WBC 12.5.   Urine / BC sent  Started on vancomycin / zosyn.     On 3/22 - Tm 101.1, WBC 8.4,   Today 3/25 - afebrile since Friday.     IMP/   procedure manipulation  Hydronephrosis, resolved  Complicated UTI  Fever resolved    Plan:     D/c zosyn, zyvox  Cont fluconazole, change to po    Discussed with pt  Anamaria Elizabeth

## 2019-03-25 NOTE — PLAN OF CARE
Problem: Pain:  Goal: Pain level will decrease  Description  Pain level will decrease  Outcome: Ongoing  Note:   Pt c/o right flank pain this shift. PRN pain medication given. Reports effective. Will cont to monitor. Problem: Falls - Risk of:  Goal: Will remain free from falls  Description  Will remain free from falls  Note:   Pt is up ad castillo with a steady gait. Pt uses call light appropriately for assistance. Will cont to monitor.

## 2019-03-25 NOTE — PLAN OF CARE
Problem: Pain:  Goal: Pain level will decrease  Description  Pain level will decrease  Outcome: Ongoing  Note:   Pt continues to c/o of left sided flank pain, but states it is improving. Pt medicated with prn oral analgesic with positive results. Will continue to monitor. Problem: Falls - Risk of:  Goal: Will remain free from falls  Description  Will remain free from falls  Outcome: Ongoing  Note:   Pt up ad castillo with steady gait. No use of  an assistive device. Bed in lowest position, wheels locked, call light within reach. Problem: Urinary Elimination:  Goal: Signs and symptoms of infection will decrease  Description  Signs and symptoms of infection will decrease  Outcome: Ongoing  Note:   Pt denies any urgency, burning or frequency. Pt remains afebrile and WBC WNL.

## 2019-03-25 NOTE — PROGRESS NOTES
4 Eyes Admission Assessment     I agree as the admission nurse that 2 RN's have performed a thorough Head to Toe Skin Assessment on the patient. ALL assessment sites listed below have been assessed on admission. Areas assessed by both nurses:    [x]   Head, Face, and Ears   [x]   Shoulders, Back, and Chest  [x]   Arms, Elbows, and Hands   [x]   Coccyx, Sacrum, and Ischum  [x]   Legs, Feet, and Heels        Does the Patient have Skin Breakdown?   No         Melvin Prevention initiated:  NA   Wound Care Orders initiated:  NA      WOC nurse consulted for Pressure Injury (Stage 3,4, Unstageable, DTI, NWPT, and Complex wounds):  NA      Nurse 1 eSignature: Electronically signed by Jimena Rose RN on 3/25/19 at 6:38 PM    **SHARE this note so that the co-signing nurse is able to place an eSignature**    Nurse 2 eSignature: Electronically signed by Marylen Santee, RN on 3/25/19 at 6:39 PM

## 2019-03-25 NOTE — PROGRESS NOTES
Pt A & O. VSS. Pt up ad castillo. Steady gait. Pt c/o pain this shift. PRN pain medication given. Reports effective. Pt c/o spasms this shift. PRN Ativan given. Reports effective. Pt c/o nausea this shift. Emesis x 2. N.O. PRN Zofran. Reports effective. Pt NPO after midnight for procedure. Will cont to monitor.

## 2019-03-25 NOTE — PROGRESS NOTES
Urology Progress Note      /76   Pulse 79   Temp 98.7 °F (37.1 °C) (Oral)   Resp 18   Ht 5' 4\" (1.626 m)   Wt 227 lb 8.2 oz (103.2 kg)   LMP 2018   SpO2 97%   BMI 39.05 kg/m²   Temp  Av.6 °F (37 °C)  Min: 98.2 °F (36.8 °C)  Max: 99 °F (37.2 °C)    PT SEEN AND EXAMINED  I HAVE ORDERED A RENAL US TO EVAL IF HYDRO IS STABLE/RESOLVED  SHE IS IMPROVING FROM A  STANDPOINT. OK TO EAT IF US IS NORMAL.  Yany Calvo MD

## 2019-03-26 VITALS
OXYGEN SATURATION: 98 % | BODY MASS INDEX: 38.84 KG/M2 | HEART RATE: 84 BPM | SYSTOLIC BLOOD PRESSURE: 108 MMHG | WEIGHT: 227.51 LBS | RESPIRATION RATE: 18 BRPM | HEIGHT: 64 IN | TEMPERATURE: 98.6 F | DIASTOLIC BLOOD PRESSURE: 74 MMHG

## 2019-03-26 PROCEDURE — 6370000000 HC RX 637 (ALT 250 FOR IP): Performed by: INTERNAL MEDICINE

## 2019-03-26 PROCEDURE — 6370000000 HC RX 637 (ALT 250 FOR IP): Performed by: FAMILY MEDICINE

## 2019-03-26 PROCEDURE — 99232 SBSQ HOSP IP/OBS MODERATE 35: CPT | Performed by: INTERNAL MEDICINE

## 2019-03-26 PROCEDURE — 6370000000 HC RX 637 (ALT 250 FOR IP): Performed by: NURSE PRACTITIONER

## 2019-03-26 RX ORDER — ONDANSETRON 4 MG/1
4 TABLET, ORALLY DISINTEGRATING ORAL EVERY 6 HOURS PRN
Status: DISCONTINUED | OUTPATIENT
Start: 2019-03-26 | End: 2019-03-26 | Stop reason: HOSPADM

## 2019-03-26 RX ORDER — LACTOBACILLUS RHAMNOSUS GG 10B CELL
1 CAPSULE ORAL 2 TIMES DAILY
Status: DISCONTINUED | OUTPATIENT
Start: 2019-03-26 | End: 2019-03-26 | Stop reason: HOSPADM

## 2019-03-26 RX ADMIN — ACETAMINOPHEN 650 MG: 325 TABLET ORAL at 19:17

## 2019-03-26 RX ADMIN — MICONAZOLE NITRATE 1 APPLICATOR: 20 CREAM VAGINAL at 14:10

## 2019-03-26 RX ADMIN — FLUCONAZOLE 100 MG: 100 TABLET ORAL at 09:52

## 2019-03-26 RX ADMIN — ONDANSETRON 4 MG: 4 TABLET, ORALLY DISINTEGRATING ORAL at 05:48

## 2019-03-26 RX ADMIN — ACETAMINOPHEN 650 MG: 325 TABLET ORAL at 03:08

## 2019-03-26 RX ADMIN — OXYBUTYNIN CHLORIDE 10 MG: 5 TABLET, FILM COATED, EXTENDED RELEASE ORAL at 09:52

## 2019-03-26 RX ADMIN — Medication 1 CAPSULE: at 09:52

## 2019-03-26 ASSESSMENT — PAIN SCALES - GENERAL
PAINLEVEL_OUTOF10: 4
PAINLEVEL_OUTOF10: 6
PAINLEVEL_OUTOF10: 4

## 2019-03-26 ASSESSMENT — PAIN DESCRIPTION - PAIN TYPE: TYPE: ACUTE PAIN

## 2019-03-26 ASSESSMENT — PAIN DESCRIPTION - LOCATION: LOCATION: ABDOMEN

## 2019-03-26 NOTE — PROGRESS NOTES
non-displaced  Abdomen: Soft, mildly tender in left flank and suprapubic area, or non-distended without rigidity or guarding and positive bowel sounds all four quadrants. Extremities: No clubbing, cyanosis, 1+ pitting leg edema bilaterally. Skin: Skin color, texture, turgor normal.    Neurologic: Alert and oriented X 3,   grossly non-focal.  Mental status: Alert, oriented, thought content appropriate. Data    Recent Labs     03/25/19  0658   WBC 8.2   HGB 8.8*   HCT 28.0*         Recent Labs     03/24/19  0616 03/25/19  0658    140   K 3.6 3.6    104   CO2 23 23   PHOS  --  2.4*   BUN 5* 4*   CREATININE 0.6 0.6     No results for input(s): AST, ALT, ALB, BILIDIR, BILITOT, ALKPHOS in the last 72 hours. No results for input(s): INR in the last 72 hours. No results for input(s): CKTOTAL, CKMB, CKMBINDEX, TROPONINI in the last 72 hours. Consults:     IP CONSULT TO UROLOGY  IP CONSULT TO UROLOGY  IP CONSULT TO UROLOGY  IP CONSULT TO HOSPITALIST  IP CONSULT TO PHARMACY  IP CONSULT TO INFECTIOUS DISEASES    Active Hospital Problems    Diagnosis Date Noted    Complicated UTI (urinary tract infection) [N39.0] 03/22/2019    Urological system complication of procedure [N99.89] 03/22/2019    Flank pain, acute [R10.9] 03/21/2019    Hydronephrosis [N13.30] 03/21/2019         ASSESSMENT AND PLAN      Sepsis, POA  several sources of infection- thrush, pneumonia, UTI. Cont with broad spectrum antibiotics, ID following, on diflucan,  And finished course of zyvox and zosyn. Blood  cx NGTD, urine with yeast. Rapid flu negative. Edema better with lasix. Left sided mild hydronephrosis, recent ureteral stent removal:  Urology is following. Creatinine is stable. Renal ultrasound showed resolution of hydro. Urine cx negative.      DVT Prophylaxis: SCD  Diet: DIET GENERAL;  Code Status: Full Code    PT/OT Eval Status:at baseline    Dispo - inpt, okay to discharge form medical stand

## 2019-03-26 NOTE — PROGRESS NOTES
ID Follow-up NOTE    CC:   Fever, urologic procedures  Antibiotics: Fluconazole    Admit Date: 3/21/2019  Hospital Day: 6    Subjective:     Patient reports she 'feels good'. No flank pain      Objective:     Vitals:    03/25/19 1939   BP: 110/65   Pulse: 87   Resp: 16   Temp: 98.6 °F (37 °C)   SpO2:      I/O last 3 completed shifts: In: 810 [P.O.:360; I.V.:413]  Out: 2200 [Urine:2200]  No intake/output data recorded. EXAM:  GENERAL: No apparent distress. HEENT: Membranes moist, no oral lesion  NECK:  Supple  LUNGS: Clear b/l, no rales, no dullness  CARDIAC: RRR, no murmur appreciated  ABD:  + BS, soft / NT - no CVAT  EXT:  No rash, no edema, no lesions  NEURO: No focal neurologic findings  PSYCH: Orientation, sensorium, mood normal  LINES:  Peripheral iv       Data Review:  Lab Results   Component Value Date    WBC 8.2 03/25/2019    HGB 8.8 (L) 03/25/2019    HCT 28.0 (L) 03/25/2019    MCV 67.3 (L) 03/25/2019     03/25/2019     Lab Results   Component Value Date    CREATININE 0.6 03/25/2019    BUN 4 (L) 03/25/2019     03/25/2019    K 3.6 03/25/2019     03/25/2019    CO2 23 03/25/2019       Hepatic Function Panel:   Lab Results   Component Value Date    ALKPHOS 83 01/21/2019    ALT 16 01/21/2019    AST 15 01/21/2019    PROT 7.2 01/21/2019    BILITOT 0.3 01/21/2019    BILIDIR <0.2 10/21/2018    IBILI see below 10/21/2018    LABALBU 2.9 03/25/2019       MICRO:  3/22 Urine cult 25k Yeast  3/21 BC - no growth to date  3/21 Influenza A / B antigen negative    IMAGING:  3/25 Renal u/s:  Interval resolution of left-sided hydronephrosis compared to CT abdomen from 3/21/2019.     CT ABD-Pelvis WO Contrast  Resulted: 03/21/19 0818   1.   Minimal excretion into the left collecting system on delayed images.    Mild left hydroureteronephrosis and surrounding stranding as on the   study earlier today.  Findings may reflect distal ureteral obstruction   due to recently passed calculus, inflammatory changes/stricture. 2.  Small gas bubble in the urinary bladder.  DDX recent instrumentation,   infection. 3.  Left pelvic 3.6 cm low-density lesion, likely ovarian cyst.  4.  Other nonacute findings. Scheduled Meds:   miconazole  1 applicator Vaginal Daily    lactobacillus  1 capsule Oral BID    fluconazole  100 mg Oral Daily    nicotine  1 patch Transdermal Daily    oxybutynin  10 mg Oral Daily       Continuous Infusions:      PRN Meds:  ondansetron, aluminum & magnesium hydroxide-simethicone, acetaminophen, benzocaine-menthol, simethicone, calcium carbonate, HYDROmorphone **OR** HYDROmorphone, oxyCODONE-acetaminophen, oxyCODONE-acetaminophen, LORazepam      Assessment:     51 yo woman with hx hysterectomy with consequent adhesions and L ureteral obstruction in Jan 2019. She had L ureteral stent placed. Stent removed 3/20.     Pt subsequently developed L flank pain, rigors. Presented to [facility] ED 3/21 -Tm 103.1. WBC 12.5.   Urine / BC sent  Started on vancomycin / zosyn.     On 3/22 - Tm 101.1, WBC 8.4,   Afebrile since Friday.     IMP/   procedure manipulation  Hydronephrosis, resolved  Complicated UTI  Fever resolved    Plan:     Cont fluconazole - home on flu 100 /d x 5 days further    Discussed with pt  Kevin November

## 2019-03-26 NOTE — PROGRESS NOTES
Pt verbally cleared for d/c by Hospitalist, ID, Urology. No order for d/c. Calls placed to Urology for clarification.

## 2019-03-26 NOTE — PROGRESS NOTES
Unable to obtain abx scripts prior to d/c. Pt aware, Nurse supervisor made aware of situation. Per pt's request, pt will be d/c'd as ordered this evening and follow up with PCP in a.m. For prescriptions.

## 2019-03-27 ENCOUNTER — TELEPHONE (OUTPATIENT)
Dept: INFECTIOUS DISEASES | Age: 49
End: 2019-03-27

## 2019-03-27 LAB
BLOOD CULTURE, ROUTINE: NORMAL
CULTURE, BLOOD 2: NORMAL

## 2019-03-27 RX ORDER — FLUCONAZOLE 100 MG/1
100 TABLET ORAL DAILY
Qty: 5 TABLET | Refills: 0 | Status: SHIPPED | OUTPATIENT
Start: 2019-03-27 | End: 2019-04-10

## 2019-04-03 NOTE — DISCHARGE SUMMARY
without  murmurs, rubs or gallops, point of maximum impulse non-displaced  Abdomen: Soft, non-tender or non-distended without rigidity or guarding and positive bowel sounds all four quadrants. Extremities: No clubbing, cyanosis, or edema bilaterally. Skin: Skin color, texture, turgor normal.   Neurologic: Alert and oriented X 3,   grossly non-focal.  Mental status: Alert, oriented, thought content appropriate      Labs: For convenience and continuity at follow-up the following most recent labs are provided:    CBC:   Lab Results   Component Value Date    WBC 8.2 03/25/2019    HGB 8.8 03/25/2019    HCT 28.0 03/25/2019     03/25/2019       RENAL:   Lab Results   Component Value Date     03/25/2019    K 3.6 03/25/2019    K 3.7 10/21/2018     03/25/2019    CO2 23 03/25/2019    BUN 4 03/25/2019    CREATININE 0.6 03/25/2019           Discharge Medications:   Discharge Medication List as of 3/26/2019  7:36 PM           Details   ! ! phenazopyridine (PYRIDIUM) 200 MG tablet Take one pill po bid prn pain, Disp-30 tablet, R-0Normal      fluconazole (DIFLUCAN) 200 MG tablet Take one tablet po qd, Disp-14 tablet, R-0Normal      !! phenazopyridine (PYRIDIUM) 200 MG tablet Take one tablet po bid prn painful urination, Disp-30 tablet, R-0Normal      bisacodyl (BISAC-EVAC) 10 MG suppository Place one suppository per rectum qd as needed for constipation, Disp-4 suppository, R-0Normal      ibuprofen (ADVIL;MOTRIN) 600 MG tablet Take 600 mg by mouth 3 times daily (with meals)Historical Med      propranolol (INDERAL) 10 MG tablet Take 10 mg by mouth 2 times daily as neededHistorical Med      ondansetron (ZOFRAN-ODT) 4 MG disintegrating tablet Take 1 tablet by mouth every 8 hours as needed for Nausea, Disp-25 tablet, R-0Normal      ferrous sulfate dried (SLOW IRON) 160 (50 Fe) MG TBCR extended release tablet Take 160 mg by mouth daily, Disp-30 tablet, R-1Print       !! - Potential duplicate medications found. Please discuss with provider. Signed:  Job Chi MD   4/3/2019      Thank you [facility] for the opportunity to be involved in this patient's care. If you have any questions or concerns please feel free to contact me at [phone].

## 2019-04-22 LAB — CULTURE, FUNGUS BLOOD: NORMAL

## 2019-04-30 ENCOUNTER — TELEPHONE (OUTPATIENT)
Dept: ENDOCRINOLOGY | Age: 49
End: 2019-04-30

## 2019-04-30 NOTE — TELEPHONE ENCOUNTER
Called patient to confirm her appt tomorrow for her prolia and she said she does not know what that is and also had an appt with dr nunez on 11-4 for dexa but she stated she does not see dr nunez.

## 2019-08-18 ENCOUNTER — APPOINTMENT (OUTPATIENT)
Dept: GENERAL RADIOLOGY | Age: 49
End: 2019-08-18
Payer: COMMERCIAL

## 2019-08-18 ENCOUNTER — HOSPITAL ENCOUNTER (EMERGENCY)
Age: 49
Discharge: HOME | End: 2019-08-18
Attending: EMERGENCY MEDICINE
Payer: COMMERCIAL

## 2019-08-18 VITALS
HEIGHT: 64 IN | SYSTOLIC BLOOD PRESSURE: 133 MMHG | BODY MASS INDEX: 39.27 KG/M2 | WEIGHT: 230 LBS | OXYGEN SATURATION: 99 % | DIASTOLIC BLOOD PRESSURE: 87 MMHG | TEMPERATURE: 98.4 F | HEART RATE: 89 BPM | RESPIRATION RATE: 15 BRPM

## 2019-08-18 DIAGNOSIS — S83.261A PERIPHERAL TEAR OF LATERAL MENISCUS OF RIGHT KNEE AS CURRENT INJURY, INITIAL ENCOUNTER: Primary | ICD-10-CM

## 2019-08-18 DIAGNOSIS — S83.421A SPRAIN OF LATERAL COLLATERAL LIGAMENT OF RIGHT KNEE, INITIAL ENCOUNTER: ICD-10-CM

## 2019-08-18 PROCEDURE — 73562 X-RAY EXAM OF KNEE 3: CPT

## 2019-08-18 PROCEDURE — 6370000000 HC RX 637 (ALT 250 FOR IP): Performed by: EMERGENCY MEDICINE

## 2019-08-18 PROCEDURE — 99283 EMERGENCY DEPT VISIT LOW MDM: CPT

## 2019-08-18 PROCEDURE — L1830 KO IMMOB CANVAS LONG PRE OTS: HCPCS

## 2019-08-18 RX ORDER — HYDROCODONE BITARTRATE AND ACETAMINOPHEN 5; 325 MG/1; MG/1
1 TABLET ORAL EVERY 6 HOURS PRN
Qty: 10 TABLET | Refills: 0 | Status: SHIPPED | OUTPATIENT
Start: 2019-08-18 | End: 2019-08-21

## 2019-08-18 RX ORDER — IBUPROFEN 600 MG/1
600 TABLET ORAL EVERY 6 HOURS PRN
Qty: 30 TABLET | Refills: 0 | Status: SHIPPED | OUTPATIENT
Start: 2019-08-18

## 2019-08-18 RX ORDER — HYDROCODONE BITARTRATE AND ACETAMINOPHEN 5; 325 MG/1; MG/1
1 TABLET ORAL ONCE
Status: COMPLETED | OUTPATIENT
Start: 2019-08-18 | End: 2019-08-18

## 2019-08-18 RX ADMIN — HYDROCODONE BITARTRATE AND ACETAMINOPHEN 1 TABLET: 5; 325 TABLET ORAL at 05:02

## 2019-08-18 ASSESSMENT — PAIN DESCRIPTION - LOCATION: LOCATION: KNEE

## 2019-08-18 ASSESSMENT — PAIN DESCRIPTION - PAIN TYPE: TYPE: ACUTE PAIN

## 2019-08-18 ASSESSMENT — PAIN DESCRIPTION - DESCRIPTORS: DESCRIPTORS: RADIATING;SHOOTING

## 2019-08-18 ASSESSMENT — PAIN SCALES - GENERAL: PAINLEVEL_OUTOF10: 3

## 2019-08-18 ASSESSMENT — PAIN DESCRIPTION - ORIENTATION: ORIENTATION: RIGHT

## 2020-09-29 ENCOUNTER — NURSE TRIAGE (OUTPATIENT)
Dept: OTHER | Facility: CLINIC | Age: 50
End: 2020-09-29

## 2020-09-29 ENCOUNTER — OFFICE VISIT (OUTPATIENT)
Dept: GYNECOLOGY | Age: 50
End: 2020-09-29
Payer: COMMERCIAL

## 2020-09-29 VITALS
DIASTOLIC BLOOD PRESSURE: 86 MMHG | TEMPERATURE: 97.1 F | HEIGHT: 64 IN | BODY MASS INDEX: 45.41 KG/M2 | WEIGHT: 266 LBS | SYSTOLIC BLOOD PRESSURE: 139 MMHG

## 2020-09-29 LAB
BILIRUBIN, POC: NEGATIVE
BLOOD URINE, POC: NORMAL
CLARITY, POC: NORMAL
COLOR, POC: YELLOW
GLUCOSE URINE, POC: NEGATIVE
KETONES, POC: NEGATIVE
LEUKOCYTE EST, POC: NORMAL
NITRITE, POC: NEGATIVE
PH, POC: 5.5
PROTEIN, POC: NEGATIVE
SPECIFIC GRAVITY, POC: 1.02
UROBILINOGEN, POC: 0.2

## 2020-09-29 PROCEDURE — 99214 OFFICE O/P EST MOD 30 MIN: CPT | Performed by: OBSTETRICS & GYNECOLOGY

## 2020-09-29 PROCEDURE — 81002 URINALYSIS NONAUTO W/O SCOPE: CPT | Performed by: OBSTETRICS & GYNECOLOGY

## 2020-09-29 RX ORDER — PHENAZOPYRIDINE HYDROCHLORIDE 200 MG/1
200 TABLET, FILM COATED ORAL 3 TIMES DAILY PRN
Qty: 6 TABLET | Refills: 0 | Status: SHIPPED | OUTPATIENT
Start: 2020-09-29 | End: 2020-10-02

## 2020-09-29 RX ORDER — SULFAMETHOXAZOLE AND TRIMETHOPRIM 800; 160 MG/1; MG/1
1 TABLET ORAL 2 TIMES DAILY
Qty: 20 TABLET | Refills: 0 | Status: SHIPPED | OUTPATIENT
Start: 2020-09-29 | End: 2020-10-09

## 2020-09-29 ASSESSMENT — ENCOUNTER SYMPTOMS
NAUSEA: 0
SORE THROAT: 0
DIARRHEA: 0
COLOR CHANGE: 0
VOMITING: 0
CONSTIPATION: 0
CHEST TIGHTNESS: 0
PHOTOPHOBIA: 0
COUGH: 0
WHEEZING: 0
BACK PAIN: 0
ABDOMINAL DISTENTION: 0
RECTAL PAIN: 0
ABDOMINAL PAIN: 0
TROUBLE SWALLOWING: 0
APNEA: 0
BLOOD IN STOOL: 0
ANAL BLEEDING: 0
SHORTNESS OF BREATH: 0

## 2020-09-29 NOTE — PROGRESS NOTES
Gold Hernández  YOB: 1970    Date of Service:  2020    Chief Complaint:   Gold Hernández is a 48 y.o. female who presents for UTI symptoms x 2 days        HPI:  Patient is perimenopausal - Patient's last menstrual period was 2018. [place] She reports burning with urination and frequency and urgency x 2 days, no fevers, no abdominal pain, no flank pain, no blood in urine. No lower genital bleeding since hysterectomy. She reports increased in abdominal girth and has gained 33 lbs since last visit. No changes in appetite, no blood in stool     Health Maintenance   Topic Date Due    A1C test (Diabetic or Prediabetic)  1980    HIV screen  1985    DTaP/Tdap/Td vaccine (1 - Tdap) 1989    Lipid screen  2010    Annual Wellness Visit (AWV)  2019    Shingles Vaccine (1 of 2) 2020    Flu vaccine (1) 2020    Breast cancer screen  2020    Colon cancer screen colonoscopy  10/22/2028    Pneumococcal 0-64 years Vaccine  Completed    Hepatitis A vaccine  Aged Out    Hepatitis B vaccine  Aged Out    Hib vaccine  Aged Out    Meningococcal (ACWY) vaccine  Aged Out       Past Medical History:   Diagnosis Date    Anxiety     Depression     Graves disease     Graves disease     Graves' disease     Graves' disease     History of blood transfusion     Hyperlipidemia     Rapid heart beat     Thyroid disease      Past Surgical History:   Procedure Laterality Date     SECTION      COLONOSCOPY  10/22/2018    COLONOSCOPY WITH BIOPSY performed by Jesse De La Rosa MD at [facility]  10/22/2018    COLONOSCOPY POLYPECTOMY ABLATION performed by Jesse De La Rosa MD at [address] [place] [address] / Jaiden Mensah / STONE Left 2019    CYSTOSCOPY BILATERAL  RETROGRADE PYELOGRAM LEFT URETEROSCOPY LEFT URETERAL STENT INSERTION performed by Walker Hicks MD at [address]      partial hysterectomy.     NE EGD TRANSORAL BIOPSY SINGLE/MULTIPLE N/A 10/22/2018    EGD ESOPHAGOGASTRODUODENOSCOPY performed by Elen Ford MD at [place] [address] Bilateral 2019    SUPRACERVICAL HYSTERECTOMY WITH BILATERAL SALPINGECTOMY performed by Jadiel Corea MD at [address]     OB History    Para Term  AB Living   6       2 4   SAB TAB Ectopic Molar Multiple Live Births   2                # Outcome Date GA Lbr Marc/2nd Weight Sex Delivery Anes PTL Lv   6             5             4             3             2 SAB            1 SAB              Social History     Socioeconomic History    Marital status: Single     Spouse name: Not on file    Number of children: Not on file    Years of education: Not on file    Highest education level: Not on file   Occupational History    Not on file   Social Needs    Financial resource strain: Not on file    Food insecurity     Worry: Not on file     Inability: Not on file    Transportation needs     Medical: Not on file     Non-medical: Not on file   Tobacco Use    Smoking status: Current Every Day Smoker     Packs/day: 0.50     Types: Cigarettes    Smokeless tobacco: Never Used   Substance and Sexual Activity    Alcohol use: No    Drug use: No    Sexual activity: Not on file   Lifestyle    Physical activity     Days per week: Not on file     Minutes per session: Not on file    Stress: Not on file   Relationships    Social connections     Talks on phone: Not on file     Gets together: Not on file     Attends [employer] service: Not on file     Active member of club or organization: Not on file     Attends meetings of clubs or organizations: Not on file     Relationship status: Not on file    Intimate partner violence     Fear of current or ex partner: Not on file     Emotionally abused: Not on file     Physically abused: Not on file     Forced sexual activity: Not on file   Other Topics Concern    Not on file   Social History Narrative    Not on file     Allergies   Allergen Reactions    Codeine Hives, Other (See Comments) and Itching     \"freak out\"    Penicillins     Robaxin [Methocarbamol] Other (See Comments)     Migraine, stomach cramps, pain all over body    Diazepam Anxiety     Outpatient Medications Marked as Taking for the 9/29/20 encounter (Office Visit) with Duran Bagley MD   Medication Sig Dispense Refill    sulfamethoxazole-trimethoprim (BACTRIM DS;SEPTRA DS) 800-160 MG per tablet Take 1 tablet by mouth 2 times daily for 10 days 20 tablet 0    phenazopyridine (PYRIDIUM) 200 MG tablet Take 1 tablet by mouth 3 times daily as needed for Pain 6 tablet 0     Family History   Problem Relation Age of Onset    Breast Cancer Maternal Aunt     Hypertension Maternal Uncle          Review ofSystems:  Review of Systems   Constitutional: Negative for appetite change, chills, fatigue, fever and unexpected weight change. HENT: Negative for ear pain, hearing loss, mouth sores, sore throat and trouble swallowing. Eyes: Negative for photophobia and visual disturbance. Respiratory: Negative for apnea, cough, chest tightness, shortness of breath and wheezing. Cardiovascular: Negative for chest pain, palpitations and leg swelling. Gastrointestinal: Negative for abdominal distention, abdominal pain, anal bleeding, blood in stool, constipation, diarrhea, nausea, rectal pain and vomiting. Endocrine: Negative for cold intolerance, heat intolerance, polydipsia, polyphagia and polyuria. Genitourinary: Positive for dysuria, frequency and urgency. Negative for difficulty urinating, dyspareunia, enuresis, genital sores, hematuria, menstrual problem, pelvic pain, vaginal bleeding, vaginal discharge and vaginal pain. Musculoskeletal: Negative for arthralgias, back pain, joint swelling and myalgias. Skin: Negative for color change and rash.    Neurological: Negative for dizziness, seizures, syncope, light-headedness and headaches. Psychiatric/Behavioral: Negative for agitation, behavioral problems, confusion, decreased concentration, dysphoric mood, hallucinations, self-injury, sleep disturbance and suicidal ideas. The patient is not nervous/anxious and is not hyperactive. Physical Exam:  /86   Temp 97.1 °F (36.2 °C)   Ht 5' 4\" (1.626 m)   Wt 266 lb (120.7 kg)   LMP 12/26/2018   BMI 45.66 kg/m²   BP Readings from Last 3 Encounters:   09/29/20 139/86   08/18/19 133/87   03/26/19 108/74      Body mass index is 45.66 kg/m². Physical Exam  Constitutional:       General: She is not in acute distress. Appearance: She is well-developed. HENT:      Head: Normocephalic and atraumatic. Mouth/Throat:      Pharynx: No oropharyngeal exudate. Eyes:      General: No scleral icterus. Right eye: No discharge. Left eye: No discharge. Conjunctiva/sclera: Conjunctivae normal.   Neck:      Musculoskeletal: Normal range of motion and neck supple. Thyroid: No thyromegaly. Cardiovascular:      Rate and Rhythm: Normal rate and regular rhythm. Heart sounds: Normal heart sounds. Pulmonary:      Effort: Pulmonary effort is normal. No respiratory distress. Breath sounds: Normal breath sounds. Abdominal:      General: Bowel sounds are normal. There is no distension. Palpations: Abdomen is soft. There is no mass. Tenderness: There is no abdominal tenderness. There is no guarding or rebound. Genitourinary:     Labia:         Right: No rash, tenderness, lesion or injury. Left: No rash, tenderness, lesion or injury. Vagina: Normal. No signs of injury and foreign body. No vaginal discharge, erythema or tenderness. Cervix: No cervical motion tenderness, discharge or friability. Uterus: Absent. Adnexa:         Right: No mass, tenderness or fullness. Left: No mass, tenderness or fullness.         Rectum: No external hemorrhoid. Skin:     General: Skin is warm. Coloration: Skin is not pale. Findings: No erythema or rash. Neurological:      Mental Status: She is alert and oriented to person, place, and time. Psychiatric:         Behavior: Behavior normal. Behavior is cooperative. Thought Content: Thought content normal.         Judgment: Judgment normal.             Assessment/Plan  1. Acute cystitis with hematuria  - sulfamethoxazole-trimethoprim (BACTRIM DS;SEPTRA DS) 800-160 MG per tablet; Take 1 tablet by mouth 2 times daily for 10 days  Dispense: 20 tablet; Refill: 0  - phenazopyridine (PYRIDIUM) 200 MG tablet; Take 1 tablet by mouth 3 times daily as needed for Pain  Dispense: 6 tablet; Refill: 0  - Culture, Urine    2. Screening for breast cancer  - [facility]UAN MIGUEL DIGITAL SCREEN W OR WO CAD BILATERAL; Future    3. Encounter for screening mammogram for malignant neoplasm of breast   - [facility]UAN MIGUEL DIGITAL SCREEN W OR WO CAD BILATERAL; Future    4. Increased abdominal girth  Check ovaries on pelvic ultrasound   Follow-up with PCP for further work-up  - US PELVIS COMPLETE; Future      Return in about 2 weeks (around 10/13/2020).  for annual exam and pap smear

## 2020-09-29 NOTE — TELEPHONE ENCOUNTER
Reason for Disposition   Side (flank) or lower back pain present    Answer Assessment - Initial Assessment Questions  1. SEVERITY: \"How bad is the pain? \"  (e.g., Scale 1-10; mild, moderate, or severe)    - MILD (1-3): complains slightly about urination hurting    - MODERATE (4-7): interferes with normal activities      - SEVERE (8-10): excruciating, unwilling or unable to urinate because of the pain       5/10  2. FREQUENCY: \"How many times have you had painful urination today? \"       Each time  3. PATTERN: \"Is pain present every time you urinate or just sometimes? \"       Every time  4. ONSET: \"When did the painful urination start? \"       2 days   5. FEVER: \"Do you have a fever? \" If so, ask: \"What is your temperature, how was it measured, and when did it start? \"      no  6. PAST UTI: \"Have you had a urine infection before? \" If so, ask: \"When was the last time? \" and \"What happened that time? \"       Yes after hysterectomy   7. CAUSE: \"What do you think is causing the painful urination? \"  (e.g., UTI, scratch, Herpes sore)      uti  8. OTHER SYMPTOMS: \"Do you have any other symptoms? \" (e.g., flank pain, vaginal discharge, genital sores, urgency, blood in urine)      Urgency   9. PREGNANCY: \"Is there any chance you are pregnant? \" \"When was your last menstrual period? \"      no    Protocols used: URINATION PAIN [facility]    Patient called pre-service center [address] with red flag complaint. Brief description of triage: pt is having UTI symptoms: urgency, flank pain, 5/10 pain with urination. Triage indicates for patient to be seen today in office    Care advice provided, patient verbalizes understanding; denies any other questions or concerns; instructed to call back for any new or worsening symptoms. Writer provided warm transfer to Brittni Yin at [facility] for appointment scheduling. Attention Provider: Thank you for allowing me to participate in the care of your patient.  The patient

## 2020-10-01 LAB
ORGANISM: ABNORMAL
URINE CULTURE, ROUTINE: ABNORMAL

## 2020-10-05 RX ORDER — PHENAZOPYRIDINE HYDROCHLORIDE 200 MG/1
200 TABLET, FILM COATED ORAL 3 TIMES DAILY PRN
Qty: 6 TABLET | Refills: 0 | Status: SHIPPED | OUTPATIENT
Start: 2020-10-05 | End: 2020-10-08

## 2021-01-26 ENCOUNTER — HOSPITAL ENCOUNTER (EMERGENCY)
Age: 51
Discharge: HOME | End: 2021-01-26
Attending: EMERGENCY MEDICINE
Payer: COMMERCIAL

## 2021-01-26 VITALS
WEIGHT: 274 LBS | HEART RATE: 98 BPM | RESPIRATION RATE: 14 BRPM | OXYGEN SATURATION: 99 % | DIASTOLIC BLOOD PRESSURE: 95 MMHG | SYSTOLIC BLOOD PRESSURE: 153 MMHG | TEMPERATURE: 99.6 F | BODY MASS INDEX: 46.78 KG/M2 | HEIGHT: 64 IN

## 2021-01-26 DIAGNOSIS — K08.89 PAIN, DENTAL: Primary | ICD-10-CM

## 2021-01-26 PROCEDURE — 99283 EMERGENCY DEPT VISIT LOW MDM: CPT

## 2021-01-26 PROCEDURE — 6370000000 HC RX 637 (ALT 250 FOR IP): Performed by: EMERGENCY MEDICINE

## 2021-01-26 RX ORDER — CLINDAMYCIN HYDROCHLORIDE 150 MG/1
450 CAPSULE ORAL 3 TIMES DAILY
Qty: 90 CAPSULE | Refills: 0 | Status: SHIPPED | OUTPATIENT
Start: 2021-01-26 | End: 2021-02-05

## 2021-01-26 RX ORDER — CLINDAMYCIN HYDROCHLORIDE 300 MG/1
300 CAPSULE ORAL EVERY 6 HOURS SCHEDULED
Status: DISCONTINUED | OUTPATIENT
Start: 2021-01-26 | End: 2021-01-26

## 2021-01-26 RX ORDER — CLINDAMYCIN HYDROCHLORIDE 300 MG/1
300 CAPSULE ORAL ONCE
Status: COMPLETED | OUTPATIENT
Start: 2021-01-26 | End: 2021-01-26

## 2021-01-26 RX ORDER — OXYCODONE HYDROCHLORIDE 5 MG/1
10 TABLET ORAL ONCE
Status: COMPLETED | OUTPATIENT
Start: 2021-01-26 | End: 2021-01-26

## 2021-01-26 RX ADMIN — OXYCODONE HYDROCHLORIDE 10 MG: 5 TABLET ORAL at 18:36

## 2021-01-26 RX ADMIN — CLINDAMYCIN HYDROCHLORIDE 300 MG: 300 CAPSULE ORAL at 18:36

## 2021-01-26 ASSESSMENT — ENCOUNTER SYMPTOMS
TROUBLE SWALLOWING: 0
VOMITING: 0
VOICE CHANGE: 0
NAUSEA: 0
SHORTNESS OF BREATH: 0
DIARRHEA: 0

## 2021-01-26 ASSESSMENT — PAIN DESCRIPTION - PAIN TYPE: TYPE: ACUTE PAIN

## 2021-01-26 ASSESSMENT — PAIN SCALES - GENERAL: PAINLEVEL_OUTOF10: 8

## 2021-01-26 ASSESSMENT — PAIN DESCRIPTION - LOCATION: LOCATION: TEETH

## 2021-01-26 NOTE — ED PROVIDER NOTES
[address] [facility]  eMERGENCY dEPARTMENT eNCOUnter      Pt Name: Mamta Henry  MRN: [id number]  [place] 1970  Date of evaluation: 1/26/2021  Provider: Meghan Ramey MD    CHIEF COMPLAINT       Chief Complaint   Patient presents with    Dental Pain         HISTORY OF PRESENT ILLNESS   (Location/Symptom, Timing/Onset, Context/Setting, Quality, Duration, Modifying Factors, Severity)  Note limiting factors. Mamta Henry is a 48 y.o. female with hx of Graves' disease and thyroid disease who presents due to 1 month of dental pain. Patient reports her pain is severe, constant worsening. Resector pressure on her left lower jaw worsens the pain nothing improves it. She denies any chest pain, shortness of breath, hemoptysis, palpitations, or other symptoms of Graves' disease. Patient reports her symptoms started after she \"cracked a tooth\". HPI    Nursing Notes were reviewed. REVIEW OFSYSTEMS    (2-9 systems for level 4, 10 or more for level 5)     Review of Systems   Constitutional: Negative for appetite change and fever. HENT: Positive for dental problem. Negative for trouble swallowing and voice change. Eyes: Negative for visual disturbance. Respiratory: Negative for shortness of breath. Cardiovascular: Negative for chest pain and palpitations. Gastrointestinal: Negative for diarrhea, nausea and vomiting. Genitourinary: Negative for dysuria. Musculoskeletal: Negative for gait problem. Neurological: Negative for seizures and syncope. Psychiatric/Behavioral: Negative for self-injury and suicidal ideas. Except as noted above the remainder of the review of systems was reviewed and negative.        PAST MEDICAL HISTORY     Past Medical History:   Diagnosis Date    Anxiety     Depression     Graves disease     Graves disease     Graves' disease     Graves' disease     History of blood transfusion     Hyperlipidemia     Rapid heart beat     Thyroid disease          SURGICAL HISTORY       Past Surgical History:   Procedure Laterality Date     SECTION      COLONOSCOPY  10/22/2018    COLONOSCOPY WITH BIOPSY performed by Aster Campos MD at [facility]  10/22/2018    COLONOSCOPY POLYPECTOMY ABLATION performed by Aster Campos MD at [address] [place] [address] / [address] [place] [address] Rd / Aleksandr Vyas Left 2019    CYSTOSCOPY BILATERAL  RETROGRADE PYELOGRAM LEFT URETEROSCOPY LEFT URETERAL STENT INSERTION performed by Cornelia Aguero MD at /[facility]      partial hysterectomy.     MS EGD TRANSORAL BIOPSY SINGLE/MULTIPLE N/A 10/22/2018    EGD ESOPHAGOGASTRODUODENOSCOPY performed by Aster Campos MD at [facility] Bilateral 2019    SUPRACERVICAL HYSTERECTOMY WITH BILATERAL SALPINGECTOMY performed by Laura Ford MD at [facility]       Previous Medications    BISACODYL (BISAC-EVAC) 10 MG SUPPOSITORY    Place one suppository per rectum qd as needed for constipation    FERROUS SULFATE DRIED (SLOW IRON) 160 (50 FE) MG TBCR EXTENDED RELEASE TABLET    Take 160 mg by mouth daily    FLUCONAZOLE (DIFLUCAN) 200 MG TABLET    Take one tablet po qd    IBUPROFEN (ADVIL;MOTRIN) 600 MG TABLET    Take 1 tablet by mouth every 6 hours as needed for Pain    ONDANSETRON (ZOFRAN-ODT) 4 MG DISINTEGRATING TABLET    Take 1 tablet by mouth every 8 hours as needed for Nausea    PROPRANOLOL (INDERAL) 10 MG TABLET    Take 10 mg by mouth 2 times daily as needed       ALLERGIES     Codeine, Penicillins, Robaxin [methocarbamol], and Diazepam    FAMILY HISTORY       Family History   Problem Relation Age of Onset    Breast Cancer Maternal Aunt     Hypertension Maternal Uncle           SOCIAL HISTORY       Social History     Socioeconomic History    Marital status: Single     Spouse name: None    Number of children: None    Years of education: None    Highest education level: None immediately if new or worsening symptoms occur. We have discussed the symptoms which are most concerning (e.g., changing or worsening pain, trouble swallowing or breathing, neck stiffness or fever) that necessitate immediate return. Procedures    FINAL IMPRESSION      1. Pain, dental          DISPOSITION/PLAN   DISPOSITION Decision To Discharge 01/26/2021 06:51:30 PM      PATIENT REFERRED TO:  See list of local dentists in your discharge instructions    In 1 day      [address] Emergency Department  [address] [place] [address]  [phone]    If symptoms worsen      DISCHARGE MEDICATIONS:  New Prescriptions    CLINDAMYCIN (CLEOCIN) 150 MG CAPSULE    Take 3 capsules by mouth 3 times daily for 10 days          (Please note that portions of this note were completed with a voice recognition program.  Efforts were made to edit the dictations but occasionally words aremis-transcribed. )    Meghan Ramey MD (electronically signed)  Attending Emergency Physician            Meghan Ramey MD  01/26/21 [id number]

## 2022-05-17 ENCOUNTER — HOSPITAL ENCOUNTER (EMERGENCY)
Age: 52
Discharge: HOME | End: 2022-05-17
Attending: EMERGENCY MEDICINE
Payer: COMMERCIAL

## 2022-05-17 ENCOUNTER — APPOINTMENT (OUTPATIENT)
Dept: CT IMAGING | Age: 52
End: 2022-05-17
Payer: COMMERCIAL

## 2022-05-17 VITALS
SYSTOLIC BLOOD PRESSURE: 138 MMHG | OXYGEN SATURATION: 98 % | WEIGHT: 265.3 LBS | HEIGHT: 64 IN | TEMPERATURE: 98.5 F | BODY MASS INDEX: 45.29 KG/M2 | RESPIRATION RATE: 18 BRPM | HEART RATE: 84 BPM | DIASTOLIC BLOOD PRESSURE: 78 MMHG

## 2022-05-17 DIAGNOSIS — R10.10 PAIN OF UPPER ABDOMEN: Primary | ICD-10-CM

## 2022-05-17 LAB
A/G RATIO: 1 (ref 1.1–2.2)
ALBUMIN SERPL-MCNC: 3.9 G/DL (ref 3.4–5)
ALP BLD-CCNC: 120 U/L (ref 40–129)
ALT SERPL-CCNC: 74 U/L (ref 10–40)
ANION GAP SERPL CALCULATED.3IONS-SCNC: 9 MMOL/L (ref 3–16)
AST SERPL-CCNC: 56 U/L (ref 15–37)
BASOPHILS ABSOLUTE: 0 K/UL (ref 0–0.2)
BASOPHILS RELATIVE PERCENT: 0.9 %
BILIRUB SERPL-MCNC: 0.3 MG/DL (ref 0–1)
BILIRUBIN URINE: NEGATIVE
BLOOD, URINE: NEGATIVE
BUN BLDV-MCNC: 8 MG/DL (ref 7–20)
CALCIUM SERPL-MCNC: 9.7 MG/DL (ref 8.3–10.6)
CHLORIDE BLD-SCNC: 100 MMOL/L (ref 99–110)
CLARITY: CLEAR
CO2: 27 MMOL/L (ref 21–32)
COLOR: YELLOW
CREAT SERPL-MCNC: 0.7 MG/DL (ref 0.6–1.1)
EKG ATRIAL RATE: 65 BPM
EKG DIAGNOSIS: NORMAL
EKG P AXIS: 36 DEGREES
EKG P-R INTERVAL: 152 MS
EKG Q-T INTERVAL: 430 MS
EKG QRS DURATION: 80 MS
EKG QTC CALCULATION (BAZETT): 447 MS
EKG R AXIS: 11 DEGREES
EKG T AXIS: 36 DEGREES
EKG VENTRICULAR RATE: 65 BPM
EOSINOPHILS ABSOLUTE: 0.2 K/UL (ref 0–0.6)
EOSINOPHILS RELATIVE PERCENT: 3.4 %
GFR AFRICAN AMERICAN: >60
GFR NON-AFRICAN AMERICAN: >60
GLUCOSE BLD-MCNC: 107 MG/DL (ref 70–99)
GLUCOSE URINE: NEGATIVE MG/DL
HCT VFR BLD CALC: 47.2 % (ref 36–48)
HEMOGLOBIN: 16.1 G/DL (ref 12–16)
KETONES, URINE: NEGATIVE MG/DL
LEUKOCYTE ESTERASE, URINE: NEGATIVE
LIPASE: 41 U/L (ref 13–60)
LYMPHOCYTES ABSOLUTE: 2.2 K/UL (ref 1–5.1)
LYMPHOCYTES RELATIVE PERCENT: 44.8 %
MCH RBC QN AUTO: 29.1 PG (ref 26–34)
MCHC RBC AUTO-ENTMCNC: 34.1 G/DL (ref 31–36)
MCV RBC AUTO: 85.5 FL (ref 80–100)
MICROSCOPIC EXAMINATION: NORMAL
MONOCYTES ABSOLUTE: 0.4 K/UL (ref 0–1.3)
MONOCYTES RELATIVE PERCENT: 9.2 %
NEUTROPHILS ABSOLUTE: 2 K/UL (ref 1.7–7.7)
NEUTROPHILS RELATIVE PERCENT: 41.7 %
NITRITE, URINE: NEGATIVE
PDW BLD-RTO: 13.8 % (ref 12.4–15.4)
PH UA: 6 (ref 5–8)
PLATELET # BLD: 208 K/UL (ref 135–450)
PMV BLD AUTO: 9.4 FL (ref 5–10.5)
POTASSIUM REFLEX MAGNESIUM: 5.1 MMOL/L (ref 3.5–5.1)
PROTEIN UA: NEGATIVE MG/DL
RBC # BLD: 5.52 M/UL (ref 4–5.2)
SODIUM BLD-SCNC: 136 MMOL/L (ref 136–145)
SPECIFIC GRAVITY UA: 1.02 (ref 1–1.03)
TOTAL PROTEIN: 7.8 G/DL (ref 6.4–8.2)
URINE REFLEX TO CULTURE: NORMAL
URINE TYPE: NORMAL
UROBILINOGEN, URINE: 0.2 E.U./DL
WBC # BLD: 4.9 K/UL (ref 4–11)

## 2022-05-17 PROCEDURE — 80053 COMPREHEN METABOLIC PANEL: CPT

## 2022-05-17 PROCEDURE — 81003 URINALYSIS AUTO W/O SCOPE: CPT

## 2022-05-17 PROCEDURE — 6360000004 HC RX CONTRAST MEDICATION: Performed by: EMERGENCY MEDICINE

## 2022-05-17 PROCEDURE — 93005 ELECTROCARDIOGRAM TRACING: CPT | Performed by: EMERGENCY MEDICINE

## 2022-05-17 PROCEDURE — 2580000003 HC RX 258: Performed by: EMERGENCY MEDICINE

## 2022-05-17 PROCEDURE — 96374 THER/PROPH/DIAG INJ IV PUSH: CPT

## 2022-05-17 PROCEDURE — 74177 CT ABD & PELVIS W/CONTRAST: CPT

## 2022-05-17 PROCEDURE — 6370000000 HC RX 637 (ALT 250 FOR IP): Performed by: EMERGENCY MEDICINE

## 2022-05-17 PROCEDURE — 83690 ASSAY OF LIPASE: CPT

## 2022-05-17 PROCEDURE — 36415 COLL VENOUS BLD VENIPUNCTURE: CPT

## 2022-05-17 PROCEDURE — 85025 COMPLETE CBC W/AUTO DIFF WBC: CPT

## 2022-05-17 PROCEDURE — 99285 EMERGENCY DEPT VISIT HI MDM: CPT

## 2022-05-17 PROCEDURE — 6360000002 HC RX W HCPCS: Performed by: EMERGENCY MEDICINE

## 2022-05-17 RX ORDER — FAMOTIDINE 10 MG
20 TABLET ORAL 2 TIMES DAILY
Qty: 120 TABLET | Refills: 0 | Status: SHIPPED | OUTPATIENT
Start: 2022-05-17 | End: 2022-06-16

## 2022-05-17 RX ORDER — ACETAMINOPHEN 325 MG/1
650 TABLET ORAL ONCE
Status: COMPLETED | OUTPATIENT
Start: 2022-05-17 | End: 2022-05-17

## 2022-05-17 RX ORDER — ONDANSETRON 2 MG/ML
4 INJECTION INTRAMUSCULAR; INTRAVENOUS ONCE
Status: COMPLETED | OUTPATIENT
Start: 2022-05-17 | End: 2022-05-17

## 2022-05-17 RX ORDER — 0.9 % SODIUM CHLORIDE 0.9 %
1000 INTRAVENOUS SOLUTION INTRAVENOUS ONCE
Status: COMPLETED | OUTPATIENT
Start: 2022-05-17 | End: 2022-05-17

## 2022-05-17 RX ORDER — DICYCLOMINE HYDROCHLORIDE 10 MG/1
10 CAPSULE ORAL EVERY 6 HOURS PRN
Qty: 20 CAPSULE | Refills: 0 | Status: SHIPPED | OUTPATIENT
Start: 2022-05-17 | End: 2022-05-22

## 2022-05-17 RX ADMIN — ONDANSETRON 4 MG: 2 INJECTION INTRAMUSCULAR; INTRAVENOUS at 09:03

## 2022-05-17 RX ADMIN — SODIUM CHLORIDE 1000 ML: 9 INJECTION, SOLUTION INTRAVENOUS at 09:03

## 2022-05-17 RX ADMIN — ACETAMINOPHEN 650 MG: 325 TABLET ORAL at 10:00

## 2022-05-17 RX ADMIN — IOPAMIDOL 80 ML: 755 INJECTION, SOLUTION INTRAVENOUS at 10:22

## 2022-05-17 ASSESSMENT — ENCOUNTER SYMPTOMS
COUGH: 0
SHORTNESS OF BREATH: 0
ABDOMINAL DISTENTION: 0

## 2022-05-17 ASSESSMENT — PAIN - FUNCTIONAL ASSESSMENT: PAIN_FUNCTIONAL_ASSESSMENT: NONE - DENIES PAIN

## 2022-05-17 ASSESSMENT — PAIN DESCRIPTION - DESCRIPTORS: DESCRIPTORS: THROBBING

## 2022-05-17 ASSESSMENT — PAIN SCALES - GENERAL: PAINLEVEL_OUTOF10: 7

## 2022-05-17 NOTE — ED PROVIDER NOTES
[address] [facility]          ATTENDING PHYSICIAN NOTE       Date of evaluation: 2022    Chief Complaint     Nausea (\"i got really Loreatha Stain and shaking and felt like i was going to pass out\" started hour and a half ago)      History of Present Illness     Nico Navarro is a 46 y.o. female who presents to the emergency department with upper abdominal discomfort that started 1 to 2 hours prior to arrival.  This is associated with nausea but no vomiting. Does not feel bloated or distended. Described the pain as constant, throbbing, and occasionally crampy in nature. She was able to tolerate a little bit of water. No other provoking or palliative features. She had initially when the pain started, it was so bad, that she felt like she might pass out. Otherwise no other feelings of presyncope, dizziness, chest pain, palpitations, shortness of breath. That sensation has passed and was short-lived. She is otherwise in her usual state of health. No sick contacts. No trauma. No travel. Review of Systems     Review of Systems   Constitutional: Negative for fever. Respiratory: Negative for cough and shortness of breath. Cardiovascular: Negative for leg swelling. Gastrointestinal: Negative for abdominal distention. Genitourinary: Positive for vaginal bleeding. Musculoskeletal: Negative for myalgias. Neurological: Positive for headaches. All other systems reviewed and are negative. Past Medical, Surgical, Family, and Social History     She has a past medical history of Anxiety, Depression, Graves disease, Graves disease, Graves' disease, Graves' disease, History of blood transfusion, Hyperlipidemia, Rapid heart beat, and Thyroid disease. She has a past surgical history that includes  section; pr egd transoral biopsy single/multiple (N/A, 10/22/2018); Colonoscopy (10/22/2018);  Colonoscopy (10/22/2018); primitivo and bso (cervix removed) (Bilateral, 1/11/2019); Hysterectomy; and CYSTOSCOPY INSERTION / REMOVAL STENT / STONE (Left, 1/21/2019). Her family history includes Breast Cancer in her maternal aunt; Hypertension in her maternal uncle. She reports that she has been smoking cigarettes. She has been smoking about 0.50 packs per day. She has never used smokeless tobacco. She reports that she does not drink alcohol and does not use drugs. Medications     Previous Medications    BISACODYL (BISAC-EVAC) 10 MG SUPPOSITORY    Place one suppository per rectum qd as needed for constipation    FERROUS SULFATE DRIED (SLOW IRON) 160 (50 FE) MG TBCR EXTENDED RELEASE TABLET    Take 160 mg by mouth daily    FLUCONAZOLE (DIFLUCAN) 200 MG TABLET    Take one tablet po qd    IBUPROFEN (ADVIL;MOTRIN) 600 MG TABLET    Take 1 tablet by mouth every 6 hours as needed for Pain    ONDANSETRON (ZOFRAN-ODT) 4 MG DISINTEGRATING TABLET    Take 1 tablet by mouth every 8 hours as needed for Nausea    PROPRANOLOL (INDERAL) 10 MG TABLET    Take 10 mg by mouth 2 times daily as needed       Allergies     She is allergic to codeine, penicillins, robaxin [methocarbamol], and diazepam.    Physical Exam     VITALS: /78   Pulse 84   Temp 98.5 °F (36.9 °C)   Resp 18   Ht 5' 4\" (1.626 m)   Wt 265 lb 4.8 oz (120.3 kg)   LMP 12/26/2018   SpO2 98%   BMI 45.54 kg/m²    General: Well developed, well nourished female in no acute distress. HEENT: Sclera white, conjunctiva pink, mucous membranes moist and oropharynx clear  Neck: Supple, no meningismus or JVD  Respirations: Unlabored with symmetric chest rise and fall  CV: Regular rate and rhythm with strong distal pulses  Abd: Mild upper abdominal discomfort to deep palpation, no rebound guarding or distention  : No CVA tenderness  Musculoskeletal: Moves all extremities with no signs of orthopedic trauma or edema. Skin: Warm and dry with no rashes or lesions. Neuro: Awake and alert with no focal neurologic deficits.   Normal speech and gait.  Psych: Mood and affect are normal.    Diagnostic Results     EKG   Indication: Upper abdominal pain. Interpreted by me. Normal sinus rhythm, normal axis and intervals, rate is 65. Low voltages are seen. No ST segment or T wave changes. Interpretation: Normal sinus rhythm no signs of acute ischemia or infarction. RADIOLOGY:  CT ABDOMEN PELVIS W IV CONTRAST Additional Contrast? None   Final Result      No cause for acute pain identified. Hepatic steatosis. Stable left adrenal nodule.                 LABS:   Results for orders placed or performed during the hospital encounter of 05/17/22   CBC with Auto Differential   Result Value Ref Range    WBC 4.9 4.0 - 11.0 K/uL    RBC 5.52 (H) 4.00 - 5.20 M/uL    Hemoglobin 16.1 (H) 12.0 - 16.0 g/dL    Hematocrit 47.2 36.0 - 48.0 %    MCV 85.5 80.0 - 100.0 fL    MCH 29.1 26.0 - 34.0 pg    MCHC 34.1 31.0 - 36.0 g/dL    RDW 13.8 12.4 - 15.4 %    Platelets 442 079 - 333 K/uL    MPV 9.4 5.0 - 10.5 fL    Neutrophils % 41.7 %    Lymphocytes % 44.8 %    Monocytes % 9.2 %    Eosinophils % 3.4 %    Basophils % 0.9 %    Neutrophils Absolute 2.0 1.7 - 7.7 K/uL    Lymphocytes Absolute 2.2 1.0 - 5.1 K/uL    Monocytes Absolute 0.4 0.0 - 1.3 K/uL    Eosinophils Absolute 0.2 0.0 - 0.6 K/uL    Basophils Absolute 0.0 0.0 - 0.2 K/uL   Comprehensive Metabolic Panel w/ Reflex to MG   Result Value Ref Range    Sodium 136 136 - 145 mmol/L    Potassium reflex Magnesium 5.1 3.5 - 5.1 mmol/L    Chloride 100 99 - 110 mmol/L    CO2 27 21 - 32 mmol/L    Anion Gap 9 3 - 16    Glucose 107 (H) 70 - 99 mg/dL    BUN 8 7 - 20 mg/dL    CREATININE 0.7 0.6 - 1.1 mg/dL    GFR Non-African American >60 >60    GFR African American >60 >60    Calcium 9.7 8.3 - 10.6 mg/dL    Total Protein 7.8 6.4 - 8.2 g/dL    Albumin 3.9 3.4 - 5.0 g/dL    Albumin/Globulin Ratio 1.0 (L) 1.1 - 2.2    Total Bilirubin 0.3 0.0 - 1.0 mg/dL    Alkaline Phosphatase 120 40 - 129 U/L    ALT 74 (H) 10 - 40 U/L    AST 56 (H) 15 - 37 U/L   Lipase   Result Value Ref Range    Lipase 41.0 13.0 - 60.0 U/L   Urinalysis with Reflex to Culture    Specimen: Urine   Result Value Ref Range    Color, UA Yellow Straw/Yellow    Clarity, UA Clear Clear    Glucose, Ur Negative Negative mg/dL    Bilirubin Urine Negative Negative    Ketones, Urine Negative Negative mg/dL    Specific Gravity, UA 1.025 1.005 - 1.030    Blood, Urine Negative Negative    pH, UA 6.0 5.0 - 8.0    Protein, UA Negative Negative mg/dL    Urobilinogen, Urine 0.2 <2.0 E.U./dL    Nitrite, Urine Negative Negative    Leukocyte Esterase, Urine Negative Negative    Microscopic Examination Not Indicated     Urine Type NotGiven     Urine Reflex to Culture Not Indicated    EKG 12 Lead   Result Value Ref Range    Ventricular Rate 65 BPM    Atrial Rate 65 BPM    P-R Interval 152 ms    QRS Duration 80 ms    Q-T Interval 430 ms    QTc Calculation (Bazett) 447 ms    P Axis 36 degrees    R Axis 11 degrees    T Axis 36 degrees    Diagnosis       EKG performed in ER and to be interpreted by ER physician. Confirmed by MD, ER ([id number]),  Nico Hicks ([id number]) on 5/17/2022 10:25:23 AM     RECENT VITALS:  BP: 138/78, Temp: 98.5 °F (36.9 °C), Pulse: 84, Resp: 18, SpO2: 98 %       ED Course     Nursing Notes, Past Medical Hx, Past Surgical Hx, Social Hx, Allergies, and Family Hx were reviewed. The patient was given the following medications:  Orders Placed This Encounter   Medications    0.9 % sodium chloride bolus    ondansetron (ZOFRAN) injection 4 mg    acetaminophen (TYLENOL) tablet 650 mg    iopamidol (ISOVUE-370) 76 % injection 80 mL    famotidine (PEPCID) 10 MG tablet     Sig: Take 2 tablets by mouth 2 times daily     Dispense:  120 tablet     Refill:  0    dicyclomine (BENTYL) 10 MG capsule     Sig: Take 1 capsule by mouth every 6 hours as needed (Bowel spasms)     Dispense:  20 capsule     Refill:  0     She was seen and examined on arrival to the treatment area.   Nursing notes and computerized medical records were reviewed. Frequently reassessed. Symptoms improved after Tylenol and IV fluids. I discussed results, aftercare, and return precautions, and she expresses understanding. MEDICAL DECISION MAKING / ASSESSMENT / Tania Moraes is a 46 y.o. female presents to the emergency department with rather cute onset of upper abdominal discomfort. Because of the abrupt onset, severity of symptoms, and abdominal surgery history, acute obstruction or volvulus was considered. This was fully evaluated with a CT scan. Hepatobiliary disease also within the differential diagnosis. Those laboratory studies are reassuring. Anginal equivalent was briefly considered, though considered unlikely. EKG is reassuring. Gastroesophageal disease remains within the differential diagnosis. We will start treating her with Pepcid and Bentyl for symptomatic care. She is encouraged to establish primary care for close follow-up. She describes on review of systems a couple of days of vaginal bleeding, not currently present. She is status post hysterectomy more than a year ago. Hemoglobin today is reassuring. She is advised to follow-up closely with her GYN. She does not have signs and symptoms of sexually transmitted disease. Clinical Impression     1.  Pain of upper abdomen        Disposition     PATIENT REFERRED TO:  [facility]  Emergency Department  [address] [place] [address]  [address]  [phone]    As needed, If symptoms worsen      DISCHARGE MEDICATIONS:  Discharge Medication List as of 5/17/2022 12:04 PM      START taking these medications    Details   famotidine (PEPCID) 10 MG tablet Take 2 tablets by mouth 2 times daily, Disp-120 tablet, R-0Print      dicyclomine (BENTYL) 10 MG capsule Take 1 capsule by mouth every 6 hours as needed (Bowel spasms), Disp-20 capsule, R-0Print             DISPOSITION Decision To Discharge 05/17/2022 11:32:27 AM          Sammie MESA Tamera Oviedo MD  05/17/22 [id number]

## 2022-07-07 NOTE — PROGRESS NOTES
Procedure completed egd and colonoscopy done report received from endo nurse egd negative left colitis with bx colon polyp removed no bleeding. Received  report from anesthesia nurse Dae: pt received 300 of ivf and 300 propofol pt awakens to name c/o throat then dozes back off bp sys 80 rechecked 92/50 will continue to monitor. sats 99% on 4 liers nc resp 21/min hr 78 NSR. Remains on side and informed pt on side until more awake.  Merline Cordero RN hand grasp, leg strength strong and equal bilaterally

## 2023-07-14 ENCOUNTER — HOSPITAL ENCOUNTER (EMERGENCY)
Age: 53
Discharge: HOME | End: 2023-07-14
Attending: EMERGENCY MEDICINE
Payer: COMMERCIAL

## 2023-07-14 VITALS
TEMPERATURE: 98.4 F | BODY MASS INDEX: 42.85 KG/M2 | HEART RATE: 82 BPM | RESPIRATION RATE: 18 BRPM | SYSTOLIC BLOOD PRESSURE: 159 MMHG | DIASTOLIC BLOOD PRESSURE: 95 MMHG | OXYGEN SATURATION: 97 % | HEIGHT: 64 IN | WEIGHT: 251 LBS

## 2023-07-14 DIAGNOSIS — R73.9 HYPERGLYCEMIA: Primary | ICD-10-CM

## 2023-07-14 LAB
ALBUMIN SERPL-MCNC: 3.8 G/DL (ref 3.4–5)
ALP SERPL-CCNC: 145 U/L (ref 40–129)
ALT SERPL-CCNC: 47 U/L (ref 10–40)
AMORPH SED URNS QL MICRO: ABNORMAL /HPF
ANION GAP SERPL CALCULATED.3IONS-SCNC: 10 MMOL/L (ref 3–16)
AST SERPL-CCNC: 25 U/L (ref 15–37)
BACTERIA URNS QL MICRO: ABNORMAL /HPF
BASE EXCESS BLDV CALC-SCNC: 3.1 MMOL/L (ref -2–3)
BASOPHILS # BLD: 0 K/UL (ref 0–0.2)
BASOPHILS NFR BLD: 0.8 %
BILIRUB DIRECT SERPL-MCNC: <0.2 MG/DL (ref 0–0.3)
BILIRUB INDIRECT SERPL-MCNC: ABNORMAL MG/DL (ref 0–1)
BILIRUB SERPL-MCNC: 0.6 MG/DL (ref 0–1)
BILIRUB UR QL STRIP.AUTO: NEGATIVE
BUN SERPL-MCNC: 9 MG/DL (ref 7–20)
CALCIUM SERPL-MCNC: 9.6 MG/DL (ref 8.3–10.6)
CHLORIDE SERPL-SCNC: 97 MMOL/L (ref 99–110)
CLARITY UR: CLEAR
CO2 BLDV-SCNC: 33 MMOL/L
CO2 SERPL-SCNC: 26 MMOL/L (ref 21–32)
COHGB MFR BLDV: 8.2 % (ref 0–1.5)
COLOR UR: YELLOW
CREAT SERPL-MCNC: 0.6 MG/DL (ref 0.6–1.1)
DEPRECATED RDW RBC AUTO: 13.6 % (ref 12.4–15.4)
DO-HGB MFR BLDV: 64.3 %
EOSINOPHIL # BLD: 0.2 K/UL (ref 0–0.6)
EOSINOPHIL NFR BLD: 3.1 %
EPI CELLS #/AREA URNS HPF: ABNORMAL /HPF (ref 0–5)
GFR SERPLBLD CREATININE-BSD FMLA CKD-EPI: >60 ML/MIN/{1.73_M2}
GLUCOSE BLD-MCNC: 151 MG/DL (ref 70–99)
GLUCOSE BLD-MCNC: 331 MG/DL (ref 70–99)
GLUCOSE BLD-MCNC: 345 MG/DL (ref 70–99)
GLUCOSE SERPL-MCNC: 374 MG/DL (ref 70–99)
GLUCOSE UR STRIP.AUTO-MCNC: >=1000 MG/DL
HCO3 BLDV-SCNC: 31 MMOL/L (ref 24–28)
HCT VFR BLD AUTO: 49.6 % (ref 36–48)
HGB BLD-MCNC: 16.7 G/DL (ref 12–16)
HGB UR QL STRIP.AUTO: ABNORMAL
KETONES UR STRIP.AUTO-MCNC: NEGATIVE MG/DL
LEUKOCYTE ESTERASE UR QL STRIP.AUTO: ABNORMAL
LIPASE SERPL-CCNC: 48 U/L (ref 13–60)
LYMPHOCYTES # BLD: 2.3 K/UL (ref 1–5.1)
LYMPHOCYTES NFR BLD: 41.3 %
MCH RBC QN AUTO: 28.7 PG (ref 26–34)
MCHC RBC AUTO-ENTMCNC: 33.7 G/DL (ref 31–36)
MCV RBC AUTO: 85.1 FL (ref 80–100)
METHGB MFR BLDV: 0.5 % (ref 0–1.5)
MONOCYTES # BLD: 0.4 K/UL (ref 0–1.3)
MONOCYTES NFR BLD: 7.7 %
NEUTROPHILS # BLD: 2.7 K/UL (ref 1.7–7.7)
NEUTROPHILS NFR BLD: 47.1 %
NITRITE UR QL STRIP.AUTO: POSITIVE
PCO2 BLDV: 58.1 MMHG (ref 41–51)
PERFORMED ON: ABNORMAL
PH BLDV: 7.34 [PH] (ref 7.35–7.45)
PH UR STRIP.AUTO: 6 [PH] (ref 5–8)
PLATELET # BLD AUTO: 171 K/UL (ref 135–450)
PMV BLD AUTO: 10 FL (ref 5–10.5)
PO2 BLDV: <30 MMHG (ref 25–40)
POTASSIUM SERPL-SCNC: 4.3 MMOL/L (ref 3.5–5.1)
PROT SERPL-MCNC: 7.3 G/DL (ref 6.4–8.2)
PROT UR STRIP.AUTO-MCNC: ABNORMAL MG/DL
RBC # BLD AUTO: 5.83 M/UL (ref 4–5.2)
RBC #/AREA URNS HPF: ABNORMAL /HPF (ref 0–4)
SAO2 % BLDV: 30 %
SODIUM SERPL-SCNC: 133 MMOL/L (ref 136–145)
SP GR UR STRIP.AUTO: 1.02 (ref 1–1.03)
UA COMPLETE W REFLEX CULTURE PNL UR: YES
UA DIPSTICK W REFLEX MICRO PNL UR: YES
URN SPEC COLLECT METH UR: ABNORMAL
UROBILINOGEN UR STRIP-ACNC: 0.2 E.U./DL
WBC # BLD AUTO: 5.6 K/UL (ref 4–11)
WBC #/AREA URNS HPF: ABNORMAL /HPF (ref 0–5)

## 2023-07-14 PROCEDURE — 6370000000 HC RX 637 (ALT 250 FOR IP): Performed by: EMERGENCY MEDICINE

## 2023-07-14 PROCEDURE — 96374 THER/PROPH/DIAG INJ IV PUSH: CPT

## 2023-07-14 PROCEDURE — 81001 URINALYSIS AUTO W/SCOPE: CPT

## 2023-07-14 PROCEDURE — 6370000000 HC RX 637 (ALT 250 FOR IP): Performed by: PHYSICIAN ASSISTANT

## 2023-07-14 PROCEDURE — 2580000003 HC RX 258: Performed by: EMERGENCY MEDICINE

## 2023-07-14 PROCEDURE — 82803 BLOOD GASES ANY COMBINATION: CPT

## 2023-07-14 PROCEDURE — 80076 HEPATIC FUNCTION PANEL: CPT

## 2023-07-14 PROCEDURE — 80048 BASIC METABOLIC PNL TOTAL CA: CPT

## 2023-07-14 PROCEDURE — 36415 COLL VENOUS BLD VENIPUNCTURE: CPT

## 2023-07-14 PROCEDURE — 83690 ASSAY OF LIPASE: CPT

## 2023-07-14 PROCEDURE — 87088 URINE BACTERIA CULTURE: CPT

## 2023-07-14 PROCEDURE — 87086 URINE CULTURE/COLONY COUNT: CPT

## 2023-07-14 PROCEDURE — 99284 EMERGENCY DEPT VISIT MOD MDM: CPT

## 2023-07-14 PROCEDURE — 87186 SC STD MICRODIL/AGAR DIL: CPT

## 2023-07-14 PROCEDURE — 85025 COMPLETE CBC W/AUTO DIFF WBC: CPT

## 2023-07-14 RX ORDER — SODIUM CHLORIDE, SODIUM LACTATE, POTASSIUM CHLORIDE, AND CALCIUM CHLORIDE .6; .31; .03; .02 G/100ML; G/100ML; G/100ML; G/100ML
1000 INJECTION, SOLUTION INTRAVENOUS ONCE
Status: COMPLETED | OUTPATIENT
Start: 2023-07-14 | End: 2023-07-14

## 2023-07-14 RX ORDER — CEPHALEXIN 500 MG/1
500 CAPSULE ORAL ONCE
Status: COMPLETED | OUTPATIENT
Start: 2023-07-14 | End: 2023-07-14

## 2023-07-14 RX ORDER — CEPHALEXIN 500 MG/1
500 CAPSULE ORAL 4 TIMES DAILY
Qty: 20 CAPSULE | Refills: 0 | Status: ON HOLD | OUTPATIENT
Start: 2023-07-14 | End: 2023-07-19 | Stop reason: HOSPADM

## 2023-07-14 RX ADMIN — SODIUM CHLORIDE, POTASSIUM CHLORIDE, SODIUM LACTATE AND CALCIUM CHLORIDE 1000 ML: 600; 310; 30; 20 INJECTION, SOLUTION INTRAVENOUS at 14:21

## 2023-07-14 RX ADMIN — INSULIN HUMAN 5 UNITS: 100 INJECTION, SOLUTION PARENTERAL at 14:15

## 2023-07-14 RX ADMIN — CEPHALEXIN 500 MG: 500 CAPSULE ORAL at 15:09

## 2023-07-14 ASSESSMENT — ENCOUNTER SYMPTOMS
NAUSEA: 0
DIARRHEA: 0
ABDOMINAL PAIN: 1
COUGH: 0
VOMITING: 0
SHORTNESS OF BREATH: 0

## 2023-07-14 ASSESSMENT — PAIN - FUNCTIONAL ASSESSMENT: PAIN_FUNCTIONAL_ASSESSMENT: NONE - DENIES PAIN

## 2023-07-14 NOTE — ED PROVIDER NOTES
[address] [facility] ENCOUNTER          PHYSICIAN ASSISTANT NOTE       Date of evaluation: 7/14/2023    Chief Complaint     Abnormal Lab (Was being treated for a UTI - had finished macrobid but urine still looked cloudy, UA from urgent care showed >2000 glucose and FSBS 363, c/o thrist and blurred vision)      History of Present Illness     Dhara Lane is a 48 y.o. female with a past medical history of Graves' disease, hyperlipidemia, thyroid disease who was seen at urgent care 3 weeks ago for lower abdominal discomfort, increased urinary frequency, discomfort after urination, blood in her urine and was prescribed Macrobid. She took the complete course without improvement in symptoms. She returned there today and UA was leukocyte positive, positive for blood but without nitrites. Blood sugar there today was 363. Patient is not a known diabetic. She does endorse some increased thirst recently. She does also note some lower abdominal discomfort. She comes to this emergency department for further evaluation of likely new diagnosis of type 2 diabetes as well as urinary symptoms. She denies pain elsewhere including her chest.  She denies shortness of breath, fevers, cough, change in bowel or bladder function aside from what is mentioned above. Review of Systems     Review of Systems   Constitutional:  Negative for chills and fever. Respiratory:  Negative for cough and shortness of breath. Cardiovascular:  Negative for chest pain. Gastrointestinal:  Positive for abdominal pain. Negative for diarrhea, nausea and vomiting. Genitourinary:  Positive for dysuria, frequency and hematuria. Neurological:  Negative for headaches.      Past Medical, Surgical, Family, and Social History     She has a past medical history of Anxiety, Depression, Graves disease, Graves disease, Graves' disease, Graves' disease, History of blood transfusion, Hyperlipidemia, Rapid heart beat, and Thyroid

## 2023-07-14 NOTE — ED PROVIDER NOTES
ED Attending Attestation Note     Date of evaluation: 2023    This patient was seen by the advance practice provider. I have seen and examined the patient, agree with the workup, evaluation, management and diagnosis. The care plan has been discussed. Patient History     Chief Complaint: Abnormal Lab (Was being treated for a UTI - had finished macrobid but urine still looked cloudy, UA from urgent care showed >2000 glucose and FSBS 363, c/o thrist and blurred vision)      HPI: Dorn Cockayne is a 48 y.o. who presents to the Emergency Department, referred from a local urgent care due to concerns for hyperglycemia. The patient went to the urgent care because she had been experiencing urinary frequency, urgency, and dysuria, and was concerned that she had a urinary tract infection. Her urinalysis there showed significant glucosuria, and a fingerstick blood glucose was in the high 300s. On further questioning she also does endorse polydipsia and some increased blurriness of vision over the past couple of months. She has been diagnosed with prediabetes based on hemoglobin A1c of 6.1 earlier this spring, but has no personal history of diabetes mellitus. She has a past medical history of Anxiety, Depression, Graves disease, Graves disease, Graves' disease, Graves' disease, History of blood transfusion, Hyperlipidemia, Rapid heart beat, and Thyroid disease. She has a past surgical history that includes  section; pr egd transoral biopsy single/multiple (N/A, 10/22/2018); Colonoscopy (10/22/2018); Colonoscopy (10/22/2018); Total abdominal hysterectomy w/ bilateral salpingoophorectomy (Bilateral, 2019); Hysterectomy; and CYSTOSCOPY INSERTION / REMOVAL STENT / STONE (Left, 2019). family history includes Breast Cancer in her maternal aunt; Hypertension in her maternal uncle. She reports that she has been smoking cigarettes. She has been smoking an average of .75 packs per day.  She

## 2023-07-14 NOTE — DISCHARGE INSTRUCTIONS
As we discussed, follow-up with primary care with the number provided above to establish care and for assessment of new diagnosis of diabetes. Do this is soon as possible. Take metformin as directed until that time. Return to the emergency department sooner with chest pain, shortness of breath, abdominal pain, other concerning symptoms.

## 2023-07-16 LAB
BACTERIA UR CULT: ABNORMAL
ORGANISM: ABNORMAL

## 2023-07-18 ENCOUNTER — HOSPITAL ENCOUNTER (OUTPATIENT)
Age: 53
Setting detail: OBSERVATION
Discharge: HOME | End: 2023-07-19
Attending: EMERGENCY MEDICINE | Admitting: INTERNAL MEDICINE
Payer: COMMERCIAL

## 2023-07-18 ENCOUNTER — TELEPHONE (OUTPATIENT)
Dept: ENDOCRINOLOGY | Age: 53
End: 2023-07-18

## 2023-07-18 DIAGNOSIS — R73.9 HYPERGLYCEMIA: Primary | ICD-10-CM

## 2023-07-18 LAB
ALBUMIN SERPL-MCNC: 3.5 G/DL (ref 3.4–5)
ALP SERPL-CCNC: 128 U/L (ref 40–129)
ALT SERPL-CCNC: 45 U/L (ref 10–40)
ANION GAP SERPL CALCULATED.3IONS-SCNC: 12 MMOL/L (ref 3–16)
AST SERPL-CCNC: 28 U/L (ref 15–37)
BASOPHILS # BLD: 0.1 K/UL (ref 0–0.2)
BASOPHILS NFR BLD: 1.2 %
BILIRUB DIRECT SERPL-MCNC: <0.2 MG/DL (ref 0–0.3)
BILIRUB INDIRECT SERPL-MCNC: ABNORMAL MG/DL (ref 0–1)
BILIRUB SERPL-MCNC: 0.4 MG/DL (ref 0–1)
BILIRUB UR QL STRIP.AUTO: NEGATIVE
BUN SERPL-MCNC: 8 MG/DL (ref 7–20)
CALCIUM SERPL-MCNC: 9.4 MG/DL (ref 8.3–10.6)
CHLORIDE SERPL-SCNC: 96 MMOL/L (ref 99–110)
CLARITY UR: CLEAR
CO2 SERPL-SCNC: 23 MMOL/L (ref 21–32)
COLOR UR: YELLOW
CREAT SERPL-MCNC: 0.5 MG/DL (ref 0.6–1.1)
DEPRECATED RDW RBC AUTO: 14 % (ref 12.4–15.4)
EKG ATRIAL RATE: 76 BPM
EKG DIAGNOSIS: NORMAL
EKG P AXIS: 39 DEGREES
EKG P-R INTERVAL: 172 MS
EKG Q-T INTERVAL: 410 MS
EKG QRS DURATION: 80 MS
EKG QTC CALCULATION (BAZETT): 461 MS
EKG R AXIS: 11 DEGREES
EKG T AXIS: 29 DEGREES
EKG VENTRICULAR RATE: 76 BPM
EOSINOPHIL # BLD: 0.1 K/UL (ref 0–0.6)
EOSINOPHIL NFR BLD: 2.1 %
GFR SERPLBLD CREATININE-BSD FMLA CKD-EPI: >60 ML/MIN/{1.73_M2}
GLUCOSE BLD-MCNC: 206 MG/DL (ref 70–99)
GLUCOSE BLD-MCNC: 250 MG/DL (ref 70–99)
GLUCOSE SERPL-MCNC: 409 MG/DL (ref 70–99)
GLUCOSE UR STRIP.AUTO-MCNC: >=1000 MG/DL
HCT VFR BLD AUTO: 49.4 % (ref 36–48)
HGB BLD-MCNC: 16.4 G/DL (ref 12–16)
HGB UR QL STRIP.AUTO: NEGATIVE
KETONES UR STRIP.AUTO-MCNC: NEGATIVE MG/DL
LEUKOCYTE ESTERASE UR QL STRIP.AUTO: NEGATIVE
LIPASE SERPL-CCNC: 44 U/L (ref 13–60)
LYMPHOCYTES # BLD: 2 K/UL (ref 1–5.1)
LYMPHOCYTES NFR BLD: 34.9 %
MCH RBC QN AUTO: 28.2 PG (ref 26–34)
MCHC RBC AUTO-ENTMCNC: 33.3 G/DL (ref 31–36)
MCV RBC AUTO: 84.8 FL (ref 80–100)
MONOCYTES # BLD: 0.3 K/UL (ref 0–1.3)
MONOCYTES NFR BLD: 5.9 %
NEUTROPHILS # BLD: 3.3 K/UL (ref 1.7–7.7)
NEUTROPHILS NFR BLD: 55.9 %
NITRITE UR QL STRIP.AUTO: NEGATIVE
PERFORMED ON: ABNORMAL
PERFORMED ON: ABNORMAL
PH UR STRIP.AUTO: 6 [PH] (ref 5–8)
PLATELET # BLD AUTO: 190 K/UL (ref 135–450)
PMV BLD AUTO: 10.3 FL (ref 5–10.5)
POTASSIUM SERPL-SCNC: 4.1 MMOL/L (ref 3.5–5.1)
PROT SERPL-MCNC: 6.9 G/DL (ref 6.4–8.2)
PROT UR STRIP.AUTO-MCNC: NEGATIVE MG/DL
RBC # BLD AUTO: 5.82 M/UL (ref 4–5.2)
SARS-COV-2 RDRP RESP QL NAA+PROBE: NOT DETECTED
SODIUM SERPL-SCNC: 131 MMOL/L (ref 136–145)
SP GR UR STRIP.AUTO: 1.01 (ref 1–1.03)
UA COMPLETE W REFLEX CULTURE PNL UR: ABNORMAL
UA DIPSTICK W REFLEX MICRO PNL UR: ABNORMAL
URN SPEC COLLECT METH UR: ABNORMAL
UROBILINOGEN UR STRIP-ACNC: 0.2 E.U./DL
WBC # BLD AUTO: 5.9 K/UL (ref 4–11)

## 2023-07-18 PROCEDURE — G0378 HOSPITAL OBSERVATION PER HR: HCPCS

## 2023-07-18 PROCEDURE — 96361 HYDRATE IV INFUSION ADD-ON: CPT

## 2023-07-18 PROCEDURE — 99285 EMERGENCY DEPT VISIT HI MDM: CPT

## 2023-07-18 PROCEDURE — 80076 HEPATIC FUNCTION PANEL: CPT

## 2023-07-18 PROCEDURE — 81003 URINALYSIS AUTO W/O SCOPE: CPT

## 2023-07-18 PROCEDURE — 96360 HYDRATION IV INFUSION INIT: CPT

## 2023-07-18 PROCEDURE — 83690 ASSAY OF LIPASE: CPT

## 2023-07-18 PROCEDURE — 6370000000 HC RX 637 (ALT 250 FOR IP): Performed by: FAMILY MEDICINE

## 2023-07-18 PROCEDURE — 2580000003 HC RX 258: Performed by: INTERNAL MEDICINE

## 2023-07-18 PROCEDURE — 6370000000 HC RX 637 (ALT 250 FOR IP): Performed by: EMERGENCY MEDICINE

## 2023-07-18 PROCEDURE — 93005 ELECTROCARDIOGRAM TRACING: CPT | Performed by: EMERGENCY MEDICINE

## 2023-07-18 PROCEDURE — 84443 ASSAY THYROID STIM HORMONE: CPT

## 2023-07-18 PROCEDURE — 36415 COLL VENOUS BLD VENIPUNCTURE: CPT

## 2023-07-18 PROCEDURE — 87635 SARS-COV-2 COVID-19 AMP PRB: CPT

## 2023-07-18 PROCEDURE — 6370000000 HC RX 637 (ALT 250 FOR IP): Performed by: INTERNAL MEDICINE

## 2023-07-18 PROCEDURE — 80048 BASIC METABOLIC PNL TOTAL CA: CPT

## 2023-07-18 PROCEDURE — 85025 COMPLETE CBC W/AUTO DIFF WBC: CPT

## 2023-07-18 RX ORDER — SODIUM CHLORIDE 9 MG/ML
INJECTION, SOLUTION INTRAVENOUS PRN
Status: DISCONTINUED | OUTPATIENT
Start: 2023-07-18 | End: 2023-07-19 | Stop reason: HOSPADM

## 2023-07-18 RX ORDER — INSULIN LISPRO 100 [IU]/ML
0-4 INJECTION, SOLUTION INTRAVENOUS; SUBCUTANEOUS
Status: DISCONTINUED | OUTPATIENT
Start: 2023-07-19 | End: 2023-07-19 | Stop reason: HOSPADM

## 2023-07-18 RX ORDER — ONDANSETRON 4 MG/1
4 TABLET, ORALLY DISINTEGRATING ORAL ONCE
Status: COMPLETED | OUTPATIENT
Start: 2023-07-18 | End: 2023-07-18

## 2023-07-18 RX ORDER — ONDANSETRON 2 MG/ML
4 INJECTION INTRAMUSCULAR; INTRAVENOUS EVERY 6 HOURS PRN
Status: DISCONTINUED | OUTPATIENT
Start: 2023-07-18 | End: 2023-07-19 | Stop reason: HOSPADM

## 2023-07-18 RX ORDER — DEXTROSE MONOHYDRATE 100 MG/ML
INJECTION, SOLUTION INTRAVENOUS CONTINUOUS PRN
Status: DISCONTINUED | OUTPATIENT
Start: 2023-07-18 | End: 2023-07-19 | Stop reason: HOSPADM

## 2023-07-18 RX ORDER — SODIUM CHLORIDE 9 MG/ML
INJECTION, SOLUTION INTRAVENOUS CONTINUOUS
Status: DISCONTINUED | OUTPATIENT
Start: 2023-07-18 | End: 2023-07-19 | Stop reason: HOSPADM

## 2023-07-18 RX ORDER — HYOSCYAMINE SULFATE 0.125 MG
125 TABLET ORAL EVERY 4 HOURS PRN
Status: DISCONTINUED | OUTPATIENT
Start: 2023-07-18 | End: 2023-07-19 | Stop reason: HOSPADM

## 2023-07-18 RX ORDER — ALOGLIPTIN 12.5 MG/1
12.5 TABLET, FILM COATED ORAL DAILY
Status: DISCONTINUED | OUTPATIENT
Start: 2023-07-18 | End: 2023-07-19 | Stop reason: HOSPADM

## 2023-07-18 RX ORDER — POLYETHYLENE GLYCOL 3350 17 G/17G
17 POWDER, FOR SOLUTION ORAL DAILY PRN
Status: DISCONTINUED | OUTPATIENT
Start: 2023-07-18 | End: 2023-07-19 | Stop reason: HOSPADM

## 2023-07-18 RX ORDER — GLIPIZIDE 5 MG/1
5 TABLET ORAL
Status: DISCONTINUED | OUTPATIENT
Start: 2023-07-18 | End: 2023-07-19 | Stop reason: HOSPADM

## 2023-07-18 RX ORDER — INSULIN LISPRO 100 [IU]/ML
0-4 INJECTION, SOLUTION INTRAVENOUS; SUBCUTANEOUS NIGHTLY
Status: DISCONTINUED | OUTPATIENT
Start: 2023-07-18 | End: 2023-07-19 | Stop reason: HOSPADM

## 2023-07-18 RX ORDER — ACETAMINOPHEN 325 MG/1
650 TABLET ORAL EVERY 6 HOURS PRN
Status: DISCONTINUED | OUTPATIENT
Start: 2023-07-18 | End: 2023-07-19 | Stop reason: HOSPADM

## 2023-07-18 RX ORDER — ACETAMINOPHEN 500 MG
1000 TABLET ORAL
Status: COMPLETED | OUTPATIENT
Start: 2023-07-18 | End: 2023-07-18

## 2023-07-18 RX ORDER — NICOTINE 21 MG/24HR
1 PATCH, TRANSDERMAL 24 HOURS TRANSDERMAL DAILY
Status: DISCONTINUED | OUTPATIENT
Start: 2023-07-18 | End: 2023-07-19 | Stop reason: HOSPADM

## 2023-07-18 RX ORDER — GLUCAGON 1 MG/ML
1 KIT INJECTION PRN
Status: DISCONTINUED | OUTPATIENT
Start: 2023-07-18 | End: 2023-07-19 | Stop reason: HOSPADM

## 2023-07-18 RX ORDER — ACETAMINOPHEN 650 MG/1
650 SUPPOSITORY RECTAL EVERY 6 HOURS PRN
Status: DISCONTINUED | OUTPATIENT
Start: 2023-07-18 | End: 2023-07-19 | Stop reason: HOSPADM

## 2023-07-18 RX ORDER — SODIUM CHLORIDE 0.9 % (FLUSH) 0.9 %
5-40 SYRINGE (ML) INJECTION PRN
Status: DISCONTINUED | OUTPATIENT
Start: 2023-07-18 | End: 2023-07-19 | Stop reason: HOSPADM

## 2023-07-18 RX ORDER — SODIUM CHLORIDE 0.9 % (FLUSH) 0.9 %
5-40 SYRINGE (ML) INJECTION EVERY 12 HOURS SCHEDULED
Status: DISCONTINUED | OUTPATIENT
Start: 2023-07-18 | End: 2023-07-19 | Stop reason: HOSPADM

## 2023-07-18 RX ORDER — LOPERAMIDE HYDROCHLORIDE 2 MG/1
2 CAPSULE ORAL 4 TIMES DAILY PRN
Status: DISCONTINUED | OUTPATIENT
Start: 2023-07-18 | End: 2023-07-19 | Stop reason: HOSPADM

## 2023-07-18 RX ORDER — ENOXAPARIN SODIUM 100 MG/ML
30 INJECTION SUBCUTANEOUS 2 TIMES DAILY
Status: DISCONTINUED | OUTPATIENT
Start: 2023-07-18 | End: 2023-07-19 | Stop reason: HOSPADM

## 2023-07-18 RX ORDER — ONDANSETRON 4 MG/1
4 TABLET, ORALLY DISINTEGRATING ORAL EVERY 8 HOURS PRN
Status: DISCONTINUED | OUTPATIENT
Start: 2023-07-18 | End: 2023-07-19 | Stop reason: HOSPADM

## 2023-07-18 RX ADMIN — SODIUM CHLORIDE: 9 INJECTION, SOLUTION INTRAVENOUS at 17:42

## 2023-07-18 RX ADMIN — GLIPIZIDE 5 MG: 5 TABLET ORAL at 17:44

## 2023-07-18 RX ADMIN — SODIUM CHLORIDE, PRESERVATIVE FREE 10 ML: 5 INJECTION INTRAVENOUS at 21:01

## 2023-07-18 RX ADMIN — ACETAMINOPHEN 1000 MG: 500 TABLET ORAL at 08:03

## 2023-07-18 RX ADMIN — SODIUM CHLORIDE: 9 INJECTION, SOLUTION INTRAVENOUS at 14:56

## 2023-07-18 RX ADMIN — ONDANSETRON 4 MG: 4 TABLET, ORALLY DISINTEGRATING ORAL at 08:03

## 2023-07-18 RX ADMIN — ALOGLIPTIN 12.5 MG: 12.5 TABLET, FILM COATED ORAL at 17:44

## 2023-07-18 RX ADMIN — LOPERAMIDE HYDROCHLORIDE 2 MG: 2 CAPSULE ORAL at 21:00

## 2023-07-18 ASSESSMENT — PAIN DESCRIPTION - DESCRIPTORS: DESCRIPTORS: ACHING;DULL

## 2023-07-18 ASSESSMENT — PAIN DESCRIPTION - LOCATION: LOCATION: ABDOMEN;HEAD

## 2023-07-18 ASSESSMENT — PAIN SCALES - GENERAL: PAINLEVEL_OUTOF10: 4

## 2023-07-18 NOTE — PROGRESS NOTES
4 Eyes Skin Assessment     NAME:  Amanuel Dickinson  YOB: 1970  MEDICAL RECORD NUMBER:  [id number]    The patient is being assessed for  Admission    I agree that at least one RN has performed a thorough Head to Toe Skin Assessment on the patient. ALL assessment sites listed below have been assessed. Areas assessed by both nurses:    Head, Face, Ears, Shoulders, Back, Chest, Arms, Elbows, Hands, Sacrum. Buttock, Coccyx, Ischium, Legs. Feet and Heels, and Under Medical Devices         Does the Patient have a Wound?  No noted wound(s)       Melvin Prevention initiated by RN: No  Wound Care Orders initiated by RN: No    Pressure Injury (Stage 3,4, Unstageable, DTI, NWPT, and Complex wounds) if present, place Wound referral order by RN under : No    New Ostomies, if present place, Ostomy referral order under : No     Nurse 1 eSignature: Electronically signed by Pedro Linder RN on 7/18/23 at 5:50 PM EDT    **SHARE this note so that the co-signing nurse can place an eSignature**    Nurse 2 eSignature: Electronically signed by Mary Sierra RN on 7/18/23 at [id number] PM EDT

## 2023-07-18 NOTE — ED NOTES
ED TO INPATIENT SBAR HANDOFF    Patient Name: Jacklyn Zarate   :  1970  48 y.o. MRN:  [id number]  Preferred Name  Alice Nichols   ED Room #:  S26/Y72-13  Family/Caregiver Present no   Restraints no   Sitter no   Sepsis Risk Score Sepsis Risk Score: 0.59    Situation  Code Status: Full Code No additional code details. Allergies: Clindamycin/lincomycin, Codeine, Penicillins, Robaxin [methocarbamol], and Diazepam  Weight: Patient Vitals for the past 96 hrs (Last 3 readings):   Weight   23 0743 254 lb (115.2 kg)     Arrived from: home  Chief Complaint:   Chief Complaint   Patient presents with    Abdominal Pain     Pt started metformin and abx on . Since then has been having abd cramping, nausea, and diarrhea      Hospital Problem/Diagnosis:  Principal Problem:    Hyperglycemia  Resolved Problems:    * No resolved hospital problems.  *    Imaging:   No orders to display     Abnormal labs:   Abnormal Labs Reviewed   CBC WITH AUTO DIFFERENTIAL - Abnormal; Notable for the following components:       Result Value    RBC 5.82 (*)     Hemoglobin 16.4 (*)     Hematocrit 49.4 (*)     All other components within normal limits   BASIC METABOLIC PANEL W/ REFLEX TO MG FOR LOW K - Abnormal; Notable for the following components:    Sodium 131 (*)     Chloride 96 (*)     Glucose 409 (*)     Creatinine 0.5 (*)     All other components within normal limits   HEPATIC FUNCTION PANEL - Abnormal; Notable for the following components:    ALT 45 (*)     All other components within normal limits   URINALYSIS WITH REFLEX TO CULTURE - Abnormal; Notable for the following components:    Glucose, Ur >=1000 (*)     All other components within normal limits     Critical values: no     Abnormal Assessment Findings: None     Background  History:   Past Medical History:   Diagnosis Date    Anxiety     Depression     Graves disease     Graves disease     Graves' disease     Graves' disease     History of blood transfusion

## 2023-07-18 NOTE — H&P
V2.0  History and Physical      Name:  Em Wiggins /Age/Sex: 1970  (48 y.o. female)   MRN & CSN:  [id number] & [id number] Encounter Date/Time: 2023 2:18 PM EDT   Location:  [place] [address] PCP: No primary care provider on file. Hospital Day: 1    Assessment and Plan:   Em Wiggins is a 48 y.o. female with a pmh of Graves disease, Borderline DM who presents with Hyperglycemia    Hospital Problems             Last Modified POA    * (Principal) Hyperglycemia 2023 Yes       Plan:  Nausea,vomiting diarrhea and dehydration in setting of Metformin initiation  No evidence of acidosis   IV fluids   Hold off on metfromin    New onset DM uncontrolled  Bs 409  Will try to control with oral hypoglycemics  Glipizide 5 mg bid and Arnette Vern started  ISS  Diabetic educator consult    H/o Graves   Check TSH with reflex     Morbid obesity with BMI of 43.6   Disposition:   Current Living situation: home   Expected Disposition:  home   Estimated D/C: 1 days     Diet No diet orders on file   DVT Prophylaxis [x] Lovenox, []  Heparin, [] SCDs, [] Ambulation,  [] Eliquis, [] Xarelto, [] Coumadin   Code Status Prior   Surrogate Decision Maker/ POA      Personally reviewed Lab Studies and Imaging     Discussed management of the case with ER  who recommended admission for hyperglycemia and dehydration     EKG interpreted personally and results NSR              History from:     patient    History of Present Illness:     Chief Complaint: N,v, abd pain   Em Wiggins is a 48 y.o. female with pmh of Graves, New onset DM who presents with nausea, vomiting and abdominal pain 2 days after starting metformin. Came to er 2 days ago, was sent from Urgent care where she went for her UTI. Patient was found to be diabetic and started on metformin  Mentions since starting metformin started to have nausea, vomiting, diarrhea and abdominal pain. No fever, no chills.    Does not have PCP  Lives alone  Blood sugars in the ER

## 2023-07-18 NOTE — TELEPHONE ENCOUNTER
Dr Jorge A Walker from [facility] ER called wanting to speak to endo here at our office, pt was started on medformin by ER 2 days ago, now having side effects from medication, wanting recommendations to oral alternatives for pt so she did not have to be admitted. I spoke to Dr Owen Wan and he was uncomfortable making recommendations for a pt that he has never seen, she was seen by C[facility]lle in 2018. I called Dr Jorge A Walker back and let him know.

## 2023-07-18 NOTE — PROGRESS NOTES
Pt admitted to room 6315 by wheelchair from ED. Pt alert and oriented. Up independently with steady gait.

## 2023-07-19 VITALS
WEIGHT: 253.3 LBS | HEIGHT: 64 IN | RESPIRATION RATE: 16 BRPM | HEART RATE: 78 BPM | SYSTOLIC BLOOD PRESSURE: 107 MMHG | OXYGEN SATURATION: 97 % | BODY MASS INDEX: 43.24 KG/M2 | TEMPERATURE: 98.4 F | DIASTOLIC BLOOD PRESSURE: 65 MMHG

## 2023-07-19 LAB
ANION GAP SERPL CALCULATED.3IONS-SCNC: 11 MMOL/L (ref 3–16)
ANION GAP SERPL CALCULATED.3IONS-SCNC: 8 MMOL/L (ref 3–16)
BUN SERPL-MCNC: 11 MG/DL (ref 7–20)
BUN SERPL-MCNC: 8 MG/DL (ref 7–20)
CALCIUM SERPL-MCNC: 8.9 MG/DL (ref 8.3–10.6)
CALCIUM SERPL-MCNC: 9.4 MG/DL (ref 8.3–10.6)
CHLORIDE SERPL-SCNC: 103 MMOL/L (ref 99–110)
CHLORIDE SERPL-SCNC: 103 MMOL/L (ref 99–110)
CO2 SERPL-SCNC: 22 MMOL/L (ref 21–32)
CO2 SERPL-SCNC: 25 MMOL/L (ref 21–32)
CREAT SERPL-MCNC: 0.5 MG/DL (ref 0.6–1.1)
CREAT SERPL-MCNC: 0.6 MG/DL (ref 0.6–1.1)
GFR SERPLBLD CREATININE-BSD FMLA CKD-EPI: >60 ML/MIN/{1.73_M2}
GFR SERPLBLD CREATININE-BSD FMLA CKD-EPI: >60 ML/MIN/{1.73_M2}
GLUCOSE BLD-MCNC: 171 MG/DL (ref 70–99)
GLUCOSE BLD-MCNC: 214 MG/DL (ref 70–99)
GLUCOSE BLD-MCNC: 216 MG/DL (ref 70–99)
GLUCOSE SERPL-MCNC: 170 MG/DL (ref 70–99)
GLUCOSE SERPL-MCNC: 250 MG/DL (ref 70–99)
PERFORMED ON: ABNORMAL
POTASSIUM SERPL-SCNC: 4.1 MMOL/L (ref 3.5–5.1)
POTASSIUM SERPL-SCNC: 5.5 MMOL/L (ref 3.5–5.1)
SODIUM SERPL-SCNC: 136 MMOL/L (ref 136–145)
SODIUM SERPL-SCNC: 136 MMOL/L (ref 136–145)
TSH SERPL DL<=0.005 MIU/L-ACNC: 1.77 UIU/ML (ref 0.27–4.2)

## 2023-07-19 PROCEDURE — 96361 HYDRATE IV INFUSION ADD-ON: CPT

## 2023-07-19 PROCEDURE — 6370000000 HC RX 637 (ALT 250 FOR IP): Performed by: FAMILY MEDICINE

## 2023-07-19 PROCEDURE — 6370000000 HC RX 637 (ALT 250 FOR IP): Performed by: INTERNAL MEDICINE

## 2023-07-19 PROCEDURE — 36415 COLL VENOUS BLD VENIPUNCTURE: CPT

## 2023-07-19 PROCEDURE — 2580000003 HC RX 258: Performed by: INTERNAL MEDICINE

## 2023-07-19 PROCEDURE — 83036 HEMOGLOBIN GLYCOSYLATED A1C: CPT

## 2023-07-19 PROCEDURE — G0378 HOSPITAL OBSERVATION PER HR: HCPCS

## 2023-07-19 PROCEDURE — 80048 BASIC METABOLIC PNL TOTAL CA: CPT

## 2023-07-19 RX ORDER — ALOGLIPTIN 12.5 MG/1
12.5 TABLET, FILM COATED ORAL DAILY
Qty: 30 TABLET | Refills: 1 | Status: SHIPPED | OUTPATIENT
Start: 2023-07-20 | End: 2023-07-21 | Stop reason: SDUPTHER

## 2023-07-19 RX ORDER — DICYCLOMINE HYDROCHLORIDE 10 MG/1
10 CAPSULE ORAL 2 TIMES DAILY PRN
Qty: 120 CAPSULE | Refills: 0 | Status: SHIPPED | OUTPATIENT
Start: 2023-07-19

## 2023-07-19 RX ORDER — GLIPIZIDE 5 MG/1
5 TABLET ORAL
Qty: 60 TABLET | Refills: 3 | Status: SHIPPED | OUTPATIENT
Start: 2023-07-20

## 2023-07-19 RX ORDER — DICYCLOMINE HCL 20 MG
20 TABLET ORAL
Status: DISCONTINUED | OUTPATIENT
Start: 2023-07-19 | End: 2023-07-19 | Stop reason: HOSPADM

## 2023-07-19 RX ADMIN — GLIPIZIDE 5 MG: 5 TABLET ORAL at 06:04

## 2023-07-19 RX ADMIN — SODIUM CHLORIDE: 9 INJECTION, SOLUTION INTRAVENOUS at 03:33

## 2023-07-19 RX ADMIN — DICYCLOMINE HYDROCHLORIDE 20 MG: 20 TABLET ORAL at 09:52

## 2023-07-19 RX ADMIN — INSULIN LISPRO 1 UNITS: 100 INJECTION, SOLUTION INTRAVENOUS; SUBCUTANEOUS at 18:35

## 2023-07-19 RX ADMIN — INSULIN LISPRO 1 UNITS: 100 INJECTION, SOLUTION INTRAVENOUS; SUBCUTANEOUS at 12:26

## 2023-07-19 RX ADMIN — ACETAMINOPHEN 650 MG: 325 TABLET ORAL at 17:15

## 2023-07-19 RX ADMIN — ACETAMINOPHEN 650 MG: 325 TABLET ORAL at 03:30

## 2023-07-19 RX ADMIN — SODIUM CHLORIDE: 9 INJECTION, SOLUTION INTRAVENOUS at 14:27

## 2023-07-19 RX ADMIN — ALOGLIPTIN 12.5 MG: 12.5 TABLET, FILM COATED ORAL at 09:52

## 2023-07-19 RX ADMIN — DICYCLOMINE HYDROCHLORIDE 20 MG: 20 TABLET ORAL at 16:26

## 2023-07-19 RX ADMIN — DICYCLOMINE HYDROCHLORIDE 20 MG: 20 TABLET ORAL at 06:04

## 2023-07-19 RX ADMIN — GLIPIZIDE 5 MG: 5 TABLET ORAL at 16:27

## 2023-07-19 ASSESSMENT — PAIN SCALES - GENERAL
PAINLEVEL_OUTOF10: 4
PAINLEVEL_OUTOF10: 4
PAINLEVEL_OUTOF10: 2
PAINLEVEL_OUTOF10: 3

## 2023-07-19 ASSESSMENT — PAIN DESCRIPTION - ORIENTATION
ORIENTATION: LEFT

## 2023-07-19 ASSESSMENT — PAIN DESCRIPTION - DESCRIPTORS
DESCRIPTORS: CRAMPING;ACHING;SORE
DESCRIPTORS: ACHING;CRAMPING;SORE
DESCRIPTORS: CRAMPING;ACHING;SORE

## 2023-07-19 ASSESSMENT — PAIN DESCRIPTION - PAIN TYPE
TYPE: ACUTE PAIN

## 2023-07-19 ASSESSMENT — PAIN DESCRIPTION - FREQUENCY
FREQUENCY: INTERMITTENT

## 2023-07-19 ASSESSMENT — PAIN DESCRIPTION - LOCATION
LOCATION: ABDOMEN;ELBOW
LOCATION: ABDOMEN;ARM
LOCATION: ABDOMEN;ARM

## 2023-07-19 ASSESSMENT — PAIN DESCRIPTION - ONSET
ONSET: ON-GOING
ONSET: PROGRESSIVE
ONSET: PROGRESSIVE

## 2023-07-19 NOTE — PLAN OF CARE
Problem: Pain  Goal: Verbalizes/displays adequate comfort level or baseline comfort level  Outcome: Adequate for Discharge     Problem: Discharge Planning  Goal: Discharge to home or other facility with appropriate resources  Outcome: Adequate for Discharge     Problem: Chronic Conditions and Co-morbidities  Goal: Patient's chronic conditions and co-morbidity symptoms are monitored and maintained or improved  Outcome: Adequate for Discharge

## 2023-07-19 NOTE — PLAN OF CARE
Problem: Pain  Goal: Verbalizes/displays adequate comfort level or baseline comfort level  Outcome: Progressing  Flowsheets (Taken 7/19/2023 0041)  Verbalizes/displays adequate comfort level or baseline comfort level: Encourage patient to monitor pain and request assistance     Problem: Discharge Planning  Goal: Discharge to home or other facility with appropriate resources  Outcome: Progressing  Flowsheets (Taken 7/19/2023 0041)  Discharge to home or other facility with appropriate resources:   Identify barriers to discharge with patient and caregiver   Identify discharge learning needs (meds, wound care, etc)     Problem: Chronic Conditions and Co-morbidities  Goal: Patient's chronic conditions and co-morbidity symptoms are monitored and maintained or improved  Outcome: Progressing  Flowsheets (Taken 7/19/2023 0041)  Care Plan - Patient's Chronic Conditions and Co-Morbidity Symptoms are Monitored and Maintained or Improved:   Monitor and assess patient's chronic conditions and comorbid symptoms for stability, deterioration, or improvement   Update acute care plan with appropriate goals if chronic or comorbid symptoms are exacerbated and prevent overall improvement and discharge

## 2023-07-19 NOTE — DISCHARGE SUMMARY
V2.0  Discharge Summary    Name:  Diego Lopes /Age/Sex: 1970 (48 y.o. female)   Admit Date: 2023  Discharge Date: 23    MRN & CSN:  [id number] & [id number] Encounter Date and Time 23 5:37 PM EDT    Attending:  Michelle Mcmullen MD Discharging Provider: Gaviota Galvan MD       Hospital Course:     Brief HPI: Diego Lopes is a 48 y.o. female with pmh of Graves, New onset DM who presents with nausea, vomiting and abdominal pain 2 days after starting metformin. Came to er 2 days ago, was sent from Urgent care where she went for her UTI. Patient was found to be diabetic and started on metformin  Mentions since starting metformin started to have nausea, vomiting, diarrhea and abdominal pain. No fever, no chills. Does not have PCP  Lives alone  Blood sugars in the ER 400s. H/o Graves s/p radiation. Took Methamizole and atenolol in the past.      Brief Problem Based Course:   Nausea,vomiting diarrhea and dehydration in setting of Metformin initiation  No evidence of acidosis   IV fluids   Hold off on metfromin, improved with hydration     New onset DM uncontrolled  Bs 409  A1c of 13.1  Will try to control with oral hypoglycemics  Glipizide 5 mg bid and Nesina  12.5 mg started  If blood sugars are not controlled, unfortunately patient have to go on insulin. Advised the patient to follow up [facility] as outpatient. Monitor for hypoglycemia. Patient does have diabetic supplies. H/o Graves   TSH normal     Morbid obesity with BMI of 43.6     The patient expressed appropriate understanding of, and agreement with the discharge recommendations, medications, and plan. Consults this admission:  IP CONSULT TO ENDOCRINOLOGY  IP CONSULT TO DIABETES EDUCATOR    Discharge Diagnosis:   DM  Morbid obesity  H/o graves  Dehydration     Discharge Instruction:   Follow up appointments: PCP  Primary care physician: No primary care provider on file.  within 2 weeks  Diet: no

## 2023-07-19 NOTE — PROGRESS NOTES
Patient is discharging home with family. Discharge instructions, medication instructions and diabetes education was discussed. Patient verbalized understanding. IV was remove with no complications noted.

## 2023-07-19 NOTE — PLAN OF CARE
Seen and examined  D/w pt and RN  Monitor blood sugars and final plan in the evening regarding discharge  Will need f/u with PCP.    Hyperkalemia in morning labs repeat BMP

## 2023-07-20 LAB
EST. AVERAGE GLUCOSE BLD GHB EST-MCNC: 329.3 MG/DL
HBA1C MFR BLD: 13.1 %

## 2023-07-21 ENCOUNTER — OFFICE VISIT (OUTPATIENT)
Dept: INTERNAL MEDICINE CLINIC | Age: 53
End: 2023-07-21
Payer: COMMERCIAL

## 2023-07-21 VITALS
HEIGHT: 64 IN | OXYGEN SATURATION: 97 % | SYSTOLIC BLOOD PRESSURE: 106 MMHG | BODY MASS INDEX: 43.55 KG/M2 | HEART RATE: 84 BPM | TEMPERATURE: 97.3 F | DIASTOLIC BLOOD PRESSURE: 60 MMHG | RESPIRATION RATE: 16 BRPM | WEIGHT: 255.1 LBS

## 2023-07-21 DIAGNOSIS — E78.5 HYPERLIPIDEMIA, UNSPECIFIED HYPERLIPIDEMIA TYPE: ICD-10-CM

## 2023-07-21 DIAGNOSIS — R10.11 RIGHT UPPER QUADRANT ABDOMINAL PAIN: ICD-10-CM

## 2023-07-21 DIAGNOSIS — E55.9 VITAMIN D DEFICIENCY: ICD-10-CM

## 2023-07-21 DIAGNOSIS — E11.9 TYPE 2 DIABETES MELLITUS WITHOUT COMPLICATION, WITHOUT LONG-TERM CURRENT USE OF INSULIN (HCC): Primary | ICD-10-CM

## 2023-07-21 LAB
CHOLEST SERPL-MCNC: 178 MG/DL (ref 0–199)
GLUCOSE BLD-MCNC: 159 MG/DL (ref 70–99)
HDLC SERPL-MCNC: 53 MG/DL (ref 40–60)
LDLC SERPL CALC-MCNC: 107 MG/DL
PERFORMED ON: ABNORMAL
TRIGL SERPL-MCNC: 88 MG/DL (ref 0–150)
VLDLC SERPL CALC-MCNC: 18 MG/DL

## 2023-07-21 PROCEDURE — 99213 OFFICE O/P EST LOW 20 MIN: CPT | Performed by: STUDENT IN AN ORGANIZED HEALTH CARE EDUCATION/TRAINING PROGRAM

## 2023-07-21 RX ORDER — PANTOPRAZOLE SODIUM 40 MG/1
40 TABLET, DELAYED RELEASE ORAL
Qty: 14 TABLET | Refills: 0 | Status: SHIPPED | OUTPATIENT
Start: 2023-07-21 | End: 2023-08-04

## 2023-07-21 RX ORDER — ALOGLIPTIN 12.5 MG/1
12.5 TABLET, FILM COATED ORAL DAILY
Qty: 30 TABLET | Refills: 1 | Status: SHIPPED | OUTPATIENT
Start: 2023-07-21

## 2023-07-21 RX ORDER — BLOOD-GLUCOSE METER
1 KIT MISCELLANEOUS DAILY
Qty: 1 KIT | Refills: 0 | Status: SHIPPED | OUTPATIENT
Start: 2023-07-21

## 2023-07-21 RX ORDER — ONDANSETRON 4 MG/1
4 TABLET, FILM COATED ORAL EVERY 8 HOURS PRN
Qty: 12 TABLET | Refills: 0 | Status: SHIPPED | OUTPATIENT
Start: 2023-07-21

## 2023-07-21 ASSESSMENT — ENCOUNTER SYMPTOMS
SHORTNESS OF BREATH: 0
VOMITING: 0
CONSTIPATION: 0
ABDOMINAL PAIN: 1
COUGH: 0
NAUSEA: 1

## 2023-07-21 NOTE — PATIENT INSTRUCTIONS
Diabetes  -Please continue Alogliptin 12.5 mg daily and Glipizide 5 mg daily  -Please follow the diabetic diet. Please see the attachments for reference.  -Please check your blood sugar two times daily    Abdominal Pain  -Please have a Right upper quadrant ultrasound to rule out gallbladder disease  -Please take Zofran every 8 hours as needed for nausea  -Please take Pantoprazole 40 mg each morning for 14 days. Please take this medication 30 minutes before eating. High Cholesterol  -Please go to the lab and have your cholesterol level checked.

## 2023-07-21 NOTE — PROGRESS NOTES
[facility]    Fletcher Soto is a 48 y.o. female, here for evaluation of the following concerns: Hospital follow up    HPI  The pt is a 48year old female with PMHx of graves disease, and HLD presents to the clinic to establish care following a hospitalization for abdominal pain on 7/18/2023. The pt was recently found to be diabetic. She went to an the emergency department last Friday as she thought she had an UTI, found to have an blood sugar of 363, was started on Metformin 500 mg BID. Following starting this metformin she began to have intense abdominal cramping, and nausea. She was admitted to the hospital from 7/18 until 7/19. Metformin was discontinued and the pt was started on alogliptin, glipizide for her DM, and was given bentyl for her abdominal pain. Her Hgb A1c was 13.1 % on 7/19/2023. The pt has a hx of HLD, is not on a statin as she has not had a PCP recently. A CT abdomen and pelvis on 05/2022 displayed hepatic steatosis. Review of Systems   Constitutional:  Negative for chills and fever. Eyes:  Positive for visual disturbance. Respiratory:  Negative for cough and shortness of breath. Cardiovascular:  Positive for leg swelling. Negative for chest pain. Gastrointestinal:  Positive for abdominal pain and nausea. Negative for constipation and vomiting. Neurological:  Positive for numbness. Negative for light-headedness. MEDICATIONS:  Prior to Visit Medications    Medication Sig Taking?  Authorizing Provider   alogliptin (NESINA) 12.5 MG TABS tablet Take 1 tablet by mouth daily Yes Bartolo Pandya DO   pantoprazole (PROTONIX) 40 MG tablet Take 1 tablet by mouth every morning (before breakfast) for 14 days Yes Bartolo Pandya, DO   ondansetron (ZOFRAN) 4 MG tablet Take 1 tablet by mouth every 8 hours as needed for Nausea or Vomiting Yes Pan Blancas, DO   glucose monitoring (FREESTYLE FREEDOM) kit 1 kit by Does not apply route daily Yes

## 2023-07-25 ENCOUNTER — TELEPHONE (OUTPATIENT)
Dept: INTERNAL MEDICINE CLINIC | Age: 53
End: 2023-07-25

## 2023-07-25 DIAGNOSIS — E11.9 TYPE 2 DIABETES MELLITUS WITHOUT COMPLICATION, WITHOUT LONG-TERM CURRENT USE OF INSULIN (HCC): Primary | ICD-10-CM

## 2023-07-25 RX ORDER — LANCETS 30 GAUGE
1 EACH MISCELLANEOUS 2 TIMES DAILY
Qty: 300 EACH | Refills: 1 | Status: SHIPPED | OUTPATIENT
Start: 2023-07-25

## 2023-07-25 NOTE — TELEPHONE ENCOUNTER
Patient notified alogliptin not covered by insurance changed to Januvia 50 mg one daily . She verbalized understanding     [facility], I was unable to leave message or get past their voice mail . Prescription was E Scribed by Dr. Queta Cerna.

## 2023-07-25 NOTE — TELEPHONE ENCOUNTER
Requested Prescriptions     Pending Prescriptions Disp Refills    blood glucose test strips (ASCENSIA AUTODISC VI;ONE TOUCH ULTRA TEST VI) strip 100 each 3     Si each by In Vitro route 2 times daily As needed.     Lancets MISC 300 each 1     Si each by Does not apply route 2 times daily       Last Clinic Visit:  2023     Next Clinic Appointment:  2023

## 2023-07-25 NOTE — PROGRESS NOTES
Alogliptin prior authorization denied by insurance. Will start on Januvia (sitagliptin) 50mg PO QD which does not require a prior authorization per insurance.      Signed,    Jannet Crisostomo MD, MS, PGY-3  Internal Medicine, [facility]  7/25/23 1:17 PM

## 2023-07-28 ENCOUNTER — TELEPHONE (OUTPATIENT)
Dept: INTERNAL MEDICINE CLINIC | Age: 53
End: 2023-07-28

## 2023-07-28 RX ORDER — INSULIN GLARGINE 100 [IU]/ML
10 INJECTION, SOLUTION SUBCUTANEOUS NIGHTLY
Qty: 3 ML | Refills: 3 | Status: SHIPPED | OUTPATIENT
Start: 2023-07-28 | End: 2023-08-04 | Stop reason: ALTCHOICE

## 2023-07-28 NOTE — TELEPHONE ENCOUNTER
URGENT PT STATED JANUVIA IS CAUSING MUSCLE SPASMS, AND PT WANTS TO KNOW IF SHE CAN DISCONTINUE THAT MEDICATION HAVE A NEW RX FOR RYBELSUS?  PT CAN BE REACHED [phone] PT NEED TO KNOW BEFORE CLINIC CLOSES FOR THE WEEKEND

## 2023-07-28 NOTE — TELEPHONE ENCOUNTER
Pt called and stated that Januvia was causing spasms of her abdomen making her contort. Asked if she can stop taking the medication but was worried about her blood glucose level. Took her blood glucose level while on the phone, which read 302. She notes that she ate a meal 1 hour prior which included a pancake with syrup. Instructed her to recheck her glucose level at 5 PM with no carbohydrates before that time. Advised her to stop taking Januvia and glipizide and start nightly Lantus injections 10 units. Strongly recommend she have the pharmacist teach her how to use the injectable pen.     Dhiraj Molina,   Internal Medicine, PGY-1  07/28/23

## 2023-08-02 ENCOUNTER — TELEPHONE (OUTPATIENT)
Dept: INTERNAL MEDICINE CLINIC | Age: 53
End: 2023-08-02

## 2023-08-02 NOTE — TELEPHONE ENCOUNTER
PT STATED SHE THINKS SHE HAS UTI AND SHE WANTS TO KNOW IF SHE CAN GO BACK ON CEPHALEXIN  500MG THAT SHE GOT FROM THE ER WHEN SHE HAD UTI BEFORE? PT WANTS TO KNOW IF IT'S OK  TO TAKE WITH HER INSULIN? PT CAN BE REACHED AT  [phone]

## 2023-08-04 ENCOUNTER — OFFICE VISIT (OUTPATIENT)
Dept: INTERNAL MEDICINE CLINIC | Age: 53
End: 2023-08-04
Payer: COMMERCIAL

## 2023-08-04 VITALS
DIASTOLIC BLOOD PRESSURE: 85 MMHG | OXYGEN SATURATION: 97 % | SYSTOLIC BLOOD PRESSURE: 139 MMHG | RESPIRATION RATE: 16 BRPM | HEART RATE: 84 BPM | BODY MASS INDEX: 43.79 KG/M2 | WEIGHT: 255.1 LBS | TEMPERATURE: 98.1 F

## 2023-08-04 DIAGNOSIS — Z12.31 ENCOUNTER FOR SCREENING MAMMOGRAM FOR MALIGNANT NEOPLASM OF BREAST: ICD-10-CM

## 2023-08-04 DIAGNOSIS — E11.9 TYPE 2 DIABETES MELLITUS WITHOUT COMPLICATION, WITHOUT LONG-TERM CURRENT USE OF INSULIN (HCC): Primary | ICD-10-CM

## 2023-08-04 DIAGNOSIS — E78.2 MIXED HYPERLIPIDEMIA: ICD-10-CM

## 2023-08-04 PROBLEM — Z00.00 ROUTINE ADULT HEALTH MAINTENANCE: Status: ACTIVE | Noted: 2023-08-04

## 2023-08-04 PROCEDURE — 99213 OFFICE O/P EST LOW 20 MIN: CPT

## 2023-08-04 RX ORDER — INSULIN GLARGINE AND LIXISENATIDE 100; 33 U/ML; UG/ML
10 INJECTION, SOLUTION SUBCUTANEOUS NIGHTLY
Qty: 30 ADJUSTABLE DOSE PRE-FILLED PEN SYRINGE | Refills: 3 | Status: SHIPPED | OUTPATIENT
Start: 2023-08-04 | End: 2023-08-04

## 2023-08-04 RX ORDER — ATORVASTATIN CALCIUM 40 MG/1
40 TABLET, FILM COATED ORAL DAILY
Qty: 30 TABLET | Refills: 3 | Status: SHIPPED | OUTPATIENT
Start: 2023-08-04

## 2023-08-04 RX ORDER — INSULIN GLARGINE AND LIXISENATIDE 100; 33 U/ML; UG/ML
10 INJECTION, SOLUTION SUBCUTANEOUS NIGHTLY
Qty: 30 ADJUSTABLE DOSE PRE-FILLED PEN SYRINGE | Refills: 3 | Status: SHIPPED | OUTPATIENT
Start: 2023-08-04

## 2023-08-04 RX ORDER — ATORVASTATIN CALCIUM 40 MG/1
40 TABLET, FILM COATED ORAL DAILY
Qty: 30 TABLET | Refills: 3 | Status: SHIPPED | OUTPATIENT
Start: 2023-08-04 | End: 2023-08-04

## 2023-08-04 ASSESSMENT — ENCOUNTER SYMPTOMS
NAUSEA: 0
DIARRHEA: 0
VOMITING: 0
ABDOMINAL PAIN: 0

## 2023-08-04 NOTE — ASSESSMENT & PLAN NOTE
- Started on Lipitor 40 given DM and recent LDL's elev  - Pt slightly hesitant given side effects, however amenable  - pt informed to watch out for muscle aches and pains, dark urine as these are most bothersome ADRs typically

## 2023-08-04 NOTE — PROGRESS NOTES
[facility]    [facility] is a 48 y.o. female, here for evaluation of the following concerns:    Pt is a 49 yo female with new dx of T2DM after recent ED visit for dysuria showed glucosuria and A1c measured at 13.1. Pt presents today for medication clarification/check. Pt started on Metformin in ED, but caused significant abdominal pain and N/V. After which in the clinic establish care on 7/21 started on Januvia, would also require a second abdominal discomfort. Thus was changed to Lantus 10 but was only taking 5 units after possible miscommunication. She recorded BG values ranging from 200-300s on the Lantus 5 untis nightly. Since then, abdominal complaints have resolved completely    She reports blurry vision which started a few weeks ago, along with possible lateral toes numbness. However, sugars have been elevated enough to cause A1c 13%. Denies CP, SOB, abd pain, N/V/D. Last C scope 2018, repeat 2028. Last mammo 2020 per chart, not yet repeated. Review of Systems   Constitutional:  Negative for chills and fever. Cardiovascular:  Negative for chest pain and palpitations. Gastrointestinal:  Negative for abdominal pain, diarrhea, nausea and vomiting. Genitourinary:  Negative for dysuria. Musculoskeletal:  Negative for myalgias. Neurological:  Negative for weakness and headaches. MEDICATIONS:  Prior to Visit Medications    Medication Sig Taking? Authorizing Provider   atorvastatin (LIPITOR) 40 MG tablet Take 1 tablet by mouth daily Yes Kelly Francisco MD   Insulin Glargine-Lixisenatide (SOLIQUA) 100-33 UNT-MCG/ML SOPN Inject 10 Units into the skin nightly Please INCREASE dose by one unit if your morning pre-meal glucose continues to be above 140. Please do not EXCEED 40 units. Yes Kelly Francisco MD   blood glucose test strips (ASCENSIA AUTODISC VI;ONE TOUCH ULTRA TEST VI) strip 1 each by In Vitro route 2 times daily As needed.  Yes Britni

## 2023-08-04 NOTE — ASSESSMENT & PLAN NOTE
- switch to Soliqua (lantus + GLP-1 combo)  - start at 10, instructed to increase by 1u eachtime AM BG reading above 140, not to exceed 40 units  - pt keeps a beautifully organized BG log.

## 2023-08-04 NOTE — PATIENT INSTRUCTIONS
Great to meet your Malonne. Please now take a new insulin called Soliqua 10 units daily (instead of your previous insulin). Please increase by 1 unit for each time your morning glucose is ABOVE 140. Please do NOT increase beyond 40 units however if it comes to this.   Please try taking Lipitor (for cholesterol)  Please

## 2023-08-07 ENCOUNTER — TELEPHONE (OUTPATIENT)
Dept: INTERNAL MEDICINE CLINIC | Age: 53
End: 2023-08-07

## 2023-08-07 DIAGNOSIS — E78.2 MIXED HYPERLIPIDEMIA: ICD-10-CM

## 2023-08-07 RX ORDER — ATORVASTATIN CALCIUM 40 MG/1
40 TABLET, FILM COATED ORAL DAILY
Qty: 90 TABLET | Refills: 1 | Status: SHIPPED | OUTPATIENT
Start: 2023-08-07

## 2023-08-07 NOTE — TELEPHONE ENCOUNTER
90-DAY SUPPLY REQUEST      Requested Prescriptions     Pending Prescriptions Disp Refills    atorvastatin (LIPITOR) 40 MG tablet 90 tablet 1     Sig: Take 1 tablet by mouth daily       Last Clinic Visit:  8/4/2023     Next Clinic Appointment:  8/25/2023

## 2023-08-07 NOTE — TELEPHONE ENCOUNTER
Patient was called and notified that her med. Request for atorvastatin was filled and went to the pharmacy.

## 2023-08-15 ENCOUNTER — TELEPHONE (OUTPATIENT)
Dept: INTERNAL MEDICINE CLINIC | Age: 53
End: 2023-08-15

## 2023-08-15 DIAGNOSIS — E78.2 MIXED HYPERLIPIDEMIA: ICD-10-CM

## 2023-08-15 RX ORDER — CALCIUM CITRATE/VITAMIN D3 200MG-6.25
1 TABLET ORAL DAILY
Qty: 100 EACH | Refills: 3 | Status: SHIPPED | OUTPATIENT
Start: 2023-08-15 | End: 2023-09-06 | Stop reason: SDUPTHER

## 2023-08-15 RX ORDER — LANCING DEVICE
1 EACH MISCELLANEOUS DAILY
Qty: 100 EACH | Refills: 2 | Status: SHIPPED | OUTPATIENT
Start: 2023-08-15

## 2023-08-15 NOTE — TELEPHONE ENCOUNTER
Spoke to patient to let her know that the refill for her lancets and strips were sent in to the pharmacy. She then stated she wanted to stay on the Lantus and not try the [place]wn [place] at this time because she feels like the Lantus is working for her. The Lantus was discontinued d/t she was prescribed the [place]. Patient want the Lantus reordered.

## 2023-08-15 NOTE — TELEPHONE ENCOUNTER
PT STATED SHE'S HAVING TROUBLE GETTING CURRENT GLUCOMETER TO WORK, SO SHE BROUGHT ONE FROM [facility]. PT STATED SHE NOW NEED  TRUE METRIX TEST STRIPS AND LANCETS FOR THE Lali Yemi. PLEASE CALL PT BACK [phone] WHEN ORDERED.  OK TO LEAVE A MESSAGE PER PT.

## 2023-08-17 ENCOUNTER — TELEPHONE (OUTPATIENT)
Dept: INTERNAL MEDICINE CLINIC | Age: 53
End: 2023-08-17

## 2023-08-17 NOTE — TELEPHONE ENCOUNTER
PT STATED SHE WANTS TO GO BACK ON SOLOSTAR INSULIN INSTEAD OF TRYING SOMETHING NEW ([address]). PT STATED THE SOLOSTAR INSULIN WORKS. PT WANTS A CALL BACK TODAY LETTING HER KNOW  SOLOSTAR CAN BE RESTARTED OR NOT.  PT CAN BE REACHED [phone]

## 2023-08-18 ENCOUNTER — TELEPHONE (OUTPATIENT)
Dept: INTERNAL MEDICINE CLINIC | Age: 53
End: 2023-08-18

## 2023-08-18 RX ORDER — INSULIN GLARGINE 100 [IU]/ML
10 INJECTION, SOLUTION SUBCUTANEOUS NIGHTLY
Qty: 3 ML | Refills: 3 | Status: SHIPPED | OUTPATIENT
Start: 2023-08-18 | End: 2023-12-16

## 2023-08-18 NOTE — TELEPHONE ENCOUNTER
Spoke to the patient again and notified her that we sent another order to [address] [place] [address] again for the [employer]. She stated she understood.

## 2023-08-18 NOTE — TELEPHONE ENCOUNTER
This patient has called back again needing her Lantus  solostar reordered. She states she does not wan the  [place] which was discontinued yesterday. But still need order for lantus. Needs to go to [facility] in [place].

## 2023-08-18 NOTE — TELEPHONE ENCOUNTER
States she spoke to someone 5 minutes ago and Lantus prescription was sent to her pharmacy . States she doesn't need anything else,.

## 2023-09-03 PROBLEM — Z00.00 ROUTINE ADULT HEALTH MAINTENANCE: Status: RESOLVED | Noted: 2023-08-04 | Resolved: 2023-09-03

## 2023-09-05 ENCOUNTER — TELEPHONE (OUTPATIENT)
Dept: INTERNAL MEDICINE CLINIC | Age: 53
End: 2023-09-05

## 2023-09-05 NOTE — TELEPHONE ENCOUNTER
PT NEED REFILLS ON INSULIN PEN NEEDLES. PLEASE SEND TO [facility] LISTED ON PROFILE. PT ALSO WANTS A CALL BACK WHEN RX HAS BEEN SENT.  PT CAN BE REACHED [phone]

## 2023-09-06 RX ORDER — LANCETS 30 GAUGE
1 EACH MISCELLANEOUS 2 TIMES DAILY
Qty: 300 EACH | Refills: 1 | Status: SHIPPED | OUTPATIENT
Start: 2023-09-06

## 2023-09-06 RX ORDER — INSULIN GLARGINE 100 [IU]/ML
10 INJECTION, SOLUTION SUBCUTANEOUS NIGHTLY
Qty: 3 ML | Refills: 3 | Status: SHIPPED | OUTPATIENT
Start: 2023-09-06 | End: 2023-09-29 | Stop reason: ALTCHOICE

## 2023-09-06 RX ORDER — CALCIUM CITRATE/VITAMIN D3 200MG-6.25
1 TABLET ORAL DAILY
Qty: 100 EACH | Refills: 3 | Status: SHIPPED | OUTPATIENT
Start: 2023-09-06

## 2023-09-08 ENCOUNTER — TELEPHONE (OUTPATIENT)
Dept: INTERNAL MEDICINE CLINIC | Age: 53
End: 2023-09-08

## 2023-09-08 DIAGNOSIS — E11.9 TYPE 2 DIABETES MELLITUS WITHOUT COMPLICATION, WITHOUT LONG-TERM CURRENT USE OF INSULIN (HCC): Primary | ICD-10-CM

## 2023-09-08 RX ORDER — PEN NEEDLE, DIABETIC 31 G X1/4"
1 NEEDLE, DISPOSABLE MISCELLANEOUS DAILY
Qty: 100 EACH | Refills: 3 | Status: SHIPPED | OUTPATIENT
Start: 2023-09-08

## 2023-09-08 NOTE — TELEPHONE ENCOUNTER
PT STATED SHE IS NOT TAKING NEW MEDICATION LIPITOR OR [address]. PT STATED SHE NEED REFILL ON  INSULIN PEN NEEDLES. PT ALSO  WANTS TO KNOW IF SHE NEED TO COME IN 3 MONTHS OF KEEP 9-29 DARLEEN. PT WAS UNABLE TO COME IN ON 9-8, SO THAT DARLEEN WAS CANCELED.  PT CAN BE REACHED [phone]

## 2023-09-29 ENCOUNTER — OFFICE VISIT (OUTPATIENT)
Dept: INTERNAL MEDICINE CLINIC | Age: 53
End: 2023-09-29
Payer: COMMERCIAL

## 2023-09-29 VITALS
OXYGEN SATURATION: 97 % | TEMPERATURE: 97.5 F | RESPIRATION RATE: 16 BRPM | BODY MASS INDEX: 44.47 KG/M2 | HEART RATE: 88 BPM | SYSTOLIC BLOOD PRESSURE: 122 MMHG | DIASTOLIC BLOOD PRESSURE: 65 MMHG | WEIGHT: 259.1 LBS

## 2023-09-29 DIAGNOSIS — M25.571 ACUTE RIGHT ANKLE PAIN: ICD-10-CM

## 2023-09-29 DIAGNOSIS — Z12.31 SCREENING MAMMOGRAM, ENCOUNTER FOR: ICD-10-CM

## 2023-09-29 DIAGNOSIS — E78.2 MIXED HYPERLIPIDEMIA: ICD-10-CM

## 2023-09-29 DIAGNOSIS — E11.9 TYPE 2 DIABETES MELLITUS WITHOUT COMPLICATION, WITHOUT LONG-TERM CURRENT USE OF INSULIN (HCC): ICD-10-CM

## 2023-09-29 PROCEDURE — 99213 OFFICE O/P EST LOW 20 MIN: CPT | Performed by: STUDENT IN AN ORGANIZED HEALTH CARE EDUCATION/TRAINING PROGRAM

## 2023-09-29 RX ORDER — ROSUVASTATIN CALCIUM 20 MG/1
20 TABLET, COATED ORAL NIGHTLY
Qty: 30 TABLET | Refills: 3 | Status: SHIPPED | OUTPATIENT
Start: 2023-09-29

## 2023-09-29 RX ORDER — ACYCLOVIR 400 MG/1
1 TABLET ORAL DAILY
Qty: 1 EACH | Refills: 0 | Status: SHIPPED | OUTPATIENT
Start: 2023-09-29

## 2023-09-29 ASSESSMENT — ENCOUNTER SYMPTOMS
VOMITING: 0
ABDOMINAL PAIN: 1
SHORTNESS OF BREATH: 0
RHINORRHEA: 0
DIARRHEA: 0
COUGH: 0
NAUSEA: 0

## 2023-09-29 NOTE — PATIENT INSTRUCTIONS
Diabetes  -Stop taking Lantus  -Start taking SOLIQUA at 10 units a day. You may increase the dose by 1 unit each day your blood sugar is under 140. Do not take more than 40 units of SOLIQUA. Do not take [address] with other forms of long acting insulin such as Lantus.  -Please continue to check your blood sugar two times a day. Record it in a log, and bring it with you to your next appointment.  -Please follow the diabetic diet, please see attachment for more information. High Cholesterol  -Stop taking Atorvastatin  -Start taking Rosuvastatin 20 mg daily    Right Ankle Pain  -You can use Ibuprofen as needed for pain. Use as directed on the bottle. Please take with food. -You can use a warm compress as needed for your symptoms.  -You can use an ACE wrap as needed for support.  -Please wear athletic shoes for increased support. -If your pain does not resolve in two weeks you can call and schedule an appointment for further evaluation. Health Care Maintenance  -Please call and schedule your annual mammogram screening.  [phone]

## 2023-09-29 NOTE — PROGRESS NOTES
[facility]    Fletcher Soto is a 48 y.o. female, here for evaluation of the following concerns: f/u DM    HPI    The pt is a 48year old female with a PMHx of DM, and graves disease s/p ablation who presents to clinic for a follow up appointment for her DM. The pt was recently dx with DM T2, had an Hgb A1c of 13.1 in 07/2023, Hgb A1c is 10.1 % today. The pt was initially started on Januvia, then transitioned to Lantus, and last visit her DM medication was changed to [place]. The pt never started taking Soliqua, has been using Lantus 15u nightly. The pt takes her blood sugar twice daily, The pt's morning values average around 140 and above, and the nightly values are still above 140, with a number of values greater than 170. The pt denies any hypoglycemic events. The pt has HLD, her last lipid panel on 07/2023 displayed a total cholesterol of 107, LDL of 107, and HDL of 53. She has had hepatic steatosis on CT abdomen pelvis from 05/2022. The pt has not started Lipitor out of concern for the side effects arthralgias and myalgias. The pt endorses a sharp pain in her right ankle, states that the pain began last evening, denies recent trauma, denies hx of any prior trauma to that ankle. Review of Systems   Constitutional:  Negative for fever. HENT:  Negative for congestion and rhinorrhea. Eyes:  Negative for visual disturbance. Respiratory:  Negative for cough and shortness of breath. Cardiovascular:  Negative for chest pain. Gastrointestinal:  Positive for abdominal pain. Negative for diarrhea, nausea and vomiting. Endocrine: Negative for polydipsia and polyuria. Genitourinary:  Negative for dysuria. Musculoskeletal:  Positive for arthralgias. Neurological:  Positive for headaches. Negative for weakness and numbness. MEDICATIONS:  Prior to Visit Medications    Medication Sig Taking?  Authorizing Provider   rosuvastatin (CRESTOR) 20 MG

## 2023-10-02 RX ORDER — ACYCLOVIR 400 MG/1
1 TABLET ORAL
Qty: 2 EACH | Refills: 5 | Status: SHIPPED | OUTPATIENT
Start: 2023-10-02

## 2023-11-03 ENCOUNTER — OFFICE VISIT (OUTPATIENT)
Dept: INTERNAL MEDICINE CLINIC | Age: 53
End: 2023-11-03
Payer: COMMERCIAL

## 2023-11-03 VITALS
HEART RATE: 82 BPM | BODY MASS INDEX: 45.07 KG/M2 | SYSTOLIC BLOOD PRESSURE: 133 MMHG | OXYGEN SATURATION: 97 % | TEMPERATURE: 97 F | HEIGHT: 64 IN | DIASTOLIC BLOOD PRESSURE: 85 MMHG | RESPIRATION RATE: 20 BRPM | WEIGHT: 264 LBS

## 2023-11-03 DIAGNOSIS — N30.01 ACUTE CYSTITIS WITH HEMATURIA: ICD-10-CM

## 2023-11-03 DIAGNOSIS — E78.2 MIXED HYPERLIPIDEMIA: ICD-10-CM

## 2023-11-03 DIAGNOSIS — E11.9 TYPE 2 DIABETES MELLITUS WITHOUT COMPLICATION, WITHOUT LONG-TERM CURRENT USE OF INSULIN (HCC): ICD-10-CM

## 2023-11-03 DIAGNOSIS — M54.50 ACUTE BILATERAL LOW BACK PAIN WITHOUT SCIATICA: ICD-10-CM

## 2023-11-03 LAB
BILIRUBIN URINE: NEGATIVE MG/DL
BLOOD, URINE: ABNORMAL
CLARITY: ABNORMAL
COLOR: ABNORMAL
GLUCOSE URINE: NEGATIVE MG/DL
HBA1C MFR BLD: 8.8 %
KETONES, URINE: NEGATIVE MG/DL
LEUKOCYTE ESTERASE, URINE: ABNORMAL
MICROSCOPIC EXAMINATION: YES
NITRITE, URINE: NEGATIVE
PH UA: 6 (ref 5–8)
PROTEIN UA: NEGATIVE MG/DL
SPECIFIC GRAVITY UA: 1.02 (ref 1–1.03)
UROBILINOGEN, URINE: 0.2 E.U./DL

## 2023-11-03 PROCEDURE — 81003 URINALYSIS AUTO W/O SCOPE: CPT

## 2023-11-03 PROCEDURE — 83036 HEMOGLOBIN GLYCOSYLATED A1C: CPT

## 2023-11-03 PROCEDURE — 99213 OFFICE O/P EST LOW 20 MIN: CPT | Performed by: STUDENT IN AN ORGANIZED HEALTH CARE EDUCATION/TRAINING PROGRAM

## 2023-11-03 RX ORDER — INSULIN GLARGINE 100 [IU]/ML
20 INJECTION, SOLUTION SUBCUTANEOUS
COMMUNITY
Start: 2023-10-27

## 2023-11-03 RX ORDER — CIPROFLOXACIN 500 MG/1
500 TABLET, FILM COATED ORAL 2 TIMES DAILY
Qty: 10 TABLET | Refills: 0 | Status: SHIPPED | OUTPATIENT
Start: 2023-11-03 | End: 2023-11-08

## 2023-11-03 ASSESSMENT — ENCOUNTER SYMPTOMS
COUGH: 0
NAUSEA: 1
DIARRHEA: 0
BACK PAIN: 1
SHORTNESS OF BREATH: 0
BLOOD IN STOOL: 0
ABDOMINAL PAIN: 1
VOMITING: 0

## 2023-11-03 NOTE — PROGRESS NOTES
The [facility] Outpatient Internal Medicine Clinic    Dora Trinh is a 48 y.o. female, here for evaluation of the following concerns: lower back pain, DM f/u    HPI    The pt endorses L sided lower back pain of 2 days duration. She says that it is worse with walking. She also endorses mild pain with urination, and endorses that it is similar to UTIs symptoms that she has had in the past.    The pt has DM, her Hgb A1c was 8.8 % in office today, it was previously 10.1 % on 9/29, and 13.1 % in 07/2023. The pt was previously prescribed  Soliqua, she picked up the medication but never started it, and continued to take Lantus. The pt and writer discussed the medication Beola Pinch at her last visit 5 weeks ago, went over the added benefit of being on a GLP-1 receptor agonist, and agreed to start taking the medication were to follow up at this appointment. The pt reports that she did not start Beola Pinch, is currently taking Lantus Solostar 15u qHS. The pt has HLD, her last lipid panel on 07/2023 displayed a total cholesterol of 107, LDL of 107, and HDL of 53. She has had hepatic steatosis on CT abdomen pelvis from 05/2022. She was prescribed Crestor 20 mg daily at her last appointment 5 weeks ago. The pt states that she has not started the medication since she it was prescribed. Review of Systems   Constitutional:  Negative for fever and unexpected weight change. Respiratory:  Negative for cough and shortness of breath. Cardiovascular:  Negative for chest pain. Gastrointestinal:  Positive for abdominal pain and nausea. Negative for blood in stool, diarrhea and vomiting. Genitourinary:  Positive for dysuria. Musculoskeletal:  Positive for back pain and myalgias. MEDICATIONS:  Prior to Visit Medications    Medication Sig Taking?  Authorizing Provider   ciprofloxacin (CIPRO) 500 MG tablet Take 1 tablet by mouth 2 times daily for 5 days Yes Jeremie Pandya, DO   rosuvastatin (CRESTOR) 20 MG tablet

## 2023-11-03 NOTE — PATIENT INSTRUCTIONS
Urinary tract infection  -Please take Ciprofloxacin 500 mg twice a day for 5 days. A paper script has been provided for you. Diabetes   -Please increase Lantus Solostar to 20 units each night  -Please do not take [address]   -Please continue to check your blood each evening and morning. Write it down and bring it with you to your next appointment.  -Please follow the diabetic diet. A handout has been provided for you. High Cholesterol  -Please start Crestor 20 mg at bedtime.

## 2023-11-06 RX ORDER — ROSUVASTATIN CALCIUM 20 MG/1
20 TABLET, COATED ORAL
Qty: 90 TABLET | Refills: 1 | Status: SHIPPED | OUTPATIENT
Start: 2023-11-06

## 2023-11-06 NOTE — TELEPHONE ENCOUNTER
Requested Prescriptions     Pending Prescriptions Disp Refills    rosuvastatin (CRESTOR) 20 MG tablet [Pharmacy Med Name: ROSUVASTATIN 20MG TABLETS] 90 tablet 1     Sig: TAKE 1 TABLET BY MOUTH EVERY NIGHT       Last Clinic Visit:  11/3/2023     Next Clinic Appointment:  12/8/2023

## 2023-11-21 RX ORDER — ACYCLOVIR 400 MG/1
1 TABLET ORAL
Qty: 2 EACH | Refills: 5 | Status: CANCELLED | OUTPATIENT
Start: 2023-11-21

## 2023-11-21 RX ORDER — ACYCLOVIR 400 MG/1
1 TABLET ORAL DAILY
Qty: 1 EACH | Refills: 0 | Status: CANCELLED | OUTPATIENT
Start: 2023-11-21

## 2023-11-21 RX ORDER — BLOOD-GLUCOSE METER
1 KIT MISCELLANEOUS DAILY
Qty: 1 KIT | Refills: 0 | Status: CANCELLED | OUTPATIENT
Start: 2023-11-21

## 2023-12-01 ENCOUNTER — TELEPHONE (OUTPATIENT)
Dept: INTERNAL MEDICINE CLINIC | Age: 53
End: 2023-12-01

## 2023-12-05 ENCOUNTER — TELEPHONE (OUTPATIENT)
Dept: INTERNAL MEDICINE CLINIC | Age: 53
End: 2023-12-05

## 2023-12-05 NOTE — TELEPHONE ENCOUNTER
JENNIFER CLINICAL  WITH [facility] STATED PT NEED P.A FOR DEXCOM G7  RX. JENNIFER STATED PT HAS BEEN WAITING FOR P.A. TO BE DONE FOR A WHILE AND NO ONE HAS TAKEN CARE OF IT YET. JENNIFER WOULD LIKE A CALL BACK TO FIND OUT WHAT'S GOING ON OR TO HELP IN ANYWAY. [phone]

## 2023-12-06 ENCOUNTER — TELEPHONE (OUTPATIENT)
Dept: INTERNAL MEDICINE CLINIC | Age: 53
End: 2023-12-06

## 2023-12-06 NOTE — TELEPHONE ENCOUNTER
Dr. Stefanie Reddy did peer to peer for Medicare Part B. They said to bill through [facility] and pharmacist at [facility] and Dexcom is not covered by this plan either. I called patient and she spoke to insurance. Dexcom needs to be ordered as \"DME' and sent to a medical supply store such as [employer]. Patient prefers it to be delivered if possible.

## 2023-12-07 RX ORDER — ACYCLOVIR 400 MG/1
1 TABLET ORAL DAILY
Qty: 1 EACH | Refills: 0 | Status: SHIPPED | OUTPATIENT
Start: 2023-12-07

## 2023-12-07 RX ORDER — ACYCLOVIR 400 MG/1
1 TABLET ORAL
Qty: 3 EACH | Refills: 5 | Status: SHIPPED | OUTPATIENT
Start: 2023-12-07

## 2023-12-22 RX ORDER — BLOOD SUGAR DIAGNOSTIC
STRIP MISCELLANEOUS
Qty: 100 STRIP | Refills: 1 | Status: SHIPPED | OUTPATIENT
Start: 2023-12-22

## 2023-12-22 NOTE — TELEPHONE ENCOUNTER
Requested Prescriptions     Pending Prescriptions Disp Refills    ONETOUCH ULTRA strip [Pharmacy Med Name: ONE TOUCH ULTRA BLUE TESTST(NEW)100] 100 strip      Sig: TEST AS DIRECTED TWICE DAILY AS NEEDED       Last Clinic Visit:  11/3/2023     Next Clinic Appointment:  1/12/2024

## 2024-01-12 RX ORDER — INSULIN GLARGINE 100 [IU]/ML
20 INJECTION, SOLUTION SUBCUTANEOUS
Qty: 5 ADJUSTABLE DOSE PRE-FILLED PEN SYRINGE | Refills: 0 | Status: SHIPPED | OUTPATIENT
Start: 2024-01-12 | End: 2024-02-09 | Stop reason: SDUPTHER

## 2024-01-12 NOTE — TELEPHONE ENCOUNTER
PT TO CALL BACK TO [facility], BUT PT STATED SHE NEED REFILL ON LANTUS SOLOSTAR PEN FOR A 90 DAY SUPPY. PT STATED SHE ONLY HAS ABOUT 6 MORE DAYS OF INSULIN LEFT. PT ALSO STATED HER INS WILL NOT COVER DEXCOM G7 BUT [facility] WILL COVER FREESTYLE 3 KIT. [facility] [phone]   no

## 2024-01-22 RX ORDER — INSULIN GLARGINE 100 [IU]/ML
INJECTION, SOLUTION SUBCUTANEOUS
Qty: 3 ML | OUTPATIENT
Start: 2024-01-22

## 2024-02-09 ENCOUNTER — OFFICE VISIT (OUTPATIENT)
Dept: INTERNAL MEDICINE CLINIC | Age: 54
End: 2024-02-09
Payer: COMMERCIAL

## 2024-02-09 VITALS
OXYGEN SATURATION: 98 % | SYSTOLIC BLOOD PRESSURE: 119 MMHG | WEIGHT: 268.4 LBS | TEMPERATURE: 97 F | RESPIRATION RATE: 20 BRPM | DIASTOLIC BLOOD PRESSURE: 90 MMHG | BODY MASS INDEX: 44.72 KG/M2 | HEART RATE: 89 BPM | HEIGHT: 65 IN

## 2024-02-09 DIAGNOSIS — M62.838 MUSCLE SPASM: ICD-10-CM

## 2024-02-09 DIAGNOSIS — Z79.4 TYPE 2 DIABETES MELLITUS WITHOUT COMPLICATION, WITH LONG-TERM CURRENT USE OF INSULIN (HCC): Primary | ICD-10-CM

## 2024-02-09 DIAGNOSIS — E11.9 TYPE 2 DIABETES MELLITUS WITHOUT COMPLICATION, WITH LONG-TERM CURRENT USE OF INSULIN (HCC): Primary | ICD-10-CM

## 2024-02-09 DIAGNOSIS — E11.9 TYPE 2 DIABETES MELLITUS WITHOUT COMPLICATION, WITHOUT LONG-TERM CURRENT USE OF INSULIN (HCC): ICD-10-CM

## 2024-02-09 LAB — HBA1C MFR BLD: 8.4 %

## 2024-02-09 PROCEDURE — 99213 OFFICE O/P EST LOW 20 MIN: CPT

## 2024-02-09 RX ORDER — PEN NEEDLE, DIABETIC 31 G X1/4"
1 NEEDLE, DISPOSABLE MISCELLANEOUS DAILY
Qty: 100 EACH | Refills: 3 | Status: SHIPPED | OUTPATIENT
Start: 2024-02-09

## 2024-02-09 RX ORDER — CALCIUM CITRATE/VITAMIN D3 200MG-6.25
1 TABLET ORAL DAILY
Qty: 100 EACH | Refills: 3 | Status: SHIPPED | OUTPATIENT
Start: 2024-02-09

## 2024-02-09 RX ORDER — BLOOD-GLUCOSE SENSOR
EACH MISCELLANEOUS
Qty: 6 EACH | Refills: 1 | Status: SHIPPED | OUTPATIENT
Start: 2024-02-09

## 2024-02-09 RX ORDER — PEN NEEDLE, DIABETIC 31 G X1/4"
1 NEEDLE, DISPOSABLE MISCELLANEOUS DAILY
Qty: 100 EACH | Refills: 3 | Status: SHIPPED | OUTPATIENT
Start: 2024-02-09 | End: 2024-02-09 | Stop reason: SDUPTHER

## 2024-02-09 RX ORDER — ROSUVASTATIN CALCIUM 20 MG/1
20 TABLET, COATED ORAL DAILY
Qty: 90 TABLET | Refills: 1 | Status: SHIPPED | OUTPATIENT
Start: 2024-02-09

## 2024-02-09 RX ORDER — CYCLOBENZAPRINE HCL 5 MG
5 TABLET ORAL 2 TIMES DAILY PRN
Qty: 10 TABLET | Refills: 1 | Status: SHIPPED | OUTPATIENT
Start: 2024-02-09 | End: 2024-02-19

## 2024-02-09 RX ORDER — BLOOD-GLUCOSE SENSOR
EACH MISCELLANEOUS
Qty: 2 EACH | Refills: 2 | Status: SHIPPED | OUTPATIENT
Start: 2024-02-09

## 2024-02-09 RX ORDER — INSULIN GLARGINE 100 [IU]/ML
20 INJECTION, SOLUTION SUBCUTANEOUS
Qty: 18 ML | Refills: 0 | Status: SHIPPED | OUTPATIENT
Start: 2024-02-09 | End: 2024-05-09

## 2024-02-09 ASSESSMENT — ENCOUNTER SYMPTOMS
ABDOMINAL PAIN: 0
VOMITING: 0
COUGH: 0
DIARRHEA: 0
NAUSEA: 0
CHEST TIGHTNESS: 0
SHORTNESS OF BREATH: 0
BACK PAIN: 0
WHEEZING: 0
CONSTIPATION: 0

## 2024-02-09 NOTE — PATIENT INSTRUCTIONS
Thank you for visiting the [facility] outpatient clinic    Use Flexeril 5 mg as needed for muscle spasms.  Take this medication at night since it can cause dizziness during the daytime.  And do not drive  or operate any machinery that requires attention when you feel dizzy.    Will fill for Crestor and the glucose sensor has been sent to your pharmacy    Have labs done before the next visit in 3 months.

## 2024-02-09 NOTE — TELEPHONE ENCOUNTER
Requested Prescriptions     Pending Prescriptions Disp Refills    Insulin Pen Needle ([employer] PEN NEEDLES) 31G X 6 MM MISC 100 each 3     Si each by Does not apply route daily       Last Clinic Visit:  2024     Next Clinic Appointment:  2024  
Enhanced Supervision

## 2024-02-09 NOTE — PROGRESS NOTES
Outpatient Clinic Established Patient Note    Patient: Elizabeth Wade  : 1970 (53 y.o.)  Date: 2024    CC: Follow-up visit    HPI:    53-year-old female past medical history type 2 diabetes come to the office today for follow-up visit.  She has been in good health since the last clinic visit in 2023, and has been compliant with medications as prescribed.  She has been checking her blood glucose at home and numbers has been in the 120s and 130s.  She denied any polyuria polydipsia or any weight changes.  Patient had no acute complaints this visit but did endorse some muscle spasms for the past couple of weeks.  Patient endorsed some spasm in the back muscles after prolonged standing and wants something to help with it.  She had no other complaints and physical examination was unremarkable.    Patient denies any fever, chills, nausea, vomiting, chest pain, palpitations, abdominal pain, diarrhea or constipation, dysuria, urinary urgency or frequency, lower extremity swelling or pain.     Home Meds:  Prior to Visit Medications    Medication Sig Taking? Authorizing Provider   cyclobenzaprine (FLEXERIL) 5 MG tablet Take 1 tablet by mouth 2 times daily as needed for Muscle spasms Yes Prince Lundy MD   Continuous Blood Gluc Sensor (FREESTYLE KM 3 SENSOR) MISC Obtain readings upon awakening, before going to bed, before each meal and  minutes after each meal, before and after activities/exercise. Yes Prince Lundy MD   rosuvastatin (CRESTOR) 20 MG tablet Take 1 tablet by mouth daily Yes Prince Lundy MD   LANTUS SOLOSTAR 100 UNIT/ML injection pen Inject 20 Units into the skin nightly Yes Prince Lundy MD   blood glucose test strips (TRUE METRIX BLOOD GLUCOSE TEST) strip 1 each by In Vitro route daily As needed. Yes Prince Lundy MD   Continuous Blood Gluc Sensor (FREESTYLE KM 3 SENSOR) MISC Obtain readings upon awakening, before going to bed, before each

## 2024-03-22 ENCOUNTER — TELEPHONE (OUTPATIENT)
Dept: INTERNAL MEDICINE CLINIC | Age: 54
End: 2024-03-22

## 2024-03-22 NOTE — TELEPHONE ENCOUNTER
PT LVM STATING SHE NEED RX FOR HANDICAP PLACARD, AND A LETTER EXCUSING HER FROM JURY DUTY.   PT CAN BE REACHED [phone]

## 2024-04-05 ENCOUNTER — OFFICE VISIT (OUTPATIENT)
Dept: INTERNAL MEDICINE CLINIC | Age: 54
End: 2024-04-05
Payer: COMMERCIAL

## 2024-04-05 VITALS
OXYGEN SATURATION: 100 % | SYSTOLIC BLOOD PRESSURE: 121 MMHG | TEMPERATURE: 97.3 F | RESPIRATION RATE: 16 BRPM | DIASTOLIC BLOOD PRESSURE: 78 MMHG | HEART RATE: 87 BPM | HEIGHT: 65 IN | BODY MASS INDEX: 45 KG/M2 | WEIGHT: 270.1 LBS

## 2024-04-05 DIAGNOSIS — R35.0 MICTURITION FREQUENCY: Primary | ICD-10-CM

## 2024-04-05 DIAGNOSIS — N34.2 URETHRITIS: ICD-10-CM

## 2024-04-05 DIAGNOSIS — Z78.0 MENOPAUSE: ICD-10-CM

## 2024-04-05 LAB
BILIRUBIN URINE: NEGATIVE MG/DL
BLOOD, URINE: NEGATIVE
CLARITY: ABNORMAL
COLOR: ABNORMAL
GLUCOSE URINE: NEGATIVE MG/DL
KETONES, URINE: NEGATIVE MG/DL
LEUKOCYTE ESTERASE, URINE: ABNORMAL
MICROSCOPIC EXAMINATION: YES
NITRITE, URINE: NEGATIVE
PH UA: 6 (ref 5–8)
PROTEIN UA: NEGATIVE MG/DL
SPECIFIC GRAVITY UA: 1.02 (ref 1–1.03)
UROBILINOGEN, URINE: 0.2 E.U./DL

## 2024-04-05 PROCEDURE — 99213 OFFICE O/P EST LOW 20 MIN: CPT | Performed by: STUDENT IN AN ORGANIZED HEALTH CARE EDUCATION/TRAINING PROGRAM

## 2024-04-05 PROCEDURE — 81003 URINALYSIS AUTO W/O SCOPE: CPT

## 2024-04-05 RX ORDER — PHENAZOPYRIDINE HYDROCHLORIDE 100 MG/1
100 TABLET, FILM COATED ORAL 3 TIMES DAILY PRN
Qty: 30 TABLET | Refills: 0 | Status: SHIPPED | OUTPATIENT
Start: 2024-04-05 | End: 2025-04-05

## 2024-04-05 NOTE — PROGRESS NOTES
Micturition frequency/ Urethritis   -     Pyridium 100 mg TID PRN   -     Urinalysis with Microscopic-- revealing only trace Leukocyte esterase  2. Menopause  -     AFL - NICOLE Lane MD, Gynecology, [facility]    3. Letter for Jury Duty      Return in about 6 months (around 10/5/2024).    The patient was staffed with teaching attending: Dr. Alex Manzo.    An electronic signature was used to authenticate this note.    --Mary Beth Ross MD

## 2024-04-05 NOTE — PATIENT INSTRUCTIONS
You were seen at the [facility] outpatient clinic on 4/5/24 for routine follow up visit.  -- We gave you the Letter to get out of Jury Duty   -- Ordered pyridium for you which will help with the discomfort at the end of urination but it will turn your urine oranges which is normal.      0 = independent

## 2024-05-24 ENCOUNTER — TELEPHONE (OUTPATIENT)
Dept: INTERNAL MEDICINE CLINIC | Age: 54
End: 2024-05-24

## 2024-05-24 RX ORDER — INSULIN GLARGINE 100 [IU]/ML
20 INJECTION, SOLUTION SUBCUTANEOUS
Qty: 18 ML | Refills: 0 | Status: SHIPPED | OUTPATIENT
Start: 2024-05-24 | End: 2024-08-22

## 2024-05-24 NOTE — TELEPHONE ENCOUNTER
PT STATED SHE NEED REFILL ON LANTUS INSULIN. PT ALSO STATED SHE'S NO LONGER TAKING ROSUVASTATIN DUE TO MUSCLE SPASMS, AND WANTS TO TRY TAKING REPATHA INSTEAD. PT CAN BE REACHED [phone]

## 2024-05-29 NOTE — TELEPHONE ENCOUNTER
Patient states she would rather wait until her scheduled appointment on June 28th. She said she really wants to see if she can take the repatha.

## 2024-06-28 ENCOUNTER — OFFICE VISIT (OUTPATIENT)
Dept: INTERNAL MEDICINE CLINIC | Age: 54
End: 2024-06-28
Payer: COMMERCIAL

## 2024-06-28 VITALS
OXYGEN SATURATION: 95 % | TEMPERATURE: 98.1 F | SYSTOLIC BLOOD PRESSURE: 123 MMHG | HEIGHT: 65 IN | BODY MASS INDEX: 44.6 KG/M2 | DIASTOLIC BLOOD PRESSURE: 83 MMHG | WEIGHT: 267.7 LBS | HEART RATE: 87 BPM | RESPIRATION RATE: 16 BRPM

## 2024-06-28 DIAGNOSIS — Z12.31 ENCOUNTER FOR SCREENING MAMMOGRAM FOR MALIGNANT NEOPLASM OF BREAST: ICD-10-CM

## 2024-06-28 DIAGNOSIS — E78.2 MIXED HYPERLIPIDEMIA: ICD-10-CM

## 2024-06-28 DIAGNOSIS — L91.8 SKIN TAG: ICD-10-CM

## 2024-06-28 DIAGNOSIS — E11.9 TYPE 2 DIABETES MELLITUS WITHOUT COMPLICATION, WITHOUT LONG-TERM CURRENT USE OF INSULIN (HCC): Primary | ICD-10-CM

## 2024-06-28 DIAGNOSIS — R82.90 MALODOROUS URINE: ICD-10-CM

## 2024-06-28 LAB
BILIRUBIN, URINE: NEGATIVE MG/DL
BLOOD, URINE: NEGATIVE
CLARITY, UA: NORMAL
COLOR, UA: NORMAL
GLUCOSE URINE: NEGATIVE MG/DL
HBA1C MFR BLD: 8.1 %
KETONES, URINE: NEGATIVE MG/DL
LEUKOCYTE ESTERASE, URINE: NEGATIVE
MICROSCOPIC EXAMINATION: NORMAL
NITRITE, URINE: NEGATIVE
PH, URINE: 5.5 (ref 5–8)
PROTEIN UA: NEGATIVE MG/DL
SPECIFIC GRAVITY UA: 1.02 (ref 1–1.03)
UROBILINOGEN, URINE: 1 E.U./DL

## 2024-06-28 PROCEDURE — 99213 OFFICE O/P EST LOW 20 MIN: CPT

## 2024-06-28 PROCEDURE — 83036 HEMOGLOBIN GLYCOSYLATED A1C: CPT

## 2024-06-28 PROCEDURE — 81003 URINALYSIS AUTO W/O SCOPE: CPT

## 2024-06-28 RX ORDER — PYRIDOXINE HCL (VITAMIN B6) 100 MG
500 TABLET ORAL 2 TIMES DAILY
Qty: 60 CAPSULE | Refills: 3 | Status: SHIPPED | OUTPATIENT
Start: 2024-06-28

## 2024-06-28 RX ORDER — DULAGLUTIDE 0.75 MG/.5ML
0.75 INJECTION, SOLUTION SUBCUTANEOUS WEEKLY
Qty: 2 ADJUSTABLE DOSE PRE-FILLED PEN SYRINGE | Refills: 3 | Status: SHIPPED | OUTPATIENT
Start: 2024-06-28

## 2024-06-28 NOTE — PATIENT INSTRUCTIONS
We are starting you on a weekly injectable to help with your diabetes.  It is called Trulicity (insurance preferred that over Victoza).  Please call if you have any side effects like GI upset.  Start taking cranberry tablets supplements for your urinary issues  I have ordered a screening mammogram please get this completed at your earliest convenience.

## 2024-06-28 NOTE — PROGRESS NOTES
The [facility] Outpatient Internal Medicine Clinic    Elizabeth Wade is a 54 y.o. female, here for evaluation of the following concerns:    HPI    Review of Systems    MEDICATIONS:  Prior to Visit Medications    Medication Sig Taking? Authorizing Provider   LANTUS SOLOSTAR 100 UNIT/ML injection pen Inject 20 Units into the skin nightly Yes Rafael Branham DO   blood glucose test strips (TRUE METRIX BLOOD GLUCOSE TEST) strip 1 each by In Vitro route daily As needed. Yes Prince Lundy MD   Insulin Pen Needle ([employer] PEN NEEDLES) 31G X 6 MM MISC 1 each by Does not apply route daily Yes Prince Lundy MD   Handicap Placard MISC by Does not apply route Effective from 2-9-2024 through 2-8-2025 Yes Prince Lundy MD   ONETOUCH ULTRA strip TEST AS DIRECTED TWICE DAILY AS NEEDED Yes Joe Owens MD   Lancets MISC 1 each by Does not apply route 2 times daily Yes Ximena Ward MD   [facility] Lancets MISC 1 each by Does not apply route daily Yes Ivan Clemens MD   glucose monitoring (FREESTYLE FREEDOM) kit 1 kit by Does not apply route daily Yes Jeremie Pandya DO   phenazopyridine (PYRIDIUM) 100 MG tablet Take 1 tablet by mouth 3 times daily as needed for Pain  Patient not taking: Reported on 6/28/2024  Mary Beth Ross MD   Continuous Blood Gluc Sensor (FREESTYLE KM 3 SENSOR) MISC Obtain readings upon awakening, before going to bed, before each meal and  minutes after each meal, before and after activities/exercise.  Patient not taking: Reported on 4/5/2024  Prince Lundy MD   ondansetron (ZOFRAN) 4 MG tablet Take 1 tablet by mouth every 8 hours as needed for Nausea or Vomiting  Patient not taking: Reported on 6/28/2024  Jeremie Pandya DO   dicyclomine (BENTYL) 10 MG capsule Take 1 capsule by mouth 2 times daily as needed (antispasmodics)  Patient not taking: Reported on 6/28/2024  Arina Conteh MD        Vitals:    06/28/24 1055   BP: 123/83   Site: Right Upper  DIRECTED TWICE DAILY AS NEEDED Yes Joe Owens MD   Lancets MISC 1 each by Does not apply route 2 times daily Yes Ximena Ward MD   [facility] Lancets MISC 1 each by Does not apply route daily Yes Ivan Clemens MD   glucose monitoring (FREESTYLE FREEDOM) kit 1 kit by Does not apply route daily Yes Jeremie Pandya DO   phenazopyridine (PYRIDIUM) 100 MG tablet Take 1 tablet by mouth 3 times daily as needed for Pain  Patient not taking: Reported on 6/28/2024  Mary Beth Ross MD   Continuous Blood Gluc Sensor (FREESTYLE KM 3 SENSOR) [facility] Obtain readings upon awakening, before going to bed, before each meal and  minutes after each meal, before and after activities/exercise.  Patient not taking: Reported on 4/5/2024  Prince Lundy MD   ondansetron (ZOFRAN) 4 MG tablet Take 1 tablet by mouth every 8 hours as needed for Nausea or Vomiting  Patient not taking: Reported on 6/28/2024  Jeremie Pandya DO   dicyclomine (BENTYL) 10 MG capsule Take 1 capsule by mouth 2 times daily as needed (antispasmodics)  Patient not taking: Reported on 6/28/2024  Arina Conteh MD        Vitals:    06/28/24 1055   BP: 123/83   Site: Right Upper Arm   Position: Sitting   Cuff Size: Large Adult   Pulse: 87   Resp: 16   Temp: 98.1 °F (36.7 °C)   TempSrc: Temporal   SpO2: 95%   Weight: 121.4 kg (267 lb 11.2 oz)   Height: 1.638 m (5' 4.5\")      Estimated body mass index is 45.24 kg/m² as calculated from the following:    Height as of this encounter: 1.638 m (5' 4.5\").    Weight as of this encounter: 121.4 kg (267 lb 11.2 oz).  Physical Exam  Constitutional:       General: She is not in acute distress.     Appearance: She is not toxic-appearing.   HENT:      Mouth/Throat:      Mouth: Mucous membranes are moist.   Eyes:      Extraocular Movements: Extraocular movements intact.      Pupils: Pupils are equal, round, and reactive to light.   Cardiovascular:      Rate and Rhythm: Normal rate and regular rhythm.

## 2024-07-01 PROBLEM — R82.90 MALODOROUS URINE: Status: ACTIVE | Noted: 2024-07-01

## 2024-07-01 ASSESSMENT — ENCOUNTER SYMPTOMS
GASTROINTESTINAL NEGATIVE: 1
RESPIRATORY NEGATIVE: 1

## 2024-07-02 NOTE — ASSESSMENT & PLAN NOTE
Benign, nonconcerning, no bothersome symptoms  - Patient does not like the cosmetic appearance  - Will try cryotherapy to freeze off lesion when available in clinic

## 2024-07-02 NOTE — ASSESSMENT & PLAN NOTE
Borderline controlled, stop medications  No history of CAD, heart disease  - Has not tolerated atorvastatin or rosuvastatin  - Patient asking about Repatha, this is not indicated

## 2024-07-02 NOTE — ASSESSMENT & PLAN NOTE
Uncontrolled, continue current medications  A1c 8.1 today from 8.4 from 8.8, improving, but not at goal  - Continue 20 units Lantus  - Continue diet  - Start Trulicity  - Continue to monitor

## 2024-07-17 ENCOUNTER — APPOINTMENT (OUTPATIENT)
Dept: CT IMAGING | Age: 54
End: 2024-07-17
Payer: COMMERCIAL

## 2024-07-17 ENCOUNTER — HOSPITAL ENCOUNTER (EMERGENCY)
Age: 54
Discharge: HOME | End: 2024-07-17
Attending: EMERGENCY MEDICINE
Payer: COMMERCIAL

## 2024-07-17 ENCOUNTER — APPOINTMENT (OUTPATIENT)
Dept: GENERAL RADIOLOGY | Age: 54
End: 2024-07-17
Payer: COMMERCIAL

## 2024-07-17 VITALS
BODY MASS INDEX: 45.77 KG/M2 | DIASTOLIC BLOOD PRESSURE: 62 MMHG | TEMPERATURE: 99.4 F | HEIGHT: 64 IN | RESPIRATION RATE: 14 BRPM | WEIGHT: 268.08 LBS | SYSTOLIC BLOOD PRESSURE: 119 MMHG | OXYGEN SATURATION: 96 % | HEART RATE: 84 BPM

## 2024-07-17 DIAGNOSIS — J06.9 VIRAL UPPER RESPIRATORY TRACT INFECTION WITH COUGH: Primary | ICD-10-CM

## 2024-07-17 LAB
ANION GAP SERPL CALCULATED.3IONS-SCNC: 12 MMOL/L (ref 3–16)
BASE EXCESS BLDV CALC-SCNC: 3.6 MMOL/L (ref -3–3)
BASOPHILS # BLD: 0.1 K/UL (ref 0–0.2)
BASOPHILS NFR BLD: 1.9 %
BUN SERPL-MCNC: 7 MG/DL (ref 7–20)
CALCIUM SERPL-MCNC: 9.7 MG/DL (ref 8.3–10.6)
CHLORIDE SERPL-SCNC: 101 MMOL/L (ref 99–110)
CO2 BLDV-SCNC: 30 MMOL/L
CO2 SERPL-SCNC: 25 MMOL/L (ref 21–32)
CREAT SERPL-MCNC: 0.7 MG/DL (ref 0.6–1.1)
DEPRECATED RDW RBC AUTO: 14.5 % (ref 12.4–15.4)
EKG ATRIAL RATE: 98 BPM
EKG DIAGNOSIS: NORMAL
EKG P AXIS: 48 DEGREES
EKG P-R INTERVAL: 140 MS
EKG Q-T INTERVAL: 352 MS
EKG QRS DURATION: 74 MS
EKG QTC CALCULATION (BAZETT): 449 MS
EKG R AXIS: -5 DEGREES
EKG T AXIS: 29 DEGREES
EKG VENTRICULAR RATE: 98 BPM
EOSINOPHIL # BLD: 0.2 K/UL (ref 0–0.6)
EOSINOPHIL NFR BLD: 3 %
FLUAV RNA UPPER RESP QL NAA+PROBE: NEGATIVE
FLUBV AG NPH QL: NEGATIVE
GFR SERPLBLD CREATININE-BSD FMLA CKD-EPI: >90 ML/MIN/{1.73_M2}
GLUCOSE SERPL-MCNC: 109 MG/DL (ref 70–99)
HCO3 BLDV-SCNC: 29.1 MMOL/L (ref 23–29)
HCT VFR BLD AUTO: 49.4 % (ref 36–48)
HGB BLD-MCNC: 16.5 G/DL (ref 12–16)
LACTATE BLDV-SCNC: 1.1 MMOL/L (ref 0.4–2)
LYMPHOCYTES # BLD: 1.2 K/UL (ref 1–5.1)
LYMPHOCYTES NFR BLD: 22.6 %
MCH RBC QN AUTO: 28.3 PG (ref 26–34)
MCHC RBC AUTO-ENTMCNC: 33.4 G/DL (ref 31–36)
MCV RBC AUTO: 84.9 FL (ref 80–100)
MONOCYTES # BLD: 0.4 K/UL (ref 0–1.3)
MONOCYTES NFR BLD: 6.7 %
NEUTROPHILS # BLD: 3.5 K/UL (ref 1.7–7.7)
NEUTROPHILS NFR BLD: 65.8 %
O2 THERAPY: ABNORMAL
PCO2 BLDV: 52.6 MMHG (ref 40–50)
PH BLDV: 7.35 [PH] (ref 7.35–7.45)
PLATELET # BLD AUTO: 218 K/UL (ref 135–450)
PMV BLD AUTO: 9.4 FL (ref 5–10.5)
PO2 BLDV: 23.1 MMHG (ref 25–40)
POTASSIUM SERPL-SCNC: 4.1 MMOL/L (ref 3.5–5.1)
RBC # BLD AUTO: 5.82 M/UL (ref 4–5.2)
SAO2 % BLDV: 36 %
SARS-COV-2 RDRP RESP QL NAA+PROBE: NOT DETECTED
SODIUM SERPL-SCNC: 138 MMOL/L (ref 136–145)
TROPONIN, HIGH SENSITIVITY: 8 NG/L (ref 0–14)
WBC # BLD AUTO: 5.3 K/UL (ref 4–11)

## 2024-07-17 PROCEDURE — 99285 EMERGENCY DEPT VISIT HI MDM: CPT

## 2024-07-17 PROCEDURE — 84484 ASSAY OF TROPONIN QUANT: CPT

## 2024-07-17 PROCEDURE — 83605 ASSAY OF LACTIC ACID: CPT

## 2024-07-17 PROCEDURE — 85025 COMPLETE CBC W/AUTO DIFF WBC: CPT

## 2024-07-17 PROCEDURE — 93010 ELECTROCARDIOGRAM REPORT: CPT | Performed by: INTERNAL MEDICINE

## 2024-07-17 PROCEDURE — 80048 BASIC METABOLIC PNL TOTAL CA: CPT

## 2024-07-17 PROCEDURE — 6360000002 HC RX W HCPCS: Performed by: EMERGENCY MEDICINE

## 2024-07-17 PROCEDURE — 87804 INFLUENZA ASSAY W/OPTIC: CPT

## 2024-07-17 PROCEDURE — 96360 HYDRATION IV INFUSION INIT: CPT

## 2024-07-17 PROCEDURE — 96374 THER/PROPH/DIAG INJ IV PUSH: CPT

## 2024-07-17 PROCEDURE — 96361 HYDRATE IV INFUSION ADD-ON: CPT

## 2024-07-17 PROCEDURE — 2580000003 HC RX 258: Performed by: EMERGENCY MEDICINE

## 2024-07-17 PROCEDURE — 82803 BLOOD GASES ANY COMBINATION: CPT

## 2024-07-17 PROCEDURE — 6360000004 HC RX CONTRAST MEDICATION: Performed by: EMERGENCY MEDICINE

## 2024-07-17 PROCEDURE — 71260 CT THORAX DX C+: CPT

## 2024-07-17 PROCEDURE — 87635 SARS-COV-2 COVID-19 AMP PRB: CPT

## 2024-07-17 PROCEDURE — 71046 X-RAY EXAM CHEST 2 VIEWS: CPT

## 2024-07-17 PROCEDURE — 93005 ELECTROCARDIOGRAM TRACING: CPT | Performed by: EMERGENCY MEDICINE

## 2024-07-17 RX ORDER — NAPROXEN 500 MG/1
500 TABLET ORAL 2 TIMES DAILY
Qty: 20 TABLET | Refills: 0 | Status: SHIPPED | OUTPATIENT
Start: 2024-07-17 | End: 2024-07-27

## 2024-07-17 RX ORDER — 0.9 % SODIUM CHLORIDE 0.9 %
500 INTRAVENOUS SOLUTION INTRAVENOUS ONCE
Status: COMPLETED | OUTPATIENT
Start: 2024-07-17 | End: 2024-07-17

## 2024-07-17 RX ORDER — 0.9 % SODIUM CHLORIDE 0.9 %
1000 INTRAVENOUS SOLUTION INTRAVENOUS ONCE
Status: DISCONTINUED | OUTPATIENT
Start: 2024-07-17 | End: 2024-07-17

## 2024-07-17 RX ORDER — KETOROLAC TROMETHAMINE 15 MG/ML
15 INJECTION, SOLUTION INTRAMUSCULAR; INTRAVENOUS ONCE
Status: COMPLETED | OUTPATIENT
Start: 2024-07-17 | End: 2024-07-17

## 2024-07-17 RX ORDER — ALBUTEROL SULFATE 90 UG/1
2 AEROSOL, METERED RESPIRATORY (INHALATION) EVERY 6 HOURS PRN
Qty: 18 G | Refills: 3 | Status: SHIPPED | OUTPATIENT
Start: 2024-07-17

## 2024-07-17 RX ADMIN — SODIUM CHLORIDE 500 ML: 9 INJECTION, SOLUTION INTRAVENOUS at 16:26

## 2024-07-17 RX ADMIN — IOPAMIDOL 75 ML: 755 INJECTION, SOLUTION INTRAVENOUS at 16:58

## 2024-07-17 RX ADMIN — KETOROLAC TROMETHAMINE 15 MG: 15 INJECTION INTRAMUSCULAR at 17:47

## 2024-07-17 ASSESSMENT — LIFESTYLE VARIABLES
HOW MANY STANDARD DRINKS CONTAINING ALCOHOL DO YOU HAVE ON A TYPICAL DAY: PATIENT DOES NOT DRINK
HOW OFTEN DO YOU HAVE A DRINK CONTAINING ALCOHOL: NEVER

## 2024-07-17 ASSESSMENT — PAIN DESCRIPTION - LOCATION: LOCATION: CHEST;THROAT

## 2024-07-17 ASSESSMENT — PAIN DESCRIPTION - FREQUENCY: FREQUENCY: INTERMITTENT

## 2024-07-17 ASSESSMENT — PAIN - FUNCTIONAL ASSESSMENT: PAIN_FUNCTIONAL_ASSESSMENT: 0-10

## 2024-07-17 ASSESSMENT — HEART SCORE: ECG: NORMAL

## 2024-07-17 ASSESSMENT — PAIN DESCRIPTION - PAIN TYPE: TYPE: ACUTE PAIN

## 2024-07-17 ASSESSMENT — PAIN SCALES - GENERAL: PAINLEVEL_OUTOF10: 10

## 2024-07-17 NOTE — ED PROVIDER NOTES
EMERGENCY DEPARTMENT ENCOUNTER     [facility] EMERGENCY DEPARTMENT     Pt Name: Elizabeth Wade   MRN: [id number]   Birthdate 1970   Date of evaluation: 2024   Provider: Connor Keith MD   PCP: Kandace Erickson MD   Note Started: 5:55 PM EDT 24     CHIEF COMPLAINT     Chief Complaint   Patient presents with    Cough    Chest Pain    Head Injury    Dizziness        HISTORY OF PRESENT ILLNESS:          Elizabeth Wade is a 54 y.o. female who presents with cough chest pain seems to be pleuritic and low-grade fever    This is a pleasant 54-year-old -American female with migratory past medical history who is not vaccinated for COVID comes in with 3 to 8 days of URI symptoms cough intermittent chest pain with coughing and fevers and chills.  Denies any history of ill contacts that she lives by herself.    Nursing Notes were all reviewed and agreed with or any disagreements were addressed in the HPI.     ROS: Positives and Pertinent negatives as per HPI.    PAST MEDICAL HISTORY     Past medical history:  has a past medical history of Anxiety, Depression, Diabetes mellitus (HCC), Graves disease, Graves disease, Graves' disease, Graves' disease, History of blood transfusion, Hyperlipidemia, Rapid heart beat, and Thyroid disease.    Past surgical history:  has a past surgical history that includes  section; pr egd transoral biopsy single/multiple (N/A, 10/22/2018); Colonoscopy (10/22/2018); Colonoscopy (10/22/2018); Total abdominal hysterectomy w/ bilateral salpingoophorectomy (Bilateral, 2019); Hysterectomy; and CYSTOSCOPY INSERTION / REMOVAL STENT / STONE (Left, 2019).      PHYSICAL EXAM:  ED Triage Vitals [24 1529]   BP Temp Temp Source Pulse Respirations SpO2 Height Weight - Scale   119/62 99.4 °F (37.4 °C) Oral (!) 104 14 98 % 1.626 m (5' 4\") 121.6 kg (268 lb 1.3 oz)        Physical Exam  Constitutional:       General: She is not in acute distress.     Appearance:

## 2024-07-17 NOTE — ED TRIAGE NOTES
Pt reports feeling sick for 3-4 days with cough and fatigue.  C/o headache and sore throat.  Pt is alert and oriented.  Pt reports increased pain with cough.  Reports small amount of sputum with cough.

## 2024-07-17 NOTE — DISCHARGE INSTRUCTIONS
1.  Medications as directed.  #2 follow-up with your primary care physician call for an appointment.  3.  Return emergency department for worsening signs of infection.

## 2024-08-16 DIAGNOSIS — E11.9 TYPE 2 DIABETES MELLITUS WITHOUT COMPLICATION, WITHOUT LONG-TERM CURRENT USE OF INSULIN (HCC): Primary | ICD-10-CM

## 2024-08-16 RX ORDER — INSULIN GLARGINE 100 [IU]/ML
20 INJECTION, SOLUTION SUBCUTANEOUS
Qty: 18 ML | Refills: 2 | Status: SHIPPED | OUTPATIENT
Start: 2024-08-16

## 2024-10-18 ENCOUNTER — OFFICE VISIT (OUTPATIENT)
Dept: INTERNAL MEDICINE CLINIC | Age: 54
End: 2024-10-18
Payer: COMMERCIAL

## 2024-10-18 VITALS
OXYGEN SATURATION: 98 % | DIASTOLIC BLOOD PRESSURE: 82 MMHG | BODY MASS INDEX: 46.05 KG/M2 | TEMPERATURE: 97 F | HEART RATE: 85 BPM | WEIGHT: 268.3 LBS | RESPIRATION RATE: 18 BRPM | SYSTOLIC BLOOD PRESSURE: 137 MMHG

## 2024-10-18 DIAGNOSIS — E11.9 TYPE 2 DIABETES MELLITUS WITHOUT COMPLICATION, WITH LONG-TERM CURRENT USE OF INSULIN (HCC): Primary | ICD-10-CM

## 2024-10-18 DIAGNOSIS — Z79.4 TYPE 2 DIABETES MELLITUS WITHOUT COMPLICATION, WITH LONG-TERM CURRENT USE OF INSULIN (HCC): Primary | ICD-10-CM

## 2024-10-18 DIAGNOSIS — E78.2 MIXED HYPERLIPIDEMIA: ICD-10-CM

## 2024-10-18 DIAGNOSIS — R71.8 ELEVATED RED BLOOD CELL COUNT: ICD-10-CM

## 2024-10-18 DIAGNOSIS — E11.9 TYPE 2 DIABETES MELLITUS WITHOUT COMPLICATION, WITHOUT LONG-TERM CURRENT USE OF INSULIN (HCC): ICD-10-CM

## 2024-10-18 DIAGNOSIS — R82.90 MALODOROUS URINE: ICD-10-CM

## 2024-10-18 LAB — HBA1C MFR BLD: 8.8 %

## 2024-10-18 PROCEDURE — 83036 HEMOGLOBIN GLYCOSYLATED A1C: CPT

## 2024-10-18 PROCEDURE — 99213 OFFICE O/P EST LOW 20 MIN: CPT

## 2024-10-18 RX ORDER — PHENAZOPYRIDINE HYDROCHLORIDE 100 MG/1
100 TABLET, FILM COATED ORAL 3 TIMES DAILY PRN
Qty: 30 TABLET | Refills: 0 | Status: SHIPPED | OUTPATIENT
Start: 2024-10-18 | End: 2025-10-18

## 2024-10-18 RX ORDER — PEN NEEDLE, DIABETIC 31 G X1/4"
1 NEEDLE, DISPOSABLE MISCELLANEOUS DAILY
Qty: 100 EACH | Refills: 3 | Status: SHIPPED | OUTPATIENT
Start: 2024-10-18

## 2024-10-18 RX ORDER — CALCIUM CITRATE/VITAMIN D3 200MG-6.25
1 TABLET ORAL DAILY
Qty: 100 EACH | Refills: 3 | Status: SHIPPED | OUTPATIENT
Start: 2024-10-18

## 2024-10-18 RX ORDER — INSULIN GLARGINE 100 [IU]/ML
20 INJECTION, SOLUTION SUBCUTANEOUS
Qty: 18 ML | Refills: 2 | Status: SHIPPED | OUTPATIENT
Start: 2024-10-18

## 2024-10-18 NOTE — PROGRESS NOTES
Chief Complaint   Patient presents with    Medication Refill     Still some discomfort wanst pyridium. She never wentto the Gyn. Doctor     Diabetes     See A1C she has not taken the Trulicity . She goggled it and is now afraid to take it          HPI:  Elizabeth Wade is a 54 y.o. (: 1970) here today   for diabetes follow up.   Diabetes  She presents for her follow-up diabetic visit. She has type 2 diabetes mellitus. There are no hypoglycemic associated symptoms. Associated symptoms include foot paresthesias. Pertinent negatives for diabetes include no chest pain, no fatigue and no foot ulcerations. Symptoms are stable. Risk factors for coronary artery disease include tobacco exposure. Current diabetic treatment includes insulin injections. She is currently taking insulin at bedtime. Her home blood glucose trend is increasing steadily. She does not see a podiatrist.Eye exam is not current.   She also has not been taking the Trulicity due to concerns with side effects. She also endorses occasional dizziness which do occur while seated. Position does not effect or precipitate the dizziness. During these episodes she has tested her blood sugar and it was high at these times, higher than her normal.     She also complains of foul smelling urine. The color is light yellow with no particulate or foaming. It is consistent throughout the day. She also endorses a very mild pain end stream when urinating. Denies fevers, abdominal pain, urethral tenderness with wiping. She has been off her normal diet recently but the smell started prior to this diet change.     She has not got her mammogram done or made an appointment with gyn.    Patient's medications, allergies, past medical, surgical, social and family histories were reviewed and updated as appropriate.    ROS:  Review of Systems   Constitutional:  Negative for fatigue.   Respiratory:  Negative for chest tightness and shortness of breath.    Cardiovascular:

## 2024-10-18 NOTE — PATIENT INSTRUCTIONS
Start taking the Trulicity (Dulaglutide). If you have side effects call the clinic and make an appointment if you need to.  Call and make an appointment for your mammogram.  Call Dr. Nichols's (Gynecologist) office and make an appointment.  Please get your labs drawn sometime in the next week. Please be fasting for the labs.   If you get dizzy, check what your blood sugar is and write it down and bring to the next visit.   Keep taking it one step at a time and celebrate the little wins.

## 2025-03-20 ENCOUNTER — TELEPHONE (OUTPATIENT)
Dept: INTERNAL MEDICINE CLINIC | Age: 55
End: 2025-03-20

## 2025-03-20 NOTE — TELEPHONE ENCOUNTER
Patient would like to speak with a doctor regarding her Lantus.     Patient can be reached at [phone]

## 2025-03-20 NOTE — TELEPHONE ENCOUNTER
Called patient back. Discussed with patient that she recently tried to refill her Lantus and was told that it is no longer on formulary. She wishes to continue taking Lantus as she has been stable on it. She was diagnosed with type 2 diabetes July 2023 and started on Lantus. She has been taking 20 u nightly with stable A1c's around 8.5%. She requests prior authorization to continue taking Lantus. Patient is scheduled for follow-up visit on 4/14/25 to further discuss.

## 2025-04-14 ENCOUNTER — OFFICE VISIT (OUTPATIENT)
Dept: INTERNAL MEDICINE CLINIC | Age: 55
End: 2025-04-14
Payer: COMMERCIAL

## 2025-04-14 VITALS
TEMPERATURE: 97.3 F | HEART RATE: 90 BPM | SYSTOLIC BLOOD PRESSURE: 117 MMHG | HEIGHT: 65 IN | BODY MASS INDEX: 44.08 KG/M2 | DIASTOLIC BLOOD PRESSURE: 80 MMHG | OXYGEN SATURATION: 95 % | RESPIRATION RATE: 18 BRPM | WEIGHT: 264.6 LBS

## 2025-04-14 DIAGNOSIS — R31.9 URINARY TRACT INFECTION WITH HEMATURIA, SITE UNSPECIFIED: ICD-10-CM

## 2025-04-14 DIAGNOSIS — R35.0 MICTURITION FREQUENCY: Primary | ICD-10-CM

## 2025-04-14 DIAGNOSIS — Z79.4 TYPE 2 DIABETES MELLITUS WITHOUT COMPLICATION, WITH LONG-TERM CURRENT USE OF INSULIN: ICD-10-CM

## 2025-04-14 DIAGNOSIS — E11.9 TYPE 2 DIABETES MELLITUS WITHOUT COMPLICATION, WITH LONG-TERM CURRENT USE OF INSULIN: ICD-10-CM

## 2025-04-14 DIAGNOSIS — R35.0 MICTURITION FREQUENCY: ICD-10-CM

## 2025-04-14 DIAGNOSIS — M62.838 MUSCLE SPASM: ICD-10-CM

## 2025-04-14 DIAGNOSIS — N39.0 URINARY TRACT INFECTION WITH HEMATURIA, SITE UNSPECIFIED: ICD-10-CM

## 2025-04-14 LAB
BACTERIA URNS QL MICRO: ABNORMAL /HPF
BILIRUBIN, URINE: NEGATIVE MG/DL
BLOOD, URINE: ABNORMAL
CLARITY, UA: ABNORMAL
COLOR, UA: ABNORMAL
EPI CELLS #/AREA URNS AUTO: 1 /HPF (ref 0–5)
GLUCOSE URINE: NEGATIVE MG/DL
HBA1C MFR BLD: 10.8 %
HYALINE CASTS #/AREA URNS AUTO: 0 /LPF (ref 0–8)
KETONES, URINE: NEGATIVE MG/DL
LEUKOCYTE ESTERASE, URINE: ABNORMAL
MICROSCOPIC EXAMINATION: YES
NITRITE, URINE: NEGATIVE
PH, URINE: 5.5 (ref 5–8)
PROTEIN UA: ABNORMAL MG/DL
RBC CLUMPS #/AREA URNS AUTO: 2 /HPF (ref 0–4)
SPECIFIC GRAVITY UA: 1.01 (ref 1–1.03)
URN SPEC COLLECT METH UR: ABNORMAL
UROBILINOGEN, URINE: 0.2 E.U./DL
WBC #/AREA URNS AUTO: 121 /HPF (ref 0–5)

## 2025-04-14 PROCEDURE — 83036 HEMOGLOBIN GLYCOSYLATED A1C: CPT

## 2025-04-14 PROCEDURE — 81003 URINALYSIS AUTO W/O SCOPE: CPT

## 2025-04-14 PROCEDURE — 99213 OFFICE O/P EST LOW 20 MIN: CPT

## 2025-04-14 RX ORDER — ONDANSETRON 4 MG/1
4 TABLET, FILM COATED ORAL EVERY 8 HOURS PRN
Qty: 12 TABLET | Refills: 0 | Status: SHIPPED | OUTPATIENT
Start: 2025-04-14

## 2025-04-14 RX ORDER — DICYCLOMINE HYDROCHLORIDE 10 MG/1
10 CAPSULE ORAL 2 TIMES DAILY PRN
Qty: 120 CAPSULE | Refills: 0 | Status: SHIPPED | OUTPATIENT
Start: 2025-04-14

## 2025-04-14 RX ORDER — BLOOD SUGAR DIAGNOSTIC
1 STRIP MISCELLANEOUS 2 TIMES DAILY
Qty: 100 STRIP | Refills: 1 | Status: SHIPPED | OUTPATIENT
Start: 2025-04-14

## 2025-04-14 RX ORDER — CALCIUM CITRATE/VITAMIN D3 200MG-6.25
1 TABLET ORAL DAILY
Qty: 100 EACH | Refills: 3 | Status: SHIPPED | OUTPATIENT
Start: 2025-04-14

## 2025-04-14 RX ORDER — CIPROFLOXACIN 500 MG/1
500 TABLET, FILM COATED ORAL 2 TIMES DAILY
Qty: 14 TABLET | Refills: 0 | Status: SHIPPED | OUTPATIENT
Start: 2025-04-14 | End: 2025-04-21

## 2025-04-14 RX ORDER — INSULIN GLARGINE 100 [IU]/ML
25 INJECTION, SOLUTION SUBCUTANEOUS DAILY
Qty: 22.5 ML | Refills: 0 | Status: SHIPPED | OUTPATIENT
Start: 2025-04-14

## 2025-04-14 RX ORDER — PEN NEEDLE, DIABETIC 31 G X1/4"
1 NEEDLE, DISPOSABLE MISCELLANEOUS DAILY
Qty: 100 EACH | Refills: 3 | Status: SHIPPED | OUTPATIENT
Start: 2025-04-14

## 2025-04-14 RX ORDER — AVOBENZONE, HOMOSALATE, OCTISALATE, OCTOCRYLENE 30; 40; 45; 26 MG/ML; MG/ML; MG/ML; MG/ML
1 CREAM TOPICAL 2 TIMES DAILY
Qty: 300 EACH | Refills: 1 | Status: SHIPPED | OUTPATIENT
Start: 2025-04-14

## 2025-04-14 RX ORDER — PHENAZOPYRIDINE HYDROCHLORIDE 100 MG/1
100 TABLET, FILM COATED ORAL 3 TIMES DAILY PRN
Qty: 30 TABLET | Refills: 0 | Status: SHIPPED | OUTPATIENT
Start: 2025-04-14 | End: 2026-04-14

## 2025-04-14 SDOH — ECONOMIC STABILITY: FOOD INSECURITY: WITHIN THE PAST 12 MONTHS, THE FOOD YOU BOUGHT JUST DIDN'T LAST AND YOU DIDN'T HAVE MONEY TO GET MORE.: PATIENT DECLINED

## 2025-04-14 SDOH — ECONOMIC STABILITY: FOOD INSECURITY: WITHIN THE PAST 12 MONTHS, YOU WORRIED THAT YOUR FOOD WOULD RUN OUT BEFORE YOU GOT MONEY TO BUY MORE.: PATIENT DECLINED

## 2025-04-14 ASSESSMENT — PATIENT HEALTH QUESTIONNAIRE - PHQ9
SUM OF ALL RESPONSES TO PHQ QUESTIONS 1-9: 0
2. FEELING DOWN, DEPRESSED OR HOPELESS: NOT AT ALL
SUM OF ALL RESPONSES TO PHQ QUESTIONS 1-9: 0
1. LITTLE INTEREST OR PLEASURE IN DOING THINGS: NOT AT ALL
SUM OF ALL RESPONSES TO PHQ QUESTIONS 1-9: 0
SUM OF ALL RESPONSES TO PHQ QUESTIONS 1-9: 0

## 2025-04-14 ASSESSMENT — ENCOUNTER SYMPTOMS
ALLERGIC/IMMUNOLOGIC NEGATIVE: 1
EYES NEGATIVE: 1
GASTROINTESTINAL NEGATIVE: 1
RESPIRATORY NEGATIVE: 1

## 2025-04-14 NOTE — ASSESSMENT & PLAN NOTE
Ongoing issue for the past few months. States that it had started since her sugars have been uncontrolled. Patient needs better blood sugar control. Discussed better adherence to medications stated below.

## 2025-04-14 NOTE — PATIENT INSTRUCTIONS
Thank you for visiting the [facility] outpatient clinic!  Please get your urine checked prior to taking your antibiotics.  I have prescribed ciprofloxacin 500 mg.  Please take 2 pills twice daily for 7 days.  Please start taking trulicity 0.75 mg weekly. Please do not hesitate to call here at the clinic if you have any questions or concerns.  Please return to the clinic in about a month for your routine follow up visit.

## 2025-04-14 NOTE — ASSESSMENT & PLAN NOTE
Ongoing symptoms of dysuria which has unchanged from before.  She believes she has a urinary tract infection and would like an antibiotic.  She does also describe left flank pain.  She denies any symptoms such as fevers or chills.  She states that her symptoms of dysuria started since her sugars have been uncontrolled over the past few months.  Clinic urine sample showed leukocyte esterase positive.  -I instructed her to get a urine sample prior to starting her ciprofloxacin

## 2025-04-14 NOTE — PROGRESS NOTES
The [facility] Outpatient Internal Medicine Clinic    Elizabeth Wade is a 54 y.o. female, here for evaluation of the following concerns:    HPI  Ms. Wade was last seen here about 6 months ago.  She had an A1c of 8.8 at the time and had been taking Lantus 20 units.  She was prescribed Trulicity 0.75 mg however she states she had not taken it due to concerns of side effects.  She also states that she has ongoing issues with end-stream dysuria which has unchanged for many months now. She attributes it to her uncontrolled diabetes.    Review of Systems   Constitutional: Negative.    HENT: Negative.     Eyes: Negative.    Respiratory: Negative.     Cardiovascular: Negative.    Gastrointestinal: Negative.    Endocrine: Negative.    Genitourinary:  Positive for dysuria, flank pain and urgency.   Skin: Negative.    Allergic/Immunologic: Negative.    Neurological: Negative.    Hematological: Negative.    Psychiatric/Behavioral: Negative.         MEDICATIONS:  Prior to Visit Medications    Medication Sig Taking? Authorizing Provider   ciprofloxacin (CIPRO) 500 MG tablet Take 1 tablet by mouth 2 times daily for 7 days Yes Britni Peterson MD   dulaglutide (TRULICITY) 0.75 MG/0.5ML SOAJ SC injection Inject 0.5 mLs into the skin every 7 days Yes Britni Peterson MD   insulin glargine (BASAGLAR KWIKPEN) 100 UNIT/ML injection pen Inject 25 Units into the skin daily Yes Britni Peterson MD   phenazopyridine (PYRIDIUM) 100 MG tablet Take 1 tablet by mouth 3 times daily as needed for Pain Yes Britni Peterson MD   blood glucose test strips (TRUE METRIX BLOOD GLUCOSE TEST) strip 1 each by In Vitro route daily As needed. Yes Britni Peterson MD   Insulin Pen Needle ([employer] PEN NEEDLES) 31G X 6 MM MISC 1 each by Does not apply route daily Yes Britni Peterson MD   blood glucose test strips (ONETOUCH ULTRA) strip Apply 1 each topically 2 times daily As needed. Yes Britni Peterson MD   Lancets MISC 1 each by Does not apply route 2 times

## 2025-04-14 NOTE — ASSESSMENT & PLAN NOTE
A1c worsened today from 8.8-10.8.  Patient had not been adherent to her Trulicity due to concerns of adverse effect.  She had increased her home from 20 units to 25 units.  -We had an extensive discussion about the side effects and the risks and benefits of adding an antidiabetic medication versus having uncontrolled blood sugars.  We had mutually agreed to begin Trulicity 0.75 mg and to monitor for symptoms.  Instructed her to call here in the office if she has any questions or concerns.

## 2025-04-24 DIAGNOSIS — E11.9 TYPE 2 DIABETES MELLITUS WITHOUT COMPLICATION, WITH LONG-TERM CURRENT USE OF INSULIN (HCC): ICD-10-CM

## 2025-04-24 DIAGNOSIS — Z79.4 TYPE 2 DIABETES MELLITUS WITHOUT COMPLICATION, WITH LONG-TERM CURRENT USE OF INSULIN (HCC): ICD-10-CM

## 2025-04-24 RX ORDER — BLOOD SUGAR DIAGNOSTIC
1 STRIP MISCELLANEOUS 2 TIMES DAILY
Qty: 100 STRIP | Refills: 1 | Status: SHIPPED | OUTPATIENT
Start: 2025-04-24 | End: 2025-04-24 | Stop reason: SDUPTHER

## 2025-04-24 RX ORDER — BLOOD SUGAR DIAGNOSTIC
1 STRIP MISCELLANEOUS 2 TIMES DAILY
Qty: 100 STRIP | Refills: 1 | Status: SHIPPED | OUTPATIENT
Start: 2025-04-24

## 2025-07-09 DIAGNOSIS — E11.9 TYPE 2 DIABETES MELLITUS WITHOUT COMPLICATION, WITH LONG-TERM CURRENT USE OF INSULIN (HCC): ICD-10-CM

## 2025-07-09 DIAGNOSIS — Z79.4 TYPE 2 DIABETES MELLITUS WITHOUT COMPLICATION, WITH LONG-TERM CURRENT USE OF INSULIN (HCC): ICD-10-CM

## 2025-07-09 NOTE — TELEPHONE ENCOUNTER
Requested Prescriptions     Pending Prescriptions Disp Refills    BASAGLAR KWIKPEN 100 UNIT/ML injection pen [Pharmacy Med Name: BASAGLAR 100 U/ML KWIKPEN INJ 3ML] 21 mL      Sig: ADMINISTER 25 UNITS UNDER THE SKIN DAILY       Last Clinic Visit:  4/14/2025     Next Clinic Appointment:  Visit date not found

## 2025-07-10 RX ORDER — INSULIN GLARGINE 100 [IU]/ML
INJECTION, SOLUTION SUBCUTANEOUS
Qty: 10 ML | Refills: 3 | Status: SHIPPED | OUTPATIENT
Start: 2025-07-10

## (undated) DEVICE — GOWN,SIRUS,POLYRNF,SETINSLV,L,20/CS: Brand: MEDLINE

## (undated) DEVICE — COVER LT HNDL BLU PLAS

## (undated) DEVICE — DRAPE,LAP,CHOLE,W/TROUGHS,STERILE: Brand: MEDLINE

## (undated) DEVICE — SUTURE VCRL SZ 0 L18IN ABSRB VLT L36MM CT-1 1/2 CIR J740D

## (undated) DEVICE — SUTURE PERMA-HAND SZ 2-0 L30IN NONABSORBABLE BLK L26MM SH K833H

## (undated) DEVICE — GLOVE SURG SZ 65 THK91MIL LTX FREE SYN POLYISOPRENE

## (undated) DEVICE — BAG DRNGE 2000ML UROLOGY ANTI REFLX CHMBR SAMP PRT

## (undated) DEVICE — SUTURE VCRL SZ 4-0 L18IN ABSRB UD L19MM PS-2 3/8 CIR PRIM J496H

## (undated) DEVICE — GARMENT,MEDLINE,DVT,INT,CALF,MED, GEN2: Brand: MEDLINE

## (undated) DEVICE — ELECTRODE BLDE L6.5IN CAUT EXT DISP

## (undated) DEVICE — SYRINGE IRRIG 60ML SFT PLIABLE BLB EZ TO GRP 1 HND USE W/

## (undated) DEVICE — MEDIA CONTRAST INJ OPTIRAY 300 50 ML

## (undated) DEVICE — PAD MATERNITY CURITY ADH STRIP DISP

## (undated) DEVICE — ELECTRODE PT RET AD L9FT HI MOIST COND ADH HYDRGEL CORDED

## (undated) DEVICE — 3M™ WARMING BLANKET, UPPER BODY, 10 PER CASE, 42268: Brand: BAIR HUGGER™

## (undated) DEVICE — SYRINGE, LUER LOCK, 10ML: Brand: MEDLINE

## (undated) DEVICE — TRAY PREP DRY W/ PREM GLV 2 APPL 6 SPNG 2 UNDPD 1 OVERWRAP

## (undated) DEVICE — SOLUTION ST WATER 1500ML W/H

## (undated) DEVICE — RADIFOCUS GLIDEWIRE: Brand: GLIDEWIRE

## (undated) DEVICE — Z INACTIVE USE 2641838 CLIP INT L ORNG TI TRNSVRS GRV CHEVRON SHP W/ PRECIS TIP TO

## (undated) DEVICE — PRE OP PACK: Brand: MEDLINE INDUSTRIES, INC.

## (undated) DEVICE — OPEN-END FLEXI-TIP URETERAL CATHETER: Brand: FLEXI-TIP

## (undated) DEVICE — SURGICAL SET UP - SURE SET: Brand: MEDLINE INDUSTRIES, INC.

## (undated) DEVICE — SUTURE ABSORBABLE BRAIDED 0 CT-1 8X27 IN UD VICRYL JJ41G

## (undated) DEVICE — SUTURE PDS II SZ 0 L60IN ABSRB VLT L48MM CTX 1/2 CIR Z990G

## (undated) DEVICE — GLOVE SURG SZ 65 L12IN FNGR THK79MIL GRN LTX FREE

## (undated) DEVICE — GLOVE SURG SZ 75 L12IN FNGR THK94MIL WHT POLYMER LTX BEAD

## (undated) DEVICE — PACK LAP IV REINF TBL CVR ADH UTIL UNDERBUTTOCK DRP W CUF

## (undated) DEVICE — TURNOVER KIT RM INF CTRL TECH

## (undated) DEVICE — SUTURE VCRL SZ 2-0 L12X18IN ABSRB UD POLYGLACTIN 910 BRAID J911T

## (undated) DEVICE — COVER,MAYO STAND,XL,STERILE: Brand: MEDLINE

## (undated) DEVICE — BINDER ABD UNIV H9IN WAIST 30-45IN E SFT COT PREM 3 PNL

## (undated) DEVICE — SPONGE,LAP,18"X18",DLX,XR,ST,5/PK,40/PK: Brand: MEDLINE

## (undated) DEVICE — FORCEPS BX L240CM DIA2.4MM L NDL RAD JAW 4 133340

## (undated) DEVICE — TUBING IRRIG L77IN DIA0.241IN L BOR FOR CYSTO W/ NVENT

## (undated) DEVICE — GAUZE,SPONGE,4"X4",16PLY,XRAY,STRL,LF: Brand: MEDLINE

## (undated) DEVICE — INTENDED FOR TISSUE SEPARATION, AND OTHER PROCEDURES THAT REQUIRE A SHARP SURGICAL BLADE TO PUNCTURE OR CUT.: Brand: BARD-PARKER ® CARBON RIB-BACK BLADES

## (undated) DEVICE — SEALER ENDOSCP NANO COAT OPN DIV CRV L JAW LIGASURE IMPACT

## (undated) DEVICE — GOWN,SIRUS,POLYRNF,BRTHSLV,XL,30/CS: Brand: MEDLINE

## (undated) DEVICE — CHLORAPREP 26ML ORANGE

## (undated) DEVICE — SURE SET-DOUBLE BASIN-LF: Brand: MEDLINE INDUSTRIES, INC.

## (undated) DEVICE — TRAY CATHETER 16FR F INCLUDE BARDX IC COMPLT CARE DRNGE BG

## (undated) DEVICE — Z INACTIVE USE 2635503 SOLUTION IRRIG 3000ML ST H2O USP UROMATIC PLAS CONT

## (undated) DEVICE — PENCIL ES L3M ROCK SWCH S STL HEX LOK BLDE ELECTRD HOLSTER

## (undated) DEVICE — CATHETER F BLLN 5CC 16FR 2 W HYDRGEL COAT LESS TRAUM LUB

## (undated) DEVICE — PREP TRAY 10X5X2: Brand: MEDLINE INDUSTRIES, INC.

## (undated) DEVICE — SINGLE-USE POLYPECTOMY SNARE: Brand: CAPTIVATOR

## (undated) DEVICE — CATHETER URET 5FR L70CM TIP 8FR OPN END CONE TIP INJ HUB

## (undated) DEVICE — PACK,CYSTOSCOPY,PK VIII: Brand: MEDLINE

## (undated) DEVICE — MEDI-VAC NON-CONDUCTIVE SUCTION TUBING: Brand: CARDINAL HEALTH

## (undated) DEVICE — SUTURE VCRL SZ 2-0 L27IN ABSRB UD L26MM CT-2 1/2 CIR J269H